# Patient Record
Sex: FEMALE | Race: WHITE | Employment: UNEMPLOYED | URBAN - METROPOLITAN AREA
[De-identification: names, ages, dates, MRNs, and addresses within clinical notes are randomized per-mention and may not be internally consistent; named-entity substitution may affect disease eponyms.]

---

## 2017-01-12 ENCOUNTER — NURSE ONLY (OUTPATIENT)
Dept: FAMILY MEDICINE CLINIC | Age: 57
End: 2017-01-12

## 2017-01-12 DIAGNOSIS — Z12.9 CANCER SCREENING: ICD-10-CM

## 2017-01-12 LAB
CONTROL: NORMAL
HEMOCCULT STL QL: NORMAL

## 2017-01-12 PROCEDURE — 82274 ASSAY TEST FOR BLOOD FECAL: CPT | Performed by: FAMILY MEDICINE

## 2017-01-13 DIAGNOSIS — N28.89 RIGHT RENAL MASS: ICD-10-CM

## 2017-01-13 RX ORDER — OXYCODONE HYDROCHLORIDE 15 MG/1
15 TABLET ORAL EVERY 6 HOURS PRN
Qty: 50 TABLET | Refills: 0 | Status: SHIPPED | OUTPATIENT
Start: 2017-01-13 | End: 2017-01-24 | Stop reason: SDUPTHER

## 2017-01-24 DIAGNOSIS — N28.89 RIGHT RENAL MASS: ICD-10-CM

## 2017-01-24 RX ORDER — OXYCODONE HYDROCHLORIDE 15 MG/1
15 TABLET ORAL EVERY 6 HOURS PRN
Qty: 50 TABLET | Refills: 0 | Status: SHIPPED | OUTPATIENT
Start: 2017-01-24 | End: 2017-02-07 | Stop reason: SDUPTHER

## 2017-01-30 ENCOUNTER — OFFICE VISIT (OUTPATIENT)
Dept: FAMILY MEDICINE CLINIC | Age: 57
End: 2017-01-30

## 2017-01-30 VITALS
WEIGHT: 293 LBS | BODY MASS INDEX: 53.92 KG/M2 | SYSTOLIC BLOOD PRESSURE: 140 MMHG | HEIGHT: 62 IN | DIASTOLIC BLOOD PRESSURE: 78 MMHG | HEART RATE: 70 BPM | OXYGEN SATURATION: 97 %

## 2017-01-30 DIAGNOSIS — M54.16 RIGHT LUMBAR RADICULOPATHY: ICD-10-CM

## 2017-01-30 DIAGNOSIS — M51.26 LUMBAR HERNIATED DISC: ICD-10-CM

## 2017-01-30 DIAGNOSIS — G89.4 CHRONIC PAIN SYNDROME: Primary | ICD-10-CM

## 2017-01-30 DIAGNOSIS — E11.65 UNCONTROLLED TYPE 2 DIABETES MELLITUS WITH HYPERGLYCEMIA, WITHOUT LONG-TERM CURRENT USE OF INSULIN (HCC): ICD-10-CM

## 2017-01-30 PROCEDURE — 36415 COLL VENOUS BLD VENIPUNCTURE: CPT | Performed by: FAMILY MEDICINE

## 2017-01-30 PROCEDURE — 99213 OFFICE O/P EST LOW 20 MIN: CPT | Performed by: FAMILY MEDICINE

## 2017-01-30 ASSESSMENT — ENCOUNTER SYMPTOMS
BACK PAIN: 1
RESPIRATORY NEGATIVE: 1

## 2017-01-31 LAB
ANION GAP SERPL CALCULATED.3IONS-SCNC: 14 MMOL/L (ref 3–16)
BUN BLDV-MCNC: 28 MG/DL (ref 7–20)
CALCIUM SERPL-MCNC: 9.3 MG/DL (ref 8.3–10.6)
CHLORIDE BLD-SCNC: 95 MMOL/L (ref 99–110)
CO2: 30 MMOL/L (ref 21–32)
CREAT SERPL-MCNC: 1.6 MG/DL (ref 0.6–1.1)
ESTIMATED AVERAGE GLUCOSE: 205.9 MG/DL
GFR AFRICAN AMERICAN: 40
GFR NON-AFRICAN AMERICAN: 33
GLUCOSE BLD-MCNC: 286 MG/DL (ref 70–99)
HBA1C MFR BLD: 8.8 %
POTASSIUM SERPL-SCNC: 3.8 MMOL/L (ref 3.5–5.1)
SODIUM BLD-SCNC: 139 MMOL/L (ref 136–145)

## 2017-01-31 RX ORDER — GLIPIZIDE 5 MG/1
TABLET ORAL
Qty: 90 TABLET | Refills: 0 | Status: SHIPPED | OUTPATIENT
Start: 2017-01-31 | End: 2017-05-05 | Stop reason: SDUPTHER

## 2017-02-07 DIAGNOSIS — N28.89 RIGHT RENAL MASS: ICD-10-CM

## 2017-02-07 RX ORDER — OXYCODONE HYDROCHLORIDE 15 MG/1
15 TABLET ORAL EVERY 6 HOURS PRN
Qty: 50 TABLET | Refills: 0 | Status: SHIPPED | OUTPATIENT
Start: 2017-02-07 | End: 2017-02-20 | Stop reason: SDUPTHER

## 2017-02-20 DIAGNOSIS — N28.89 RIGHT RENAL MASS: ICD-10-CM

## 2017-02-20 RX ORDER — OXYCODONE HYDROCHLORIDE 15 MG/1
15 TABLET ORAL EVERY 6 HOURS PRN
Qty: 100 TABLET | Refills: 0 | Status: SHIPPED | OUTPATIENT
Start: 2017-02-20 | End: 2017-03-16 | Stop reason: SDUPTHER

## 2017-03-16 DIAGNOSIS — N28.89 RIGHT RENAL MASS: ICD-10-CM

## 2017-03-17 RX ORDER — OXYCODONE HYDROCHLORIDE 15 MG/1
15 TABLET ORAL EVERY 6 HOURS PRN
Qty: 100 TABLET | Refills: 0 | Status: SHIPPED | OUTPATIENT
Start: 2017-03-17 | End: 2017-04-07 | Stop reason: SDUPTHER

## 2017-04-07 DIAGNOSIS — N28.89 RIGHT RENAL MASS: ICD-10-CM

## 2017-04-07 RX ORDER — OXYCODONE HYDROCHLORIDE 15 MG/1
15 TABLET ORAL EVERY 6 HOURS PRN
Qty: 100 TABLET | Refills: 0 | Status: SHIPPED | OUTPATIENT
Start: 2017-04-07 | End: 2017-05-01 | Stop reason: SDUPTHER

## 2017-05-01 DIAGNOSIS — N28.89 RIGHT RENAL MASS: ICD-10-CM

## 2017-05-01 RX ORDER — OXYCODONE HYDROCHLORIDE 15 MG/1
15 TABLET ORAL EVERY 6 HOURS PRN
Qty: 100 TABLET | Refills: 0 | Status: SHIPPED | OUTPATIENT
Start: 2017-05-01 | End: 2017-05-23 | Stop reason: SDUPTHER

## 2017-05-05 ENCOUNTER — OFFICE VISIT (OUTPATIENT)
Dept: FAMILY MEDICINE CLINIC | Age: 57
End: 2017-05-05

## 2017-05-05 VITALS
OXYGEN SATURATION: 98 % | HEIGHT: 62 IN | BODY MASS INDEX: 53.92 KG/M2 | SYSTOLIC BLOOD PRESSURE: 135 MMHG | WEIGHT: 293 LBS | DIASTOLIC BLOOD PRESSURE: 82 MMHG | HEART RATE: 75 BPM

## 2017-05-05 DIAGNOSIS — I10 ESSENTIAL HYPERTENSION: ICD-10-CM

## 2017-05-05 DIAGNOSIS — N39.0 URINARY TRACT INFECTION WITHOUT HEMATURIA, SITE UNSPECIFIED: ICD-10-CM

## 2017-05-05 DIAGNOSIS — M51.26 LUMBAR HERNIATED DISC: ICD-10-CM

## 2017-05-05 DIAGNOSIS — E11.65 UNCONTROLLED TYPE 2 DIABETES MELLITUS WITH HYPERGLYCEMIA, WITHOUT LONG-TERM CURRENT USE OF INSULIN (HCC): ICD-10-CM

## 2017-05-05 DIAGNOSIS — G89.4 CHRONIC PAIN SYNDROME: Primary | ICD-10-CM

## 2017-05-05 LAB
BILIRUBIN, POC: NORMAL
BLOOD URINE, POC: NORMAL
CLARITY, POC: NORMAL
COLOR, POC: YELLOW
GLUCOSE URINE, POC: NORMAL
HBA1C MFR BLD: 7.9 %
KETONES, POC: NORMAL
LEUKOCYTE EST, POC: NORMAL
NITRITE, POC: NORMAL
PH, POC: 5.5
PROTEIN, POC: 30
SPECIFIC GRAVITY, POC: 1.02
UROBILINOGEN, POC: 0.2

## 2017-05-05 PROCEDURE — 83036 HEMOGLOBIN GLYCOSYLATED A1C: CPT | Performed by: FAMILY MEDICINE

## 2017-05-05 PROCEDURE — 81002 URINALYSIS NONAUTO W/O SCOPE: CPT | Performed by: FAMILY MEDICINE

## 2017-05-05 PROCEDURE — 99214 OFFICE O/P EST MOD 30 MIN: CPT | Performed by: FAMILY MEDICINE

## 2017-05-05 RX ORDER — NITROFURANTOIN 25; 75 MG/1; MG/1
100 CAPSULE ORAL 2 TIMES DAILY
Qty: 20 CAPSULE | Refills: 0 | Status: SHIPPED | OUTPATIENT
Start: 2017-05-05 | End: 2017-05-15

## 2017-05-05 RX ORDER — GLIPIZIDE 5 MG/1
TABLET ORAL
Qty: 135 TABLET | Refills: 1 | Status: SHIPPED | OUTPATIENT
Start: 2017-05-05 | End: 2017-12-27 | Stop reason: SDUPTHER

## 2017-05-05 ASSESSMENT — ENCOUNTER SYMPTOMS
BACK PAIN: 1
GASTROINTESTINAL NEGATIVE: 1
RESPIRATORY NEGATIVE: 1

## 2017-05-07 LAB — URINE CULTURE, ROUTINE: NORMAL

## 2017-05-10 LAB
6-ACETYLMORPHINE: NOT DETECTED
7-AMINOCLONAZEPAM: NOT DETECTED
ALPHA-OH-ALPRAZOLAM: NOT DETECTED
ALPRAZOLAM: NOT DETECTED
AMPHETAMINE: NOT DETECTED
BARBITURATES: NOT DETECTED
BENZOYLECGONINE: NOT DETECTED
BUPRENORPHINE: NOT DETECTED
CARISOPRODOL: NOT DETECTED
CLONAZEPAM: NOT DETECTED
CODEINE: NOT DETECTED
CREATININE URINE: 97.1 MG/DL (ref 20–400)
DIAZEPAM: NOT DETECTED
DRUGS EXPECTED: NORMAL
EER PAIN MGT DRUG PANEL, HIGH RES/EMIT U: NORMAL
ETHYL GLUCURONIDE: NOT DETECTED
FENTANYL: NOT DETECTED
HYDROCODONE: NOT DETECTED
HYDROMORPHONE: NOT DETECTED
LORAZEPAM: NOT DETECTED
MARIJUANA METABOLITE: NOT DETECTED
MDA: NOT DETECTED
MDEA: NOT DETECTED
MDMA URINE: NOT DETECTED
MEPERIDINE: NOT DETECTED
METHADONE: NOT DETECTED
METHAMPHETAMINE: NOT DETECTED
METHYLPHENIDATE: NOT DETECTED
MIDAZOLAM: NOT DETECTED
MORPHINE: NOT DETECTED
NORBUPRENORPHINE, FREE: NOT DETECTED
NORDIAZEPAM: NOT DETECTED
NORFENTANYL: NOT DETECTED
NORHYDROCODONE, URINE: NOT DETECTED
NOROXYCODONE: PRESENT
NOROXYMORPHONE, URINE: PRESENT
OXAZEPAM: NOT DETECTED
OXYCODONE: PRESENT
OXYMORPHONE: PRESENT
PAIN MANAGEMENT DRUG PANEL: NORMAL
PAIN MANAGEMENT DRUG PANEL: NORMAL
PCP: NOT DETECTED
PHENTERMINE: NOT DETECTED
PROPOXYPHENE: NOT DETECTED
TAPENTADOL, URINE: NOT DETECTED
TAPENTADOL-O-SULFATE, URINE: NOT DETECTED
TEMAZEPAM: NOT DETECTED
TRAMADOL: NOT DETECTED
ZOLPIDEM: NOT DETECTED

## 2017-05-23 DIAGNOSIS — N28.89 RIGHT RENAL MASS: ICD-10-CM

## 2017-05-25 RX ORDER — OXYCODONE HYDROCHLORIDE 15 MG/1
15 TABLET ORAL EVERY 6 HOURS PRN
Qty: 100 TABLET | Refills: 0 | Status: SHIPPED | OUTPATIENT
Start: 2017-05-25 | End: 2017-06-19 | Stop reason: SDUPTHER

## 2017-06-14 DIAGNOSIS — N28.89 RIGHT RENAL MASS: ICD-10-CM

## 2017-06-15 RX ORDER — OXYCODONE HYDROCHLORIDE 15 MG/1
15 TABLET ORAL EVERY 6 HOURS PRN
Qty: 100 TABLET | Refills: 0 | OUTPATIENT
Start: 2017-06-15

## 2017-06-19 DIAGNOSIS — N28.89 RIGHT RENAL MASS: ICD-10-CM

## 2017-06-19 RX ORDER — OXYCODONE HYDROCHLORIDE 15 MG/1
15 TABLET ORAL EVERY 6 HOURS PRN
Qty: 100 TABLET | Refills: 0 | Status: SHIPPED | OUTPATIENT
Start: 2017-06-19 | End: 2017-07-12 | Stop reason: SDUPTHER

## 2017-07-10 DIAGNOSIS — N28.89 RIGHT RENAL MASS: ICD-10-CM

## 2017-07-10 RX ORDER — OXYCODONE HYDROCHLORIDE 15 MG/1
15 TABLET ORAL EVERY 6 HOURS PRN
Qty: 100 TABLET | Refills: 0 | OUTPATIENT
Start: 2017-07-10

## 2017-07-12 ENCOUNTER — HOSPITAL ENCOUNTER (OUTPATIENT)
Dept: SURGERY | Age: 57
Discharge: HOME | End: 2017-07-12
Attending: OPHTHALMOLOGY | Admitting: OPHTHALMOLOGY

## 2017-07-12 VITALS — DIASTOLIC BLOOD PRESSURE: 101 MMHG | HEART RATE: 106 BPM | SYSTOLIC BLOOD PRESSURE: 137 MMHG

## 2017-07-12 DIAGNOSIS — N28.89 RIGHT RENAL MASS: ICD-10-CM

## 2017-07-12 RX ORDER — PREDNISOLONE ACETATE 10 MG/ML
1 SUSPENSION/ DROPS OPHTHALMIC
Status: COMPLETED | OUTPATIENT
Start: 2017-07-12 | End: 2017-07-12

## 2017-07-12 RX ORDER — PROPARACAINE HYDROCHLORIDE 5 MG/ML
1 SOLUTION/ DROPS OPHTHALMIC
Status: COMPLETED | OUTPATIENT
Start: 2017-07-12 | End: 2017-07-12

## 2017-07-12 RX ORDER — PILOCARPINE HYDROCHLORIDE 20 MG/ML
1 SOLUTION/ DROPS OPHTHALMIC ONCE
Status: COMPLETED | OUTPATIENT
Start: 2017-07-12 | End: 2017-07-12

## 2017-07-12 RX ADMIN — PILOCARPINE HYDROCHLORIDE 1 DROP: 20 SOLUTION/ DROPS OPHTHALMIC at 15:09

## 2017-07-12 RX ADMIN — PROPARACAINE HYDROCHLORIDE 1 DROP: 5 SOLUTION/ DROPS OPHTHALMIC at 15:49

## 2017-07-12 RX ADMIN — PREDNISOLONE ACETATE 1 DROP: 10 SUSPENSION/ DROPS OPHTHALMIC at 15:51

## 2017-07-13 RX ORDER — OXYCODONE HYDROCHLORIDE 15 MG/1
15 TABLET ORAL EVERY 6 HOURS PRN
Qty: 100 TABLET | Refills: 0 | Status: SHIPPED | OUTPATIENT
Start: 2017-07-13 | End: 2017-08-07 | Stop reason: SDUPTHER

## 2017-07-13 RX ORDER — OXYCODONE HYDROCHLORIDE 15 MG/1
15 TABLET ORAL EVERY 6 HOURS PRN
Qty: 100 TABLET | Refills: 0 | OUTPATIENT
Start: 2017-07-13

## 2017-07-19 ENCOUNTER — HOSPITAL ENCOUNTER (OUTPATIENT)
Dept: SURGERY | Age: 57
Discharge: HOME | End: 2017-07-19
Attending: OPHTHALMOLOGY | Admitting: OPHTHALMOLOGY

## 2017-07-19 VITALS — DIASTOLIC BLOOD PRESSURE: 82 MMHG | SYSTOLIC BLOOD PRESSURE: 138 MMHG | HEART RATE: 80 BPM

## 2017-07-19 RX ORDER — PILOCARPINE HYDROCHLORIDE 20 MG/ML
1 SOLUTION/ DROPS OPHTHALMIC
Status: COMPLETED | OUTPATIENT
Start: 2017-07-19 | End: 2017-07-19

## 2017-07-19 RX ORDER — PREDNISOLONE ACETATE 10 MG/ML
1 SUSPENSION/ DROPS OPHTHALMIC
Status: COMPLETED | OUTPATIENT
Start: 2017-07-19 | End: 2017-07-19

## 2017-07-19 RX ORDER — PROPARACAINE HYDROCHLORIDE 5 MG/ML
1 SOLUTION/ DROPS OPHTHALMIC
Status: COMPLETED | OUTPATIENT
Start: 2017-07-19 | End: 2017-07-19

## 2017-07-19 RX ADMIN — PILOCARPINE HYDROCHLORIDE 1 DROP: 20 SOLUTION/ DROPS OPHTHALMIC at 13:45

## 2017-07-19 RX ADMIN — PROPARACAINE HYDROCHLORIDE 1 DROP: 5 SOLUTION/ DROPS OPHTHALMIC at 14:24

## 2017-07-19 RX ADMIN — PREDNISOLONE ACETATE 1 DROP: 10 SUSPENSION/ DROPS OPHTHALMIC at 14:28

## 2017-08-01 RX ORDER — PREGABALIN 150 MG/1
150 CAPSULE ORAL DAILY
Qty: 90 CAPSULE | Refills: 1 | Status: SHIPPED | OUTPATIENT
Start: 2017-08-01 | End: 2017-12-08 | Stop reason: SDUPTHER

## 2017-08-03 DIAGNOSIS — N28.89 RIGHT RENAL MASS: ICD-10-CM

## 2017-08-03 RX ORDER — OXYCODONE HYDROCHLORIDE 15 MG/1
15 TABLET ORAL EVERY 6 HOURS PRN
Qty: 100 TABLET | Refills: 0 | OUTPATIENT
Start: 2017-08-03

## 2017-08-07 DIAGNOSIS — N28.89 RIGHT RENAL MASS: ICD-10-CM

## 2017-08-07 RX ORDER — OXYCODONE HYDROCHLORIDE 15 MG/1
15 TABLET ORAL EVERY 6 HOURS PRN
Qty: 100 TABLET | Refills: 0 | Status: SHIPPED | OUTPATIENT
Start: 2017-08-07 | End: 2017-08-31 | Stop reason: SDUPTHER

## 2017-08-31 DIAGNOSIS — N28.89 RIGHT RENAL MASS: ICD-10-CM

## 2017-09-01 RX ORDER — OXYCODONE HYDROCHLORIDE 15 MG/1
15 TABLET ORAL EVERY 6 HOURS PRN
Qty: 100 TABLET | Refills: 0 | Status: SHIPPED | OUTPATIENT
Start: 2017-09-01 | End: 2017-09-26 | Stop reason: SDUPTHER

## 2017-09-08 ENCOUNTER — OFFICE VISIT (OUTPATIENT)
Dept: FAMILY MEDICINE CLINIC | Age: 57
End: 2017-09-08

## 2017-09-08 VITALS
DIASTOLIC BLOOD PRESSURE: 84 MMHG | RESPIRATION RATE: 17 BRPM | TEMPERATURE: 98.2 F | WEIGHT: 291 LBS | OXYGEN SATURATION: 94 % | BODY MASS INDEX: 53.22 KG/M2 | SYSTOLIC BLOOD PRESSURE: 150 MMHG | HEART RATE: 90 BPM

## 2017-09-08 DIAGNOSIS — G89.29 RIGHT FLANK PAIN, CHRONIC: ICD-10-CM

## 2017-09-08 DIAGNOSIS — E11.65 UNCONTROLLED TYPE 2 DIABETES MELLITUS WITH HYPERGLYCEMIA, WITHOUT LONG-TERM CURRENT USE OF INSULIN (HCC): ICD-10-CM

## 2017-09-08 DIAGNOSIS — R10.9 RIGHT FLANK PAIN, CHRONIC: ICD-10-CM

## 2017-09-08 DIAGNOSIS — G89.4 CHRONIC PAIN SYNDROME: Primary | ICD-10-CM

## 2017-09-08 DIAGNOSIS — I10 ESSENTIAL HYPERTENSION: ICD-10-CM

## 2017-09-08 LAB — HBA1C MFR BLD: 8 %

## 2017-09-08 PROCEDURE — 99214 OFFICE O/P EST MOD 30 MIN: CPT | Performed by: FAMILY MEDICINE

## 2017-09-08 PROCEDURE — 83036 HEMOGLOBIN GLYCOSYLATED A1C: CPT | Performed by: FAMILY MEDICINE

## 2017-09-08 ASSESSMENT — ENCOUNTER SYMPTOMS
RESPIRATORY NEGATIVE: 1
GASTROINTESTINAL NEGATIVE: 1
BACK PAIN: 1

## 2017-09-20 ENCOUNTER — INITIAL CONSULT (OUTPATIENT)
Dept: SURGERY | Age: 57
End: 2017-09-20

## 2017-09-20 VITALS
BODY MASS INDEX: 53.92 KG/M2 | SYSTOLIC BLOOD PRESSURE: 134 MMHG | WEIGHT: 293 LBS | HEIGHT: 62 IN | DIASTOLIC BLOOD PRESSURE: 78 MMHG

## 2017-09-20 DIAGNOSIS — R19.00 FLANK MASS: Primary | ICD-10-CM

## 2017-09-20 PROCEDURE — 99242 OFF/OP CONSLTJ NEW/EST SF 20: CPT | Performed by: SURGERY

## 2017-09-26 DIAGNOSIS — N28.89 RIGHT RENAL MASS: ICD-10-CM

## 2017-09-26 RX ORDER — OXYCODONE HYDROCHLORIDE 15 MG/1
15 TABLET ORAL EVERY 6 HOURS PRN
Qty: 100 TABLET | Refills: 0 | Status: SHIPPED | OUTPATIENT
Start: 2017-09-26 | End: 2017-10-20 | Stop reason: SDUPTHER

## 2017-10-20 DIAGNOSIS — N28.89 RIGHT RENAL MASS: ICD-10-CM

## 2017-10-20 RX ORDER — OXYCODONE HYDROCHLORIDE 15 MG/1
15 TABLET ORAL EVERY 6 HOURS PRN
Qty: 100 TABLET | Refills: 0 | Status: SHIPPED | OUTPATIENT
Start: 2017-10-20 | End: 2017-11-13 | Stop reason: SDUPTHER

## 2017-10-20 NOTE — TELEPHONE ENCOUNTER
Last Seen: Visit date not found    Last Writen: 9/26/17    Last UDS: 5/15/17    OARRS Run On: 5/5/16    Med Agreement Signed On: NA    Next Appointment: Visit date not found    Requested Prescriptions     Pending Prescriptions Disp Refills    oxyCODONE (OXY-IR) 15 MG immediate release tablet 100 tablet 0     Sig: Take 1 tablet by mouth every 6 hours as needed for Pain .  Earliest Fill Date: 10/20/17

## 2017-11-13 DIAGNOSIS — N28.89 RIGHT RENAL MASS: ICD-10-CM

## 2017-11-13 RX ORDER — OXYCODONE HYDROCHLORIDE 15 MG/1
15 TABLET ORAL EVERY 6 HOURS PRN
Qty: 100 TABLET | Refills: 0 | Status: SHIPPED | OUTPATIENT
Start: 2017-11-13 | End: 2017-12-08 | Stop reason: SDUPTHER

## 2017-11-13 NOTE — TELEPHONE ENCOUNTER
From: Concha Oconnor  Sent: 11/12/2017 11:05 AM EST  Subject: Medication Renewal Request    Concha Oconnor would like a refill of the following medications:  oxyCODONE (OXY-IR) 15 MG immediate release tablet [Pramod Delgado DO]    Preferred pharmacy: [address] [phone] - F [phone]    Comment:

## 2017-12-07 DIAGNOSIS — N28.89 RIGHT RENAL MASS: ICD-10-CM

## 2017-12-07 RX ORDER — OXYCODONE HYDROCHLORIDE 15 MG/1
15 TABLET ORAL EVERY 6 HOURS PRN
Qty: 100 TABLET | Refills: 0 | Status: CANCELLED | OUTPATIENT
Start: 2017-12-07

## 2017-12-07 NOTE — TELEPHONE ENCOUNTER
From: Daysi Beach  Sent: 12/7/2017 6:27 AM EST  Subject: Medication Renewal Request    Daysi Beach would like a refill of the following medications:  oxyCODONE (OXY-IR) 15 MG immediate release tablet Ayanna Gunter DO]    Preferred pharmacy: [address] [phone] - F [phone]    Comment:

## 2017-12-07 NOTE — TELEPHONE ENCOUNTER
Last Seen: 9/8/2017    Last Writen: 11-13-17    Last UDS: 5-5-17    OARRS Run On: 7-10-17    Med Agreement Signed On: 7-16-12    Next Appointment: 12/8/2017    Requested Prescriptions     Pending Prescriptions Disp Refills    oxyCODONE (OXY-IR) 15 MG immediate release tablet 100 tablet 0     Sig: Take 1 tablet by mouth every 6 hours as needed for Pain .  Earliest Fill Date: 12/7/17

## 2017-12-08 ENCOUNTER — OFFICE VISIT (OUTPATIENT)
Dept: FAMILY MEDICINE CLINIC | Age: 57
End: 2017-12-08

## 2017-12-08 VITALS
WEIGHT: 293 LBS | HEART RATE: 79 BPM | DIASTOLIC BLOOD PRESSURE: 86 MMHG | HEIGHT: 61 IN | OXYGEN SATURATION: 97 % | SYSTOLIC BLOOD PRESSURE: 152 MMHG | BODY MASS INDEX: 55.32 KG/M2

## 2017-12-08 DIAGNOSIS — N28.89 RIGHT RENAL MASS: ICD-10-CM

## 2017-12-08 DIAGNOSIS — M54.16 RIGHT LUMBAR RADICULOPATHY: ICD-10-CM

## 2017-12-08 DIAGNOSIS — G89.4 CHRONIC PAIN SYNDROME: Primary | ICD-10-CM

## 2017-12-08 LAB
ANION GAP SERPL CALCULATED.3IONS-SCNC: 13 MMOL/L (ref 3–16)
BUN BLDV-MCNC: 21 MG/DL (ref 7–20)
CALCIUM SERPL-MCNC: 9.3 MG/DL (ref 8.3–10.6)
CHLORIDE BLD-SCNC: 102 MMOL/L (ref 99–110)
CO2: 26 MMOL/L (ref 21–32)
CREAT SERPL-MCNC: 1.4 MG/DL (ref 0.6–1.1)
GFR AFRICAN AMERICAN: 47
GFR NON-AFRICAN AMERICAN: 39
GLUCOSE BLD-MCNC: 193 MG/DL (ref 70–99)
HBA1C MFR BLD: 8.8 %
POTASSIUM SERPL-SCNC: 4.7 MMOL/L (ref 3.5–5.1)
SODIUM BLD-SCNC: 141 MMOL/L (ref 136–145)

## 2017-12-08 PROCEDURE — 3045F PR MOST RECENT HEMOGLOBIN A1C LEVEL 7.0-9.0%: CPT | Performed by: FAMILY MEDICINE

## 2017-12-08 PROCEDURE — 1036F TOBACCO NON-USER: CPT | Performed by: FAMILY MEDICINE

## 2017-12-08 PROCEDURE — G8484 FLU IMMUNIZE NO ADMIN: HCPCS | Performed by: FAMILY MEDICINE

## 2017-12-08 PROCEDURE — 99214 OFFICE O/P EST MOD 30 MIN: CPT | Performed by: FAMILY MEDICINE

## 2017-12-08 PROCEDURE — 3017F COLORECTAL CA SCREEN DOC REV: CPT | Performed by: FAMILY MEDICINE

## 2017-12-08 PROCEDURE — 3014F SCREEN MAMMO DOC REV: CPT | Performed by: FAMILY MEDICINE

## 2017-12-08 PROCEDURE — G8427 DOCREV CUR MEDS BY ELIG CLIN: HCPCS | Performed by: FAMILY MEDICINE

## 2017-12-08 PROCEDURE — G8417 CALC BMI ABV UP PARAM F/U: HCPCS | Performed by: FAMILY MEDICINE

## 2017-12-08 PROCEDURE — 83036 HEMOGLOBIN GLYCOSYLATED A1C: CPT | Performed by: FAMILY MEDICINE

## 2017-12-08 RX ORDER — PREGABALIN 150 MG/1
150 CAPSULE ORAL 2 TIMES DAILY
Qty: 60 CAPSULE | Refills: 1 | Status: SHIPPED | OUTPATIENT
Start: 2017-12-08 | End: 2018-02-16 | Stop reason: SDUPTHER

## 2017-12-08 RX ORDER — ONDANSETRON HYDROCHLORIDE 8 MG/1
8 TABLET, FILM COATED ORAL EVERY 8 HOURS PRN
Qty: 30 TABLET | Refills: 0 | Status: SHIPPED | OUTPATIENT
Start: 2017-12-08 | End: 2019-10-14 | Stop reason: SDUPTHER

## 2017-12-08 RX ORDER — CHLORTHALIDONE 25 MG/1
25 TABLET ORAL DAILY
Qty: 90 TABLET | Refills: 1 | Status: SHIPPED | OUTPATIENT
Start: 2017-12-08 | End: 2018-06-02 | Stop reason: SDUPTHER

## 2017-12-08 RX ORDER — OXYCODONE HYDROCHLORIDE 15 MG/1
15 TABLET ORAL EVERY 6 HOURS PRN
Qty: 100 TABLET | Refills: 0 | Status: SHIPPED | OUTPATIENT
Start: 2017-12-08 | End: 2018-01-04 | Stop reason: SDUPTHER

## 2017-12-08 ASSESSMENT — ENCOUNTER SYMPTOMS
RESPIRATORY NEGATIVE: 1
GASTROINTESTINAL NEGATIVE: 1
BACK PAIN: 1

## 2017-12-08 ASSESSMENT — PATIENT HEALTH QUESTIONNAIRE - PHQ9
2. FEELING DOWN, DEPRESSED OR HOPELESS: 0
1. LITTLE INTEREST OR PLEASURE IN DOING THINGS: 0
SUM OF ALL RESPONSES TO PHQ QUESTIONS 1-9: 0
SUM OF ALL RESPONSES TO PHQ9 QUESTIONS 1 & 2: 0

## 2017-12-08 NOTE — PROGRESS NOTES
Subjective:      Patient ID: Sylvia Charles is a 61 y.o. female. In for check on Chronic pain(OK with med-on Lyrica)--DM(FBS's < 120)    Prior to Visit Medications :  Medication ondansetron (ZOFRAN) 8 MG tablet, Sig Take 1 tablet by mouth every 8 hours as needed for Nausea or Vomiting, Taking? Yes, Authorizing Provider Ash Garber, DO    Medication JANUVIA 50 MG tablet, Sig TAKE ONE TABLET BY MOUTH DAILY, Taking? Yes, Authorizing Provider Ash Garber, DO    Medication oxyCODONE (OXY-IR) 15 MG immediate release tablet, Sig Take 1 tablet by mouth every 6 hours as needed for Pain ., Taking? Yes, Authorizing Provider Ash Garber, DO    Medication pregabalin (LYRICA) 150 MG capsule, Sig Take 1 capsule by mouth daily, Taking? Yes, Authorizing Provider Ash Garber, DO    Medication glipiZIDE (GLUCOTROL) 5 MG tablet, Sig 1.5 daily AM, Taking? Yes, Authorizing Provider Ash Garber, DO    Medication glucose blood VI test strips (BL TEST STRIP PACK) strip, Sig 1 each by In Vitro route daily Glucose test strips for One touch Ultra glucometer up 6-7 times a day, Taking? Yes, Authorizing Provider Cora Graham CNP    Medication pravastatin (PRAVACHOL) 20 MG tablet, Sig Take 1 tablet by mouth daily, Taking? Yes, Authorizing Provider Jg Tate NP    Medication aspirin 81 MG tablet, Sig Take 81 mg by mouth daily, Taking? Yes, Authorizing Provider Historical Provider, MD    Medication glucose blood VI test strips (ASCENSIA AUTODISC VI;ONE TOUCH ULTRA TEST VI) strip, Sig 1 each by Does not apply route 2 times daily One touch ultra meter. .0. FSBS 2 times daily. , Taking?  Yes, Authorizing Provider Cora Graham CNP    Medication Insulin Pen Needle 32G X 6 MM MISC, Sig For insulin pen 3 times a day with meals, Taking? , Authorizing Provider Jg Tate NP    Medication potassium citrate (UROCIT-K) 10 MEQ (1080 MG) SR tablet, Sig Take 10 mEq by mouth 3 times daily (with meals), Taking? , Authorizing Provider Historical Provider, MD    Medication chlorthalidone (HYGROTON) 25 MG tablet, Sig Take 25 mg by mouth daily, Taking? , Authorizing Provider Historical Provider, MD      Past Medical History:  July, 2015: JEAN (acute kidney injury) [facility])      Comment: Nephrologist- Dr Deepak Cornejo  No date: Chronic back pain  No date: COPD (chronic obstructive pulmonary disease) (*  No date: Hyperlipidemia  2009: Necrotizing fasciitis ([facility] [address]      Comment: left groin  9/7/2011: Pain medication agreement signed      Comment: pt is not on contract anymore. does not take                pain meds as of february 2015  No date: Renal calculi      Comment: Rt staghorn--non obstr on Lt-as of 10/1  No date: Renal calculus or stone  No date: Sleep apnea  No date: Type II or unspecified type diabetes mellitus *  10/21/15: VRE (vancomycin-resistant Enterococci)      Comment: urine          Review of Systems   HENT: Negative. Respiratory: Negative. Cardiovascular: Negative. Gastrointestinal: Negative. Musculoskeletal: Positive for arthralgias and back pain. Neurological: Negative. Objective:   Physical Exam   Constitutional: She is oriented to person, place, and time. Eyes: Conjunctivae are normal.   Cardiovascular: Normal rate, regular rhythm and normal heart sounds. Pulmonary/Chest: Effort normal and breath sounds normal.   Abdominal: Soft. There is no tenderness. Musculoskeletal:   Tender,mild spasm,reduced rom L spine   Neurological: She is alert and oriented to person, place, and time. Assessment:      1. Chronic pain syndrome    2. Right lumbar radiculopathy    3. Uncontrolled type 2 diabetes mellitus without complication, without long-term current use of insulin ([facility] [address]            Plan:      Sam Palacios was seen today for chronic pain and diabetes.     Diagnoses and all orders for this visit:    Chronic pain syndrome  Try & reduce pain meds-I will increase Lyrica to twice a day  Right

## 2018-01-04 DIAGNOSIS — N28.89 RIGHT RENAL MASS: ICD-10-CM

## 2018-01-04 RX ORDER — OXYCODONE HYDROCHLORIDE 15 MG/1
15 TABLET ORAL EVERY 8 HOURS PRN
Qty: 90 TABLET | Refills: 0 | Status: SHIPPED | OUTPATIENT
Start: 2018-01-04 | End: 2018-02-05 | Stop reason: SDUPTHER

## 2018-01-04 NOTE — TELEPHONE ENCOUNTER
Last Seen: 12/8/2017    Last Writen: 12-8-17   # 100  0 RF    Last UDS: 5-5-17    OARRS Run On: 12-8-17    Med Agreement Signed On: 7-16-12    Next Appointment: 3/9/2018    Requested Prescriptions     Pending Prescriptions Disp Refills    oxyCODONE (OXY-IR) 15 MG immediate release tablet 100 tablet 0     Sig: Take 1 tablet by mouth every 6 hours as needed for Pain.  Earliest Fill Date: 1/4/18

## 2018-03-06 DIAGNOSIS — N28.89 RIGHT RENAL MASS: ICD-10-CM

## 2018-03-06 RX ORDER — OXYCODONE HYDROCHLORIDE 15 MG/1
15 TABLET ORAL EVERY 8 HOURS PRN
Qty: 90 TABLET | Refills: 0 | Status: SHIPPED | OUTPATIENT
Start: 2018-03-06 | End: 2018-04-05 | Stop reason: SDUPTHER

## 2018-03-06 NOTE — TELEPHONE ENCOUNTER
.  Last Seen: 2017    Last Ricco Angjoann18 #90 with 0 refill     Last UDS: 17    OARRS Run On: 17    Med Agreement Signed On: 12    Next Appointment: 3/9/2018    Requested Prescriptions     Pending Prescriptions Disp Refills    oxyCODONE (OXY-IR) 15 MG immediate release tablet 90 tablet 0     Sig: Take 1 tablet by mouth every 8 hours as needed for Pain for up to 30 days.  Earliest Fill Date: 3/6/18

## 2018-03-09 ENCOUNTER — OFFICE VISIT (OUTPATIENT)
Dept: FAMILY MEDICINE CLINIC | Age: 58
End: 2018-03-09

## 2018-03-09 VITALS
WEIGHT: 293 LBS | HEIGHT: 61 IN | BODY MASS INDEX: 55.32 KG/M2 | DIASTOLIC BLOOD PRESSURE: 78 MMHG | HEART RATE: 79 BPM | SYSTOLIC BLOOD PRESSURE: 140 MMHG | OXYGEN SATURATION: 98 %

## 2018-03-09 DIAGNOSIS — G89.4 CHRONIC PAIN SYNDROME: Primary | ICD-10-CM

## 2018-03-09 DIAGNOSIS — M54.16 RIGHT LUMBAR RADICULOPATHY: ICD-10-CM

## 2018-03-09 LAB
ANION GAP SERPL CALCULATED.3IONS-SCNC: 19 MMOL/L (ref 3–16)
BUN BLDV-MCNC: 37 MG/DL (ref 7–20)
CALCIUM SERPL-MCNC: 9.2 MG/DL (ref 8.3–10.6)
CHLORIDE BLD-SCNC: 101 MMOL/L (ref 99–110)
CO2: 25 MMOL/L (ref 21–32)
CREAT SERPL-MCNC: 1.5 MG/DL (ref 0.6–1.1)
GFR AFRICAN AMERICAN: 43
GFR NON-AFRICAN AMERICAN: 36
GLUCOSE BLD-MCNC: 214 MG/DL (ref 70–99)
HBA1C MFR BLD: 8.3 %
POTASSIUM SERPL-SCNC: 4.2 MMOL/L (ref 3.5–5.1)
SODIUM BLD-SCNC: 145 MMOL/L (ref 136–145)

## 2018-03-09 PROCEDURE — 83036 HEMOGLOBIN GLYCOSYLATED A1C: CPT | Performed by: FAMILY MEDICINE

## 2018-03-09 PROCEDURE — 99214 OFFICE O/P EST MOD 30 MIN: CPT | Performed by: FAMILY MEDICINE

## 2018-03-09 PROCEDURE — 3014F SCREEN MAMMO DOC REV: CPT | Performed by: FAMILY MEDICINE

## 2018-03-09 PROCEDURE — 3017F COLORECTAL CA SCREEN DOC REV: CPT | Performed by: FAMILY MEDICINE

## 2018-03-09 PROCEDURE — G8427 DOCREV CUR MEDS BY ELIG CLIN: HCPCS | Performed by: FAMILY MEDICINE

## 2018-03-09 PROCEDURE — G8484 FLU IMMUNIZE NO ADMIN: HCPCS | Performed by: FAMILY MEDICINE

## 2018-03-09 PROCEDURE — 1036F TOBACCO NON-USER: CPT | Performed by: FAMILY MEDICINE

## 2018-03-09 PROCEDURE — 3045F PR MOST RECENT HEMOGLOBIN A1C LEVEL 7.0-9.0%: CPT | Performed by: FAMILY MEDICINE

## 2018-03-09 PROCEDURE — G8417 CALC BMI ABV UP PARAM F/U: HCPCS | Performed by: FAMILY MEDICINE

## 2018-03-09 ASSESSMENT — ENCOUNTER SYMPTOMS
GASTROINTESTINAL NEGATIVE: 1
BACK PAIN: 1
RESPIRATORY NEGATIVE: 1

## 2018-03-09 NOTE — PROGRESS NOTES
Subjective:      Patient ID: Geovani Mcallister is a 61 y.o. female. In for check on Chronic Pain(stable with meds-doing OK with 3 meds per day)--DM(labile at home)    Prior to Visit Medications :  Medication oxyCODONE (OXY-IR) 15 MG immediate release tablet, Sig Take 1 tablet by mouth every 8 hours as needed for Pain for up to 30 days. , Taking? Yes, Authorizing Provider Ash Garber, DO    Medication LYRICA 150 MG capsule, Sig TAKE ONE CAPSULE BY MOUTH TWICE A DAY, Taking? Yes, Authorizing Provider Ash Garber, DO    Medication JANUVIA 50 MG tablet, Sig TAKE ONE TABLET BY MOUTH DAILY, Taking? Yes, Authorizing Provider Ash Garber, DO    Medication glipiZIDE (GLUCOTROL) 5 MG tablet, Sig TAKE ONE AND ONE-HALF TABLET BY MOUTH IN THE MORNING, Taking? Yes, Authorizing Provider Ash Garber, DO    Medication ondansetron (ZOFRAN) 8 MG tablet, Sig Take 1 tablet by mouth every 8 hours as needed for Nausea or Vomiting, Taking? Yes, Authorizing Provider Ash Garber, DO    Medication chlorthalidone (HYGROTON) 25 MG tablet, Sig Take 1 tablet by mouth daily, Taking? Yes, Authorizing Provider Ash Garber, DO    Medication glucose blood VI test strips (BL TEST STRIP PACK) strip, Sig 1 each by In Vitro route daily Glucose test strips for One touch Ultra glucometer up 6-7 times a day, Taking? Yes, Authorizing Provider Shanti Garza CNP    Medication pravastatin (PRAVACHOL) 20 MG tablet, Sig Take 1 tablet by mouth daily, Taking? Yes, Authorizing Provider Keven Knight, ALEX    Medication aspirin 81 MG tablet, Sig Take 81 mg by mouth daily, Taking? Yes, Authorizing Provider Historical Provider, MD    Medication glucose blood VI test strips (ASCENSIA AUTODISC VI;ONE TOUCH ULTRA TEST VI) strip, Sig 1 each by Does not apply route 2 times daily One touch ultra meter. .0. FSBS 2 times daily. , Taking?  Yes, Authorizing Provider Shanti Garza CNP    Medication Insulin Pen Needle 32G X 6 MM MISC, Sig For insulin current use of insulin (HCC)  -     POCT glycosylated hemoglobin (Hb A1C)  AIC 8.3(8.8)-continue medslower carbs-increase Glip to 2 in AM  Right lumbar radiculopathy  As above    See me 3 mos

## 2018-04-05 DIAGNOSIS — N28.89 RIGHT RENAL MASS: ICD-10-CM

## 2018-04-05 RX ORDER — OXYCODONE HYDROCHLORIDE 15 MG/1
15 TABLET ORAL EVERY 8 HOURS PRN
Qty: 90 TABLET | Refills: 0 | Status: SHIPPED | OUTPATIENT
Start: 2018-04-05 | End: 2018-05-07 | Stop reason: SDUPTHER

## 2018-04-30 RX ORDER — SITAGLIPTIN 50 MG/1
TABLET, FILM COATED ORAL
Qty: 30 TABLET | Refills: 1 | Status: SHIPPED | OUTPATIENT
Start: 2018-04-30 | End: 2018-06-30 | Stop reason: SDUPTHER

## 2018-05-06 ENCOUNTER — PATIENT MESSAGE (OUTPATIENT)
Dept: FAMILY MEDICINE CLINIC | Age: 58
End: 2018-05-06

## 2018-05-06 DIAGNOSIS — N28.89 RIGHT RENAL MASS: ICD-10-CM

## 2018-05-07 RX ORDER — OXYCODONE HYDROCHLORIDE 15 MG/1
15 TABLET ORAL EVERY 8 HOURS PRN
Qty: 90 TABLET | Refills: 0 | Status: SHIPPED | OUTPATIENT
Start: 2018-05-07 | End: 2018-06-05 | Stop reason: SDUPTHER

## 2018-06-04 RX ORDER — CHLORTHALIDONE 25 MG/1
TABLET ORAL
Qty: 30 TABLET | Refills: 0 | Status: SHIPPED | OUTPATIENT
Start: 2018-06-04 | End: 2018-06-30 | Stop reason: SDUPTHER

## 2018-06-05 DIAGNOSIS — N28.89 RIGHT RENAL MASS: ICD-10-CM

## 2018-06-05 RX ORDER — OXYCODONE HYDROCHLORIDE 15 MG/1
15 TABLET ORAL EVERY 8 HOURS PRN
Qty: 90 TABLET | Refills: 0 | Status: SHIPPED | OUTPATIENT
Start: 2018-06-05 | End: 2018-07-05 | Stop reason: SDUPTHER

## 2018-06-15 ENCOUNTER — OFFICE VISIT (OUTPATIENT)
Dept: FAMILY MEDICINE CLINIC | Age: 58
End: 2018-06-15

## 2018-06-15 VITALS
BODY MASS INDEX: 55.36 KG/M2 | SYSTOLIC BLOOD PRESSURE: 150 MMHG | DIASTOLIC BLOOD PRESSURE: 80 MMHG | HEART RATE: 93 BPM | OXYGEN SATURATION: 98 % | WEIGHT: 293 LBS

## 2018-06-15 DIAGNOSIS — G89.4 CHRONIC PAIN SYNDROME: Primary | ICD-10-CM

## 2018-06-15 DIAGNOSIS — E66.01 MORBID OBESITY (HCC): ICD-10-CM

## 2018-06-15 DIAGNOSIS — M54.16 RIGHT LUMBAR RADICULOPATHY: ICD-10-CM

## 2018-06-15 DIAGNOSIS — I10 ESSENTIAL HYPERTENSION: ICD-10-CM

## 2018-06-15 LAB
CREATININE URINE POCT: 200
HBA1C MFR BLD: 9.9 %
MICROALBUMIN/CREAT 24H UR: 150 MG/G{CREAT}
MICROALBUMIN/CREAT UR-RTO: ABNORMAL

## 2018-06-15 PROCEDURE — 1036F TOBACCO NON-USER: CPT | Performed by: FAMILY MEDICINE

## 2018-06-15 PROCEDURE — 99214 OFFICE O/P EST MOD 30 MIN: CPT | Performed by: FAMILY MEDICINE

## 2018-06-15 PROCEDURE — 83036 HEMOGLOBIN GLYCOSYLATED A1C: CPT | Performed by: FAMILY MEDICINE

## 2018-06-15 PROCEDURE — 3046F HEMOGLOBIN A1C LEVEL >9.0%: CPT | Performed by: FAMILY MEDICINE

## 2018-06-15 PROCEDURE — G8427 DOCREV CUR MEDS BY ELIG CLIN: HCPCS | Performed by: FAMILY MEDICINE

## 2018-06-15 PROCEDURE — 3017F COLORECTAL CA SCREEN DOC REV: CPT | Performed by: FAMILY MEDICINE

## 2018-06-15 PROCEDURE — 2022F DILAT RTA XM EVC RTNOPTHY: CPT | Performed by: FAMILY MEDICINE

## 2018-06-15 PROCEDURE — 82044 UR ALBUMIN SEMIQUANTITATIVE: CPT | Performed by: FAMILY MEDICINE

## 2018-06-15 PROCEDURE — G8417 CALC BMI ABV UP PARAM F/U: HCPCS | Performed by: FAMILY MEDICINE

## 2018-06-15 ASSESSMENT — ENCOUNTER SYMPTOMS
SHORTNESS OF BREATH: 0
ABDOMINAL PAIN: 0
COUGH: 0
SINUS PRESSURE: 0
BACK PAIN: 1

## 2018-06-19 LAB
6-ACETYLMORPHINE: NOT DETECTED
7-AMINOCLONAZEPAM: NOT DETECTED
ALPHA-OH-ALPRAZOLAM: NOT DETECTED
ALPRAZOLAM: NOT DETECTED
AMPHETAMINE: NOT DETECTED
BARBITURATES: NOT DETECTED
BENZOYLECGONINE: NOT DETECTED
BUPRENORPHINE: NOT DETECTED
CARISOPRODOL: NOT DETECTED
CLONAZEPAM: NOT DETECTED
CODEINE: NOT DETECTED
CREATININE URINE: 99.6 MG/DL (ref 20–400)
DIAZEPAM: NOT DETECTED
DRUGS EXPECTED: NORMAL
EER PAIN MGT DRUG PANEL, HIGH RES/EMIT U: NORMAL
ETHYL GLUCURONIDE: NOT DETECTED
FENTANYL: NOT DETECTED
HYDROCODONE: NOT DETECTED
HYDROMORPHONE: NOT DETECTED
LORAZEPAM: NOT DETECTED
MARIJUANA METABOLITE: NOT DETECTED
MDA: NOT DETECTED
MDEA: NOT DETECTED
MDMA URINE: NOT DETECTED
MEPERIDINE: NOT DETECTED
METHADONE: NOT DETECTED
METHAMPHETAMINE: NOT DETECTED
METHYLPHENIDATE: NOT DETECTED
MIDAZOLAM: NOT DETECTED
MORPHINE: NOT DETECTED
NORBUPRENORPHINE, FREE: NOT DETECTED
NORDIAZEPAM: NOT DETECTED
NORFENTANYL: NOT DETECTED
NORHYDROCODONE, URINE: NOT DETECTED
NOROXYCODONE: PRESENT
NOROXYMORPHONE, URINE: PRESENT
OXAZEPAM: NOT DETECTED
OXYCODONE: PRESENT
OXYMORPHONE: PRESENT
PAIN MANAGEMENT DRUG PANEL: NORMAL
PAIN MANAGEMENT DRUG PANEL: NORMAL
PCP: NOT DETECTED
PHENTERMINE: NOT DETECTED
PROPOXYPHENE: NOT DETECTED
TAPENTADOL, URINE: NOT DETECTED
TAPENTADOL-O-SULFATE, URINE: NOT DETECTED
TEMAZEPAM: NOT DETECTED
TRAMADOL: NOT DETECTED
ZOLPIDEM: NOT DETECTED

## 2018-07-05 DIAGNOSIS — N28.89 RIGHT RENAL MASS: ICD-10-CM

## 2018-07-05 RX ORDER — OXYCODONE HYDROCHLORIDE 15 MG/1
15 TABLET ORAL EVERY 8 HOURS PRN
Qty: 90 TABLET | Refills: 0 | Status: SHIPPED | OUTPATIENT
Start: 2018-07-05 | End: 2018-08-06 | Stop reason: SDUPTHER

## 2018-07-05 NOTE — TELEPHONE ENCOUNTER
.  Last Seen: 6/15/2018    Last Philip Osier: 6-5-18 #90 with 0 refills     Last UDS: 5-5-17    OARRS Run On: 7-5-18    Med Agreement Signed On: 7-16-12    Next Appointment: 9/14/2018    Requested Prescriptions     Pending Prescriptions Disp Refills    oxyCODONE (OXY-IR) 15 MG immediate release tablet 90 tablet 0     Sig: Take 1 tablet by mouth every 8 hours as needed for Pain for up to 30 days. Bolivar Robertson Date: 7/5/18

## 2018-08-05 ENCOUNTER — PATIENT MESSAGE (OUTPATIENT)
Dept: FAMILY MEDICINE CLINIC | Age: 58
End: 2018-08-05

## 2018-08-05 DIAGNOSIS — N28.89 RIGHT RENAL MASS: ICD-10-CM

## 2018-08-06 RX ORDER — OXYCODONE HYDROCHLORIDE 15 MG/1
15 TABLET ORAL EVERY 8 HOURS PRN
Qty: 90 TABLET | Refills: 0 | Status: SHIPPED | OUTPATIENT
Start: 2018-08-06 | End: 2018-09-05 | Stop reason: SDUPTHER

## 2018-09-05 DIAGNOSIS — N28.89 RIGHT RENAL MASS: ICD-10-CM

## 2018-09-05 RX ORDER — OXYCODONE HYDROCHLORIDE 15 MG/1
15 TABLET ORAL EVERY 8 HOURS PRN
Qty: 90 TABLET | Refills: 0 | Status: SHIPPED | OUTPATIENT
Start: 2018-09-05 | End: 2018-10-05 | Stop reason: SDUPTHER

## 2018-09-14 ENCOUNTER — OFFICE VISIT (OUTPATIENT)
Dept: FAMILY MEDICINE CLINIC | Age: 58
End: 2018-09-14

## 2018-09-14 VITALS
HEART RATE: 93 BPM | DIASTOLIC BLOOD PRESSURE: 80 MMHG | SYSTOLIC BLOOD PRESSURE: 140 MMHG | WEIGHT: 290 LBS | HEIGHT: 62 IN | OXYGEN SATURATION: 93 % | BODY MASS INDEX: 53.37 KG/M2

## 2018-09-14 DIAGNOSIS — I10 ESSENTIAL HYPERTENSION: ICD-10-CM

## 2018-09-14 DIAGNOSIS — G89.4 CHRONIC PAIN SYNDROME: Primary | ICD-10-CM

## 2018-09-14 LAB — HBA1C MFR BLD: 7.3 %

## 2018-09-14 PROCEDURE — 3046F HEMOGLOBIN A1C LEVEL >9.0%: CPT | Performed by: FAMILY MEDICINE

## 2018-09-14 PROCEDURE — 2022F DILAT RTA XM EVC RTNOPTHY: CPT | Performed by: FAMILY MEDICINE

## 2018-09-14 PROCEDURE — G8427 DOCREV CUR MEDS BY ELIG CLIN: HCPCS | Performed by: FAMILY MEDICINE

## 2018-09-14 PROCEDURE — G8417 CALC BMI ABV UP PARAM F/U: HCPCS | Performed by: FAMILY MEDICINE

## 2018-09-14 PROCEDURE — 3017F COLORECTAL CA SCREEN DOC REV: CPT | Performed by: FAMILY MEDICINE

## 2018-09-14 PROCEDURE — 1036F TOBACCO NON-USER: CPT | Performed by: FAMILY MEDICINE

## 2018-09-14 PROCEDURE — 99214 OFFICE O/P EST MOD 30 MIN: CPT | Performed by: FAMILY MEDICINE

## 2018-09-14 PROCEDURE — 83036 HEMOGLOBIN GLYCOSYLATED A1C: CPT | Performed by: FAMILY MEDICINE

## 2018-09-14 ASSESSMENT — ENCOUNTER SYMPTOMS
BACK PAIN: 1
COUGH: 0
SHORTNESS OF BREATH: 0
BLOOD IN STOOL: 0
ABDOMINAL PAIN: 0

## 2018-09-14 ASSESSMENT — PATIENT HEALTH QUESTIONNAIRE - PHQ9
SUM OF ALL RESPONSES TO PHQ QUESTIONS 1-9: 0
2. FEELING DOWN, DEPRESSED OR HOPELESS: 0
SUM OF ALL RESPONSES TO PHQ9 QUESTIONS 1 & 2: 0
SUM OF ALL RESPONSES TO PHQ QUESTIONS 1-9: 0
1. LITTLE INTEREST OR PLEASURE IN DOING THINGS: 0

## 2018-09-14 NOTE — PATIENT INSTRUCTIONS
Uyen Maldonado was seen today for pain, diabetes and hypertension.     Diagnoses and all orders for this visit:    Chronic pain syndrome  meds as needed-heat ex's  Essential hypertension  -     Basic Metabolic Panel  Continue meds-RENETTA diet-rec:SS  Uncontrolled type 2 diabetes mellitus without complication, without long-term current use of insulin (HCC)  -     Basic Metabolic Panel  AIC 9.3-MCWTPRRP meds-increase Trulicity to 7.7-IZF me 3 mos  Other orders  -     Dulaglutide (TRULICITY) 1.5 VY/5.5LC SOPN; 1 inj per week

## 2018-09-14 NOTE — PROGRESS NOTES
Subjective:      Patient ID: Sylvie Dorman is a 61 y.o. female. In for check on Chr Pain(OK w meds-able to be fairly active)-DM(on Trulicity x 3 mos-FBS's < 120)--HT(OK at home)    Prior to Visit Medications :  Medication LYRICA 150 MG capsule, Sig TAKE ONE CAPSULE BY MOUTH TWICE A DAY, Taking? Yes, Authorizing Provider Ash Garber, DO    Medication oxyCODONE (OXY-IR) 15 MG immediate release tablet, Sig Take 1 tablet by mouth every 8 hours as needed for Pain for up to 30 days. ., Taking? Yes, Authorizing Provider Ash Garber, DO    Medication TRULICITY 9.05 OT/2.4US SOPN, Sig INJECT 0.75 MG UNDER THE SKIN ONCE WEEKLY, Taking? Yes, Authorizing Provider Ash Garber, DO    Medication glipiZIDE (GLUCOTROL) 5 MG tablet, Sig TAKE ONE AND ONE-HALF TABLET BY MOUTH EVERY MORNING, Taking? Yes, Authorizing Provider Ash Garber, DO    Medication JANUVIA 50 MG tablet, Sig TAKE ONE TABLET BY MOUTH DAILY, Taking? Yes, Authorizing Provider Ash Garber, DO    Medication chlorthalidone (HYGROTON) 25 MG tablet, Sig TAKE ONE TABLET BY MOUTH DAILY, Taking? Yes, Authorizing Provider Ash Garber, DO    Medication blood glucose test strips (ASCENSIA AUTODISC VI;ONE TOUCH ULTRA TEST VI) strip, Sig 1 each by Does not apply route 2 times daily One touch ultra meter. .0. FSBS 2 times daily. , Taking? Yes, Authorizing Provider Ash Garber, DO    Medication ondansetron (ZOFRAN) 8 MG tablet, Sig Take 1 tablet by mouth every 8 hours as needed for Nausea or Vomiting, Taking? Yes, Authorizing Provider Ash Garber, DO    Medication glucose blood VI test strips (BL TEST STRIP PACK) strip, Sig 1 each by In Vitro route daily Glucose test strips for One touch Ultra glucometer up 6-7 times a day, Taking? Yes, Authorizing Provider ANIYA Damon Asp - CNP    Medication pravastatin (PRAVACHOL) 20 MG tablet, Sig Take 1 tablet by mouth daily, Taking?  Yes, Authorizing Provider ANIYA Rojas - CNP    Medication aspirin

## 2018-10-05 DIAGNOSIS — N28.89 RIGHT RENAL MASS: ICD-10-CM

## 2018-10-05 RX ORDER — OXYCODONE HYDROCHLORIDE 15 MG/1
15 TABLET ORAL EVERY 8 HOURS PRN
Qty: 90 TABLET | Refills: 0 | Status: SHIPPED | OUTPATIENT
Start: 2018-10-05 | End: 2018-11-05 | Stop reason: SDUPTHER

## 2018-11-05 ENCOUNTER — PATIENT MESSAGE (OUTPATIENT)
Dept: FAMILY MEDICINE CLINIC | Age: 58
End: 2018-11-05

## 2018-11-05 DIAGNOSIS — N28.89 RIGHT RENAL MASS: ICD-10-CM

## 2018-11-05 RX ORDER — OXYCODONE HYDROCHLORIDE 15 MG/1
15 TABLET ORAL EVERY 8 HOURS PRN
Qty: 90 TABLET | Refills: 0 | Status: SHIPPED | OUTPATIENT
Start: 2018-11-05 | End: 2018-12-05 | Stop reason: SDUPTHER

## 2018-11-05 NOTE — TELEPHONE ENCOUNTER
From: Akash Mendiola  To: Breanna Paulson DO  Sent: 11/5/2018 7:16 AM EST  Subject: Prescription Question    Good morning,   I need too request a refill on my oxycodone and it's not on my refill page.       Thank you   [employer]

## 2018-12-05 DIAGNOSIS — N28.89 RIGHT RENAL MASS: ICD-10-CM

## 2018-12-05 RX ORDER — OXYCODONE HYDROCHLORIDE 15 MG/1
15 TABLET ORAL EVERY 8 HOURS PRN
Qty: 90 TABLET | Refills: 0 | Status: SHIPPED | OUTPATIENT
Start: 2018-12-05 | End: 2019-01-04 | Stop reason: SDUPTHER

## 2018-12-15 ENCOUNTER — HOSPITAL ENCOUNTER (OUTPATIENT)
Age: 58
Discharge: HOME | End: 2018-12-15
Payer: COMMERCIAL

## 2018-12-15 PROCEDURE — 36415 COLL VENOUS BLD VENIPUNCTURE: CPT

## 2018-12-15 PROCEDURE — 80048 BASIC METABOLIC PNL TOTAL CA: CPT

## 2018-12-16 LAB
ANION GAP SERPL CALCULATED.3IONS-SCNC: 10 MMOL/L (ref 3–16)
BUN BLDV-MCNC: 21 MG/DL (ref 7–20)
CALCIUM SERPL-MCNC: 9.2 MG/DL (ref 8.3–10.6)
CHLORIDE BLD-SCNC: 98 MMOL/L (ref 99–110)
CO2: 29 MMOL/L (ref 21–32)
CREAT SERPL-MCNC: 1.5 MG/DL (ref 0.6–1.1)
GFR AFRICAN AMERICAN: 43
GFR NON-AFRICAN AMERICAN: 36
GLUCOSE BLD-MCNC: 146 MG/DL (ref 70–99)
POTASSIUM SERPL-SCNC: 3.4 MMOL/L (ref 3.5–5.1)
SODIUM BLD-SCNC: 137 MMOL/L (ref 136–145)

## 2018-12-17 ENCOUNTER — OFFICE VISIT (OUTPATIENT)
Dept: FAMILY MEDICINE CLINIC | Age: 58
End: 2018-12-17
Payer: COMMERCIAL

## 2018-12-17 VITALS
WEIGHT: 291 LBS | DIASTOLIC BLOOD PRESSURE: 74 MMHG | HEART RATE: 91 BPM | SYSTOLIC BLOOD PRESSURE: 134 MMHG | BODY MASS INDEX: 53.55 KG/M2 | HEIGHT: 62 IN | OXYGEN SATURATION: 97 %

## 2018-12-17 DIAGNOSIS — Z12.11 COLON CANCER SCREENING: ICD-10-CM

## 2018-12-17 DIAGNOSIS — Z23 NEED FOR PROPHYLACTIC VACCINATION AND INOCULATION AGAINST VARICELLA: ICD-10-CM

## 2018-12-17 DIAGNOSIS — E11.65 UNCONTROLLED TYPE 2 DIABETES MELLITUS WITH HYPERGLYCEMIA (HCC): ICD-10-CM

## 2018-12-17 DIAGNOSIS — G89.4 CHRONIC PAIN SYNDROME: Primary | ICD-10-CM

## 2018-12-17 DIAGNOSIS — Z12.31 ENCOUNTER FOR SCREENING MAMMOGRAM FOR BREAST CANCER: ICD-10-CM

## 2018-12-17 LAB — HBA1C MFR BLD: 6.4 %

## 2018-12-17 PROCEDURE — 2022F DILAT RTA XM EVC RTNOPTHY: CPT | Performed by: FAMILY MEDICINE

## 2018-12-17 PROCEDURE — 1036F TOBACCO NON-USER: CPT | Performed by: FAMILY MEDICINE

## 2018-12-17 PROCEDURE — 99214 OFFICE O/P EST MOD 30 MIN: CPT | Performed by: FAMILY MEDICINE

## 2018-12-17 PROCEDURE — 3044F HG A1C LEVEL LT 7.0%: CPT | Performed by: FAMILY MEDICINE

## 2018-12-17 PROCEDURE — 3017F COLORECTAL CA SCREEN DOC REV: CPT | Performed by: FAMILY MEDICINE

## 2018-12-17 PROCEDURE — G8484 FLU IMMUNIZE NO ADMIN: HCPCS | Performed by: FAMILY MEDICINE

## 2018-12-17 PROCEDURE — G8417 CALC BMI ABV UP PARAM F/U: HCPCS | Performed by: FAMILY MEDICINE

## 2018-12-17 PROCEDURE — G8427 DOCREV CUR MEDS BY ELIG CLIN: HCPCS | Performed by: FAMILY MEDICINE

## 2018-12-17 PROCEDURE — 83036 HEMOGLOBIN GLYCOSYLATED A1C: CPT | Performed by: FAMILY MEDICINE

## 2018-12-17 ASSESSMENT — ENCOUNTER SYMPTOMS
COUGH: 0
BLOOD IN STOOL: 0
BACK PAIN: 1
ABDOMINAL PAIN: 0
SHORTNESS OF BREATH: 0

## 2019-01-04 DIAGNOSIS — N28.89 RIGHT RENAL MASS: ICD-10-CM

## 2019-01-04 RX ORDER — OXYCODONE HYDROCHLORIDE 15 MG/1
15 TABLET ORAL EVERY 8 HOURS PRN
Qty: 90 TABLET | Refills: 0 | Status: SHIPPED | OUTPATIENT
Start: 2019-01-04 | End: 2019-02-04 | Stop reason: SDUPTHER

## 2019-01-14 ENCOUNTER — TELEPHONE (OUTPATIENT)
Dept: FAMILY MEDICINE CLINIC | Age: 59
End: 2019-01-14

## 2019-01-16 DIAGNOSIS — Z12.11 COLON CANCER SCREENING: Primary | ICD-10-CM

## 2019-02-04 DIAGNOSIS — N28.89 RIGHT RENAL MASS: ICD-10-CM

## 2019-02-04 RX ORDER — OXYCODONE HYDROCHLORIDE 15 MG/1
15 TABLET ORAL EVERY 8 HOURS PRN
Qty: 90 TABLET | Refills: 0 | Status: SHIPPED | OUTPATIENT
Start: 2019-02-04 | End: 2019-03-06 | Stop reason: SDUPTHER

## 2019-02-25 RX ORDER — GLIPIZIDE 5 MG/1
TABLET ORAL
Qty: 45 TABLET | Refills: 0 | Status: SHIPPED | OUTPATIENT
Start: 2019-02-25 | End: 2019-03-26 | Stop reason: SDUPTHER

## 2019-03-06 DIAGNOSIS — N28.89 RIGHT RENAL MASS: ICD-10-CM

## 2019-03-06 RX ORDER — OXYCODONE HYDROCHLORIDE 15 MG/1
15 TABLET ORAL EVERY 8 HOURS PRN
Qty: 90 TABLET | Refills: 0 | Status: SHIPPED | OUTPATIENT
Start: 2019-03-06 | End: 2019-04-05 | Stop reason: SDUPTHER

## 2019-03-22 ENCOUNTER — OFFICE VISIT (OUTPATIENT)
Dept: FAMILY MEDICINE CLINIC | Age: 59
End: 2019-03-22
Payer: COMMERCIAL

## 2019-03-22 VITALS
BODY MASS INDEX: 55.32 KG/M2 | HEIGHT: 61 IN | HEART RATE: 97 BPM | DIASTOLIC BLOOD PRESSURE: 82 MMHG | OXYGEN SATURATION: 95 % | SYSTOLIC BLOOD PRESSURE: 126 MMHG | WEIGHT: 293 LBS

## 2019-03-22 DIAGNOSIS — M54.16 RIGHT LUMBAR RADICULOPATHY: ICD-10-CM

## 2019-03-22 DIAGNOSIS — G89.4 CHRONIC PAIN SYNDROME: Primary | ICD-10-CM

## 2019-03-22 DIAGNOSIS — D22.9 MULTIPLE ATYPICAL SKIN MOLES: ICD-10-CM

## 2019-03-22 DIAGNOSIS — E11.65 UNCONTROLLED TYPE 2 DIABETES MELLITUS WITH HYPERGLYCEMIA (HCC): ICD-10-CM

## 2019-03-22 LAB — HBA1C MFR BLD: 6.7 %

## 2019-03-22 PROCEDURE — 2022F DILAT RTA XM EVC RTNOPTHY: CPT | Performed by: FAMILY MEDICINE

## 2019-03-22 PROCEDURE — G8484 FLU IMMUNIZE NO ADMIN: HCPCS | Performed by: FAMILY MEDICINE

## 2019-03-22 PROCEDURE — 83036 HEMOGLOBIN GLYCOSYLATED A1C: CPT | Performed by: FAMILY MEDICINE

## 2019-03-22 PROCEDURE — G8427 DOCREV CUR MEDS BY ELIG CLIN: HCPCS | Performed by: FAMILY MEDICINE

## 2019-03-22 PROCEDURE — 99214 OFFICE O/P EST MOD 30 MIN: CPT | Performed by: FAMILY MEDICINE

## 2019-03-22 PROCEDURE — 3017F COLORECTAL CA SCREEN DOC REV: CPT | Performed by: FAMILY MEDICINE

## 2019-03-22 PROCEDURE — 1036F TOBACCO NON-USER: CPT | Performed by: FAMILY MEDICINE

## 2019-03-22 PROCEDURE — 3044F HG A1C LEVEL LT 7.0%: CPT | Performed by: FAMILY MEDICINE

## 2019-03-22 PROCEDURE — G8417 CALC BMI ABV UP PARAM F/U: HCPCS | Performed by: FAMILY MEDICINE

## 2019-03-22 ASSESSMENT — ENCOUNTER SYMPTOMS
BACK PAIN: 1
COUGH: 0
SHORTNESS OF BREATH: 0
BLOOD IN STOOL: 0
ABDOMINAL PAIN: 0

## 2019-03-22 ASSESSMENT — PATIENT HEALTH QUESTIONNAIRE - PHQ9
SUM OF ALL RESPONSES TO PHQ QUESTIONS 1-9: 0
SUM OF ALL RESPONSES TO PHQ QUESTIONS 1-9: 0
1. LITTLE INTEREST OR PLEASURE IN DOING THINGS: 0
2. FEELING DOWN, DEPRESSED OR HOPELESS: 0
SUM OF ALL RESPONSES TO PHQ9 QUESTIONS 1 & 2: 0

## 2019-03-26 RX ORDER — GLIPIZIDE 5 MG/1
TABLET ORAL
Qty: 45 TABLET | Refills: 3 | Status: SHIPPED | OUTPATIENT
Start: 2019-03-26 | End: 2019-07-29 | Stop reason: SDUPTHER

## 2019-04-05 DIAGNOSIS — N28.89 RIGHT RENAL MASS: ICD-10-CM

## 2019-04-05 RX ORDER — OXYCODONE HYDROCHLORIDE 15 MG/1
15 TABLET ORAL EVERY 8 HOURS PRN
Qty: 90 TABLET | Refills: 0 | Status: SHIPPED | OUTPATIENT
Start: 2019-04-05 | End: 2019-05-05

## 2019-04-05 NOTE — TELEPHONE ENCOUNTER
Last Seen: 3/22/2019    Last Written: 3/6/19    Last UDS: 6/15/18    OARRS Run On: 7/5/18    Med Agreement Signed On: 7/16/12    Next Appointment: 6/21/2019    Requested Prescriptions     Pending Prescriptions Disp Refills    oxyCODONE (OXY-IR) 15 MG immediate release tablet 90 tablet 0     Sig: Take 1 tablet by mouth every 8 hours as needed for Pain for up to 30 days.

## 2019-04-10 ENCOUNTER — TELEPHONE (OUTPATIENT)
Dept: FAMILY MEDICINE CLINIC | Age: 59
End: 2019-04-10

## 2019-04-10 NOTE — TELEPHONE ENCOUNTER
Called her to let her know she needs to sign a new medication agreement but got no answer and continuous ring.

## 2019-05-06 ENCOUNTER — PATIENT MESSAGE (OUTPATIENT)
Dept: FAMILY MEDICINE CLINIC | Age: 59
End: 2019-05-06

## 2019-05-06 DIAGNOSIS — N28.89 RIGHT RENAL MASS: ICD-10-CM

## 2019-05-06 DIAGNOSIS — G89.4 CHRONIC PAIN SYNDROME: Primary | ICD-10-CM

## 2019-05-06 RX ORDER — OXYCODONE HYDROCHLORIDE 15 MG/1
15 TABLET ORAL EVERY 8 HOURS PRN
Qty: 90 TABLET | Refills: 0 | Status: SHIPPED | OUTPATIENT
Start: 2019-05-06 | End: 2019-06-05

## 2019-05-06 NOTE — TELEPHONE ENCOUNTER
From: Juan Auguste  To: Dominick Ramirez DO  Sent: 5/6/2019 12:37 AM EDT  Subject: Prescription Question    My pain medication is not on my refill list again I tried to add it myself but I didn't have the first clue if I did it correctly thank you for your time. ... p.s. Happy grand opening. .. Carmen Graham  your old patient rafael

## 2019-06-05 DIAGNOSIS — G89.4 CHRONIC PAIN SYNDROME: Primary | ICD-10-CM

## 2019-06-05 RX ORDER — OXYCODONE HYDROCHLORIDE 15 MG/1
15 TABLET ORAL EVERY 8 HOURS PRN
Qty: 90 TABLET | Refills: 0 | Status: SHIPPED | OUTPATIENT
Start: 2019-06-05 | End: 2019-07-08 | Stop reason: SDUPTHER

## 2019-06-10 ENCOUNTER — TELEPHONE (OUTPATIENT)
Dept: FAMILY MEDICINE CLINIC | Age: 59
End: 2019-06-10

## 2019-06-10 DIAGNOSIS — L97.409 ULCER OF HEEL, UNSPECIFIED LATERALITY, UNSPECIFIED ULCER STAGE (HCC): Primary | ICD-10-CM

## 2019-07-08 ENCOUNTER — PATIENT MESSAGE (OUTPATIENT)
Dept: FAMILY MEDICINE CLINIC | Age: 59
End: 2019-07-08

## 2019-07-08 DIAGNOSIS — G89.4 CHRONIC PAIN SYNDROME: ICD-10-CM

## 2019-07-08 RX ORDER — OXYCODONE HYDROCHLORIDE 15 MG/1
15 TABLET ORAL EVERY 8 HOURS PRN
Qty: 90 TABLET | Refills: 0 | Status: SHIPPED | OUTPATIENT
Start: 2019-07-08 | End: 2019-08-07 | Stop reason: SDUPTHER

## 2019-07-12 ENCOUNTER — OFFICE VISIT (OUTPATIENT)
Dept: FAMILY MEDICINE CLINIC | Age: 59
End: 2019-07-12
Payer: COMMERCIAL

## 2019-07-12 VITALS
HEIGHT: 61 IN | HEART RATE: 77 BPM | WEIGHT: 286.8 LBS | OXYGEN SATURATION: 97 % | BODY MASS INDEX: 54.15 KG/M2 | SYSTOLIC BLOOD PRESSURE: 130 MMHG | DIASTOLIC BLOOD PRESSURE: 85 MMHG

## 2019-07-12 DIAGNOSIS — G89.4 CHRONIC PAIN SYNDROME: Primary | ICD-10-CM

## 2019-07-12 DIAGNOSIS — E66.01 MORBID OBESITY (HCC): ICD-10-CM

## 2019-07-12 DIAGNOSIS — I10 ESSENTIAL HYPERTENSION: ICD-10-CM

## 2019-07-12 PROCEDURE — 99214 OFFICE O/P EST MOD 30 MIN: CPT | Performed by: FAMILY MEDICINE

## 2019-07-12 PROCEDURE — G8417 CALC BMI ABV UP PARAM F/U: HCPCS | Performed by: FAMILY MEDICINE

## 2019-07-12 PROCEDURE — G8427 DOCREV CUR MEDS BY ELIG CLIN: HCPCS | Performed by: FAMILY MEDICINE

## 2019-07-12 PROCEDURE — 1036F TOBACCO NON-USER: CPT | Performed by: FAMILY MEDICINE

## 2019-07-12 PROCEDURE — 3017F COLORECTAL CA SCREEN DOC REV: CPT | Performed by: FAMILY MEDICINE

## 2019-07-12 ASSESSMENT — ENCOUNTER SYMPTOMS
SHORTNESS OF BREATH: 0
ABDOMINAL PAIN: 0
BACK PAIN: 1
COUGH: 0
BLOOD IN STOOL: 0

## 2019-07-12 NOTE — PATIENT INSTRUCTIONS
Chronic pain syndrome  She is to continue medication as needed and try and reduce whenever possible. She can use the heating pad on the low back. Essential hypertension  Continue medications and no added salt diet. Morbid obesity ([address]  She is to continue her weight loss program.  She will be able to increase her activity later on when her foot is healed.

## 2019-08-07 ENCOUNTER — PATIENT MESSAGE (OUTPATIENT)
Dept: FAMILY MEDICINE CLINIC | Age: 59
End: 2019-08-07

## 2019-08-07 DIAGNOSIS — G89.4 CHRONIC PAIN SYNDROME: ICD-10-CM

## 2019-08-07 RX ORDER — OXYCODONE HYDROCHLORIDE 15 MG/1
15 TABLET ORAL EVERY 8 HOURS PRN
Qty: 90 TABLET | Refills: 0 | Status: SHIPPED | OUTPATIENT
Start: 2019-08-07 | End: 2019-08-10

## 2019-08-07 NOTE — TELEPHONE ENCOUNTER
Requested Prescriptions     Pending Prescriptions Disp Refills    oxyCODONE (OXY-IR) 15 MG immediate release tablet 90 tablet 0     Sig: Take 1 tablet by mouth every 8 hours as needed for Pain for up to 3 days. Last Seen: 7/12/19  Last Filled: 7/8/19  Last UDS: 6/15/19  OARRS Run On: 7/12/19  Med Agreement Signed On: 9/09/11 , needs new contract that was for Percocet.    Next Appointment: 10/18/19

## 2019-08-07 NOTE — TELEPHONE ENCOUNTER
From: Maryann Mesa  To: Pascual Lofton DO  Sent: 8/7/2019 1:01 AM EDT  Subject: Prescription Question    Merlin Sanchez    I need a refill on my pain pills. Madie pardo

## 2019-08-08 DIAGNOSIS — G89.29 OTHER CHRONIC PAIN: ICD-10-CM

## 2019-09-04 ENCOUNTER — HOSPITAL ENCOUNTER (OUTPATIENT)
Dept: WOUND CARE | Age: 59
Discharge: HOME | End: 2019-09-04
Payer: COMMERCIAL

## 2019-09-04 VITALS
RESPIRATION RATE: 18 BRPM | BODY MASS INDEX: 53.59 KG/M2 | WEIGHT: 291.2 LBS | DIASTOLIC BLOOD PRESSURE: 86 MMHG | SYSTOLIC BLOOD PRESSURE: 140 MMHG | HEIGHT: 62 IN | TEMPERATURE: 98 F

## 2019-09-04 DIAGNOSIS — Z72.0 TOBACCO USE: ICD-10-CM

## 2019-09-04 DIAGNOSIS — E11.621 DIABETIC ULCER OF RIGHT HEEL ASSOCIATED WITH TYPE 2 DIABETES MELLITUS, LIMITED TO BREAKDOWN OF SKIN (HCC): ICD-10-CM

## 2019-09-04 DIAGNOSIS — L97.411 DIABETIC ULCER OF RIGHT HEEL ASSOCIATED WITH TYPE 2 DIABETES MELLITUS, LIMITED TO BREAKDOWN OF SKIN (HCC): ICD-10-CM

## 2019-09-04 PROCEDURE — 97597 DBRDMT OPN WND 1ST 20 CM/<: CPT

## 2019-09-04 PROCEDURE — 99203 OFFICE O/P NEW LOW 30 MIN: CPT | Performed by: INTERNAL MEDICINE

## 2019-09-04 PROCEDURE — 97597 DBRDMT OPN WND 1ST 20 CM/<: CPT | Performed by: INTERNAL MEDICINE

## 2019-09-04 PROCEDURE — 99204 OFFICE O/P NEW MOD 45 MIN: CPT

## 2019-09-04 RX ORDER — LIDOCAINE 40 MG/G
CREAM TOPICAL ONCE
Status: DISCONTINUED | OUTPATIENT
Start: 2019-09-04 | End: 2019-09-05 | Stop reason: HOSPADM

## 2019-09-04 RX ORDER — OXYCODONE HYDROCHLORIDE 15 MG/1
15 TABLET ORAL EVERY 8 HOURS PRN
COMMUNITY
End: 2019-09-06 | Stop reason: SDUPTHER

## 2019-09-04 ASSESSMENT — PAIN SCALES - GENERAL: PAINLEVEL_OUTOF10: 0

## 2019-09-10 PROBLEM — Z72.0 TOBACCO USE: Status: ACTIVE | Noted: 2019-09-10

## 2019-09-10 PROBLEM — L97.411 DIABETIC ULCER OF RIGHT HEEL ASSOCIATED WITH TYPE 2 DIABETES MELLITUS, LIMITED TO BREAKDOWN OF SKIN (HCC): Status: ACTIVE | Noted: 2019-09-10

## 2019-09-10 PROBLEM — E11.621 DIABETIC ULCER OF RIGHT HEEL ASSOCIATED WITH TYPE 2 DIABETES MELLITUS, LIMITED TO BREAKDOWN OF SKIN (HCC): Status: ACTIVE | Noted: 2019-09-10

## 2019-09-10 PROBLEM — E78.5 HYPERLIPIDEMIA: Status: ACTIVE | Noted: 2019-09-10

## 2019-09-10 NOTE — H&P
[facility] Progress Note    Miguel Cagle     : 1960    DATE OF VISIT:  2019    Subjective:     Miguel Cagle is a 60 y.o. female who has a diabetic  and  neuropathic ulcer located on the foot (right, heel). Current complaint of pain in this ulcer? yes. Quality of pain: aching, burning and sharp  Timing: intermittent and stable  Severity: mild-moderate  Associated Signs/Symptoms:  swelling, drainage (moderate, clear), numbness and tingling  Other significant symptoms or pertinent ulcer history: Mrs. Geoffrey Huddleston isn't exactly sure how her ulcer began, but she first noticed what sounds like a soft, infected eschar a couple of months ago. She was initially seen in the ER, was prescribed antibiotics for infection, and then followed up with her podiatrist for a time. The health of the tissue there has improved significantly, but the ulcer is still open, so she came here for an additional opinion on ongoing wound care. I believe she had used Silvadene (and Santyl?) at one point, but her current local care is with a collagen dressing and a secondary foam, every day or two as needed. She's not been fitted with any sort of a special offloading boot or shoe, but does not walk a lot, and gets around with the help of a motorized scooter. Since the initial infection, no F/C/D, no malodor or pus. She does have some leg swelling. Additional ulcer(s) noted? no.      Ms. Geoffrey Huddleston has a past medical history of Abnormal MRI, kidney (atrophic, dysmoprhic, no discrete mass), JEAN (acute kidney injury) ([address] Anemia, Gangrenous toe ([address] Hydronephrosis, right, Necrotizing fasciitis ([address] Pneumonia, Pulmonary edema, Renal calculi, Sepsis ([address] and VRE (vancomycin-resistant Enterococci). She has a past surgical history that includes Cholecystectomy; Uterine fibroid surgery; Cystoscopy (10/26/2015);  Tubal ligation; Ureter stent placement (Bilateral, 10/01/2011); and (chronic obstructive pulmonary disease) ([facility]) J44.9    Sleep apnea G47.30       Assessment of today's active condition(s): Domínguez 1 diabetic foot ulcer related to neuropathy and initial infection; no signs of infection now, no signs of ischemia; ought to do well with ongoing debridement when needed, a bit more periwound protection, compression to decrease swelling and hopefully drainage, and some offloading. Factors contributing to occurrence and/or persistence of the chronic ulcer include edema, diabetes, shear force and obesity. Medical necessity of today's visit is shown by the above documentation. Sharp debridement is indicated today, based upon the exam findings in the ulcer(s) above. Procedure note:     Consent obtained. Time out performed per [facility] policy. Anesthetic  Anesthetic: 4% Lidocaine Liquid Topical     Using a curette, scissors and forceps, I sharply debrided the foot (right) ulcer(s) down through and including the removal of dermis. The type(s) of tissue debrided included fibrin, biofilm, necrotic/eschar and callus. Total Surface Area Debrided: 8 sq cm. The ulcers were then irrigated with normal saline solution. The procedure was completed with a small amount of bleeding, and hemostasis was with pressure. The patient tolerated the procedure well, with no significant complications. The patient's level of pain during and after the procedure was monitored. Post-debridement measurements, if different from pre-debridement, are in the flowsheet as well. Discharge plan:     Treatment in the wound care center today: Wound 09/04/19 #1, Right Heel, Diabetic Foot Ulcer, Domínguez 1, Onset 6/7/19-Dressing/Treatment: (Zinc oxide,Fibracol,4 x 4, Coby, G sgl Spandagrip). No additional Abx needed. Encouraged her to keep up with diabetic diet, good food choices, medication adherence, etc.   Added a bit of RLE compression to try to decrease edema, therefore drainage, therefore maceration.    Dispensed a Darco shoe with a PegAssist insert for offloading while ambulating. In the next couple of weeks, if the surgical shoe and insert aren't solving the offloading issue, will discuss a series of TCCs (which should also compress her optimally). If she's well compressed and well offloaded but healing is still slow, will discuss a series of cellular-tissue products to speed healing. I reminded this patient of the health risks of tobacco smoking, including cardiopulmonary disease, stroke, cancer and interference with ulcer healing, all of which can increase the risk of death. Short- and long-term benefits of smoking cessation were also reviewed. The patient has been given our patient education pamphlet, which also includes telephone and internet resources to help quit smoking. She was also encouraged to contact her primary care physician for advice, including more details about pharmacologic therapy for nicotine and smoking addiction. I spent a total of 3 minutes counseling this patient on smoking cessation today. Home treatment: good handwashing before and after any dressing changes. Cleanse ulcer with saline or soap & water before dressing change. May use Vaseline (petrolatum), Aquaphor, Aveeno, CeraVe, Cetaphil, Eucerin, Lubriderm, etc for dry skin. Dressing type for home: as above, every day or two, as needed. Written discharge instructions given to patient. Follow up in 1 week.     Electronically signed by Jacquie Wilks MD on 9/10/2019 at 2:50 PM.

## 2019-09-11 ENCOUNTER — HOSPITAL ENCOUNTER (OUTPATIENT)
Dept: WOUND CARE | Age: 59
Discharge: HOME | End: 2019-09-11
Payer: COMMERCIAL

## 2019-09-11 VITALS
HEIGHT: 62 IN | RESPIRATION RATE: 20 BRPM | TEMPERATURE: 97.1 F | SYSTOLIC BLOOD PRESSURE: 144 MMHG | BODY MASS INDEX: 52.44 KG/M2 | HEART RATE: 85 BPM | WEIGHT: 285 LBS | DIASTOLIC BLOOD PRESSURE: 89 MMHG

## 2019-09-11 DIAGNOSIS — L92.9 HYPERGRANULATION: ICD-10-CM

## 2019-09-11 PROCEDURE — 17250 CHEM CAUT OF GRANLTJ TISSUE: CPT

## 2019-09-11 PROCEDURE — 97597 DBRDMT OPN WND 1ST 20 CM/<: CPT

## 2019-09-11 PROCEDURE — 97597 DBRDMT OPN WND 1ST 20 CM/<: CPT | Performed by: INTERNAL MEDICINE

## 2019-09-11 RX ORDER — LIDOCAINE 40 MG/G
CREAM TOPICAL ONCE
Status: DISCONTINUED | OUTPATIENT
Start: 2019-09-11 | End: 2019-09-12 | Stop reason: HOSPADM

## 2019-09-11 ASSESSMENT — PAIN SCALES - GENERAL: PAINLEVEL_OUTOF10: 0

## 2019-09-12 NOTE — PLAN OF CARE
wound will be black or silver in appearance  for the next several days, this is normal.      Zinc Oxide (Desitin) to sanjeev-wound  Fibracol to wound  Cover with dry dressing (4x4's, jakob)  Single layer medium compression spandagrip from toes to knee  Change once daily      6. Offloading --     Pressure ulcer offloading:       N/a . Neuropathic ulcer offloading:  Darco shoe and PegAssist insert. 7.         Adjunctive therapies --      Date           Wnd #        Location                      CTP Rx           HBOT              NPWT                                                  Wound stable, debridement & Ag Nitrate used per MD & pt. Tolerated well. Will cont. With current wound care regime & single medium spandagrip for edema control. Pt. Wearing her Darco shoe with peg assist insert for off-loading at all times when walking. F/u in [facility] in 1 week as ordered. Discharge instructions reviewed with patient & spouse, all questions answered, copy given to patient. Dressings were applied to all wounds per M.D. Instructions at this visit.

## 2019-09-17 PROBLEM — L92.9 HYPERGRANULATION: Status: ACTIVE | Noted: 2019-09-17

## 2019-09-18 ENCOUNTER — HOSPITAL ENCOUNTER (OUTPATIENT)
Dept: WOUND CARE | Age: 59
Discharge: HOME | End: 2019-09-18
Payer: COMMERCIAL

## 2019-09-18 VITALS
BODY MASS INDEX: 53.29 KG/M2 | RESPIRATION RATE: 20 BRPM | HEART RATE: 119 BPM | SYSTOLIC BLOOD PRESSURE: 146 MMHG | TEMPERATURE: 97 F | HEIGHT: 62 IN | WEIGHT: 289.6 LBS | DIASTOLIC BLOOD PRESSURE: 86 MMHG

## 2019-09-18 PROCEDURE — 97597 DBRDMT OPN WND 1ST 20 CM/<: CPT

## 2019-09-18 PROCEDURE — 17250 CHEM CAUT OF GRANLTJ TISSUE: CPT

## 2019-09-18 PROCEDURE — 97597 DBRDMT OPN WND 1ST 20 CM/<: CPT | Performed by: INTERNAL MEDICINE

## 2019-09-18 RX ORDER — LIDOCAINE 40 MG/G
CREAM TOPICAL ONCE
Status: DISCONTINUED | OUTPATIENT
Start: 2019-09-18 | End: 2019-09-19 | Stop reason: HOSPADM

## 2019-09-18 ASSESSMENT — PAIN SCALES - GENERAL: PAINLEVEL_OUTOF10: 0

## 2019-10-02 ENCOUNTER — HOSPITAL ENCOUNTER (OUTPATIENT)
Dept: WOUND CARE | Age: 59
Discharge: HOME | End: 2019-10-02
Payer: COMMERCIAL

## 2019-10-02 VITALS
HEIGHT: 62 IN | HEART RATE: 86 BPM | WEIGHT: 289 LBS | BODY MASS INDEX: 53.18 KG/M2 | RESPIRATION RATE: 20 BRPM | SYSTOLIC BLOOD PRESSURE: 144 MMHG | DIASTOLIC BLOOD PRESSURE: 93 MMHG | TEMPERATURE: 98.4 F

## 2019-10-02 PROCEDURE — 97597 DBRDMT OPN WND 1ST 20 CM/<: CPT

## 2019-10-02 PROCEDURE — 97597 DBRDMT OPN WND 1ST 20 CM/<: CPT | Performed by: INTERNAL MEDICINE

## 2019-10-02 RX ORDER — LIDOCAINE 40 MG/G
CREAM TOPICAL ONCE
Status: DISCONTINUED | OUTPATIENT
Start: 2019-10-02 | End: 2019-10-03 | Stop reason: HOSPADM

## 2019-10-02 ASSESSMENT — PAIN SCALES - GENERAL: PAINLEVEL_OUTOF10: 0

## 2019-10-03 ENCOUNTER — OFFICE VISIT (OUTPATIENT)
Dept: DERMATOLOGY | Age: 59
End: 2019-10-03
Payer: COMMERCIAL

## 2019-10-03 DIAGNOSIS — L91.8 SKIN TAGS, MULTIPLE ACQUIRED: ICD-10-CM

## 2019-10-03 DIAGNOSIS — D48.9 NEOPLASM OF UNCERTAIN BEHAVIOR: Primary | ICD-10-CM

## 2019-10-03 DIAGNOSIS — D22.9 MULTIPLE BENIGN MELANOCYTIC NEVI: ICD-10-CM

## 2019-10-03 DIAGNOSIS — L57.8 PHOTOAGING OF SKIN: ICD-10-CM

## 2019-10-03 DIAGNOSIS — F17.200 CURRENT EVERY DAY SMOKER: ICD-10-CM

## 2019-10-03 PROCEDURE — 99203 OFFICE O/P NEW LOW 30 MIN: CPT | Performed by: DERMATOLOGY

## 2019-10-03 PROCEDURE — G8427 DOCREV CUR MEDS BY ELIG CLIN: HCPCS | Performed by: DERMATOLOGY

## 2019-10-03 PROCEDURE — 11103 TANGNTL BX SKIN EA SEP/ADDL: CPT | Performed by: DERMATOLOGY

## 2019-10-03 PROCEDURE — 11102 TANGNTL BX SKIN SINGLE LES: CPT | Performed by: DERMATOLOGY

## 2019-10-03 PROCEDURE — G8417 CALC BMI ABV UP PARAM F/U: HCPCS | Performed by: DERMATOLOGY

## 2019-10-03 PROCEDURE — G8484 FLU IMMUNIZE NO ADMIN: HCPCS | Performed by: DERMATOLOGY

## 2019-10-06 DIAGNOSIS — G89.4 CHRONIC PAIN SYNDROME: ICD-10-CM

## 2019-10-07 ENCOUNTER — TELEPHONE (OUTPATIENT)
Dept: DERMATOLOGY | Age: 59
End: 2019-10-07

## 2019-10-07 LAB — DERMATOLOGY PATHOLOGY REPORT: NORMAL

## 2019-10-07 RX ORDER — OXYCODONE HYDROCHLORIDE 15 MG/1
15 TABLET ORAL EVERY 8 HOURS PRN
Qty: 90 TABLET | Refills: 0 | Status: SHIPPED | OUTPATIENT
Start: 2019-10-07 | End: 2019-11-06 | Stop reason: SDUPTHER

## 2019-10-09 ENCOUNTER — HOSPITAL ENCOUNTER (OUTPATIENT)
Dept: WOUND CARE | Age: 59
Discharge: HOME | End: 2019-10-09
Payer: COMMERCIAL

## 2019-10-09 VITALS
HEART RATE: 72 BPM | SYSTOLIC BLOOD PRESSURE: 137 MMHG | DIASTOLIC BLOOD PRESSURE: 80 MMHG | RESPIRATION RATE: 20 BRPM | TEMPERATURE: 97 F | BODY MASS INDEX: 53.55 KG/M2 | WEIGHT: 291 LBS | HEIGHT: 62 IN

## 2019-10-09 PROCEDURE — 97597 DBRDMT OPN WND 1ST 20 CM/<: CPT | Performed by: INTERNAL MEDICINE

## 2019-10-09 PROCEDURE — 97597 DBRDMT OPN WND 1ST 20 CM/<: CPT

## 2019-10-09 RX ORDER — LIDOCAINE 40 MG/G
CREAM TOPICAL ONCE
Status: DISCONTINUED | OUTPATIENT
Start: 2019-10-09 | End: 2019-10-10 | Stop reason: HOSPADM

## 2019-10-09 ASSESSMENT — PAIN SCALES - GENERAL: PAINLEVEL_OUTOF10: 0

## 2019-10-14 ENCOUNTER — OFFICE VISIT (OUTPATIENT)
Dept: FAMILY MEDICINE CLINIC | Age: 59
End: 2019-10-14
Payer: COMMERCIAL

## 2019-10-14 VITALS
DIASTOLIC BLOOD PRESSURE: 78 MMHG | HEIGHT: 61 IN | HEART RATE: 82 BPM | BODY MASS INDEX: 54.71 KG/M2 | OXYGEN SATURATION: 88 % | SYSTOLIC BLOOD PRESSURE: 128 MMHG | WEIGHT: 289.8 LBS

## 2019-10-14 DIAGNOSIS — Z12.39 BREAST CANCER SCREENING: ICD-10-CM

## 2019-10-14 DIAGNOSIS — I10 ESSENTIAL HYPERTENSION: ICD-10-CM

## 2019-10-14 DIAGNOSIS — E11.65 UNCONTROLLED TYPE 2 DIABETES MELLITUS WITH HYPERGLYCEMIA (HCC): ICD-10-CM

## 2019-10-14 DIAGNOSIS — G89.4 CHRONIC PAIN SYNDROME: Primary | ICD-10-CM

## 2019-10-14 LAB
ANION GAP SERPL CALCULATED.3IONS-SCNC: 15 MMOL/L (ref 3–16)
BUN BLDV-MCNC: 26 MG/DL (ref 7–20)
CALCIUM SERPL-MCNC: 9.4 MG/DL (ref 8.3–10.6)
CHLORIDE BLD-SCNC: 101 MMOL/L (ref 99–110)
CO2: 28 MMOL/L (ref 21–32)
CREAT SERPL-MCNC: 1.6 MG/DL (ref 0.6–1.2)
GFR AFRICAN AMERICAN: 40
GFR NON-AFRICAN AMERICAN: 33
GLUCOSE BLD-MCNC: 141 MG/DL (ref 70–99)
HBA1C MFR BLD: 7.3 %
POTASSIUM SERPL-SCNC: 3.6 MMOL/L (ref 3.5–5.1)
SODIUM BLD-SCNC: 144 MMOL/L (ref 136–145)

## 2019-10-14 PROCEDURE — 3017F COLORECTAL CA SCREEN DOC REV: CPT | Performed by: FAMILY MEDICINE

## 2019-10-14 PROCEDURE — G8427 DOCREV CUR MEDS BY ELIG CLIN: HCPCS | Performed by: FAMILY MEDICINE

## 2019-10-14 PROCEDURE — 2022F DILAT RTA XM EVC RTNOPTHY: CPT | Performed by: FAMILY MEDICINE

## 2019-10-14 PROCEDURE — 99214 OFFICE O/P EST MOD 30 MIN: CPT | Performed by: FAMILY MEDICINE

## 2019-10-14 PROCEDURE — G8417 CALC BMI ABV UP PARAM F/U: HCPCS | Performed by: FAMILY MEDICINE

## 2019-10-14 PROCEDURE — 83036 HEMOGLOBIN GLYCOSYLATED A1C: CPT | Performed by: FAMILY MEDICINE

## 2019-10-14 PROCEDURE — 4004F PT TOBACCO SCREEN RCVD TLK: CPT | Performed by: FAMILY MEDICINE

## 2019-10-14 PROCEDURE — G8484 FLU IMMUNIZE NO ADMIN: HCPCS | Performed by: FAMILY MEDICINE

## 2019-10-14 RX ORDER — ONDANSETRON HYDROCHLORIDE 8 MG/1
8 TABLET, FILM COATED ORAL EVERY 8 HOURS PRN
Qty: 30 TABLET | Refills: 0 | Status: SHIPPED | OUTPATIENT
Start: 2019-10-14 | End: 2020-02-11 | Stop reason: SDUPTHER

## 2019-10-14 RX ORDER — PRAVASTATIN SODIUM 20 MG
20 TABLET ORAL DAILY
Qty: 90 TABLET | Refills: 1 | Status: SHIPPED | OUTPATIENT
Start: 2019-10-14 | End: 2020-04-28

## 2019-10-14 ASSESSMENT — ENCOUNTER SYMPTOMS
CHEST TIGHTNESS: 0
BACK PAIN: 1
SHORTNESS OF BREATH: 0
ABDOMINAL PAIN: 0
COUGH: 0
BLOOD IN STOOL: 0

## 2019-10-16 ENCOUNTER — HOSPITAL ENCOUNTER (OUTPATIENT)
Dept: WOUND CARE | Age: 59
Discharge: HOME | End: 2019-10-16
Payer: COMMERCIAL

## 2019-10-16 VITALS
TEMPERATURE: 98.1 F | RESPIRATION RATE: 18 BRPM | WEIGHT: 288 LBS | SYSTOLIC BLOOD PRESSURE: 159 MMHG | HEIGHT: 62 IN | DIASTOLIC BLOOD PRESSURE: 77 MMHG | BODY MASS INDEX: 53 KG/M2 | HEART RATE: 80 BPM

## 2019-10-16 PROCEDURE — 15275 SKIN SUB GRAFT FACE/NK/HF/G: CPT | Performed by: INTERNAL MEDICINE

## 2019-10-16 PROCEDURE — C5275 LOW COST SKIN SUBSTITUTE APP: HCPCS

## 2019-10-16 RX ORDER — BLOOD-GLUCOSE METER
KIT MISCELLANEOUS
Qty: 1 KIT | Refills: 0 | Status: SHIPPED | OUTPATIENT
Start: 2019-10-16 | End: 2021-01-25 | Stop reason: SDUPTHER

## 2019-10-16 RX ORDER — LIDOCAINE 40 MG/G
CREAM TOPICAL ONCE
Status: DISCONTINUED | OUTPATIENT
Start: 2019-10-16 | End: 2019-10-17 | Stop reason: HOSPADM

## 2019-10-16 ASSESSMENT — PAIN SCALES - GENERAL: PAINLEVEL_OUTOF10: 0

## 2019-10-23 ENCOUNTER — HOSPITAL ENCOUNTER (OUTPATIENT)
Dept: WOUND CARE | Age: 59
Discharge: HOME | End: 2019-10-23
Payer: COMMERCIAL

## 2019-10-23 VITALS
SYSTOLIC BLOOD PRESSURE: 153 MMHG | BODY MASS INDEX: 53.92 KG/M2 | HEART RATE: 92 BPM | HEIGHT: 62 IN | DIASTOLIC BLOOD PRESSURE: 88 MMHG | WEIGHT: 293 LBS | TEMPERATURE: 97 F | RESPIRATION RATE: 18 BRPM

## 2019-10-23 PROCEDURE — 15275 SKIN SUB GRAFT FACE/NK/HF/G: CPT | Performed by: INTERNAL MEDICINE

## 2019-10-23 PROCEDURE — C5275 LOW COST SKIN SUBSTITUTE APP: HCPCS

## 2019-10-23 RX ORDER — LIDOCAINE 40 MG/G
CREAM TOPICAL ONCE
Status: DISCONTINUED | OUTPATIENT
Start: 2019-10-23 | End: 2019-10-24 | Stop reason: HOSPADM

## 2019-10-23 ASSESSMENT — PAIN SCALES - GENERAL: PAINLEVEL_OUTOF10: 0

## 2019-10-24 ENCOUNTER — TELEPHONE (OUTPATIENT)
Dept: FAMILY MEDICINE CLINIC | Age: 59
End: 2019-10-24

## 2019-10-24 NOTE — TELEPHONE ENCOUNTER
I called patient and attempted to schedule an appointment .   She has to work out transportation and [facility]

## 2019-10-30 ENCOUNTER — HOSPITAL ENCOUNTER (OUTPATIENT)
Dept: WOUND CARE | Age: 59
Discharge: HOME | End: 2019-10-30
Payer: COMMERCIAL

## 2019-10-30 VITALS
HEIGHT: 62 IN | SYSTOLIC BLOOD PRESSURE: 143 MMHG | BODY MASS INDEX: 53.92 KG/M2 | WEIGHT: 293 LBS | DIASTOLIC BLOOD PRESSURE: 85 MMHG | HEART RATE: 84 BPM | TEMPERATURE: 97.1 F | RESPIRATION RATE: 16 BRPM

## 2019-10-30 PROCEDURE — C5275 LOW COST SKIN SUBSTITUTE APP: HCPCS

## 2019-10-30 PROCEDURE — 15275 SKIN SUB GRAFT FACE/NK/HF/G: CPT | Performed by: INTERNAL MEDICINE

## 2019-10-30 RX ORDER — LIDOCAINE 40 MG/G
CREAM TOPICAL ONCE
Status: DISCONTINUED | OUTPATIENT
Start: 2019-10-30 | End: 2019-10-31 | Stop reason: HOSPADM

## 2019-10-30 ASSESSMENT — PAIN SCALES - GENERAL: PAINLEVEL_OUTOF10: 0

## 2019-11-06 ENCOUNTER — HOSPITAL ENCOUNTER (OUTPATIENT)
Dept: WOUND CARE | Age: 59
Discharge: HOME | End: 2019-11-06
Payer: COMMERCIAL

## 2019-11-06 VITALS
HEART RATE: 81 BPM | TEMPERATURE: 97 F | SYSTOLIC BLOOD PRESSURE: 167 MMHG | DIASTOLIC BLOOD PRESSURE: 95 MMHG | HEIGHT: 62 IN | RESPIRATION RATE: 16 BRPM | WEIGHT: 293 LBS | BODY MASS INDEX: 53.92 KG/M2

## 2019-11-06 DIAGNOSIS — G89.4 CHRONIC PAIN SYNDROME: ICD-10-CM

## 2019-11-06 PROCEDURE — 97597 DBRDMT OPN WND 1ST 20 CM/<: CPT | Performed by: INTERNAL MEDICINE

## 2019-11-06 PROCEDURE — 17250 CHEM CAUT OF GRANLTJ TISSUE: CPT | Performed by: INTERNAL MEDICINE

## 2019-11-06 PROCEDURE — 97597 DBRDMT OPN WND 1ST 20 CM/<: CPT

## 2019-11-06 RX ORDER — LIDOCAINE 40 MG/G
CREAM TOPICAL ONCE
Status: DISCONTINUED | OUTPATIENT
Start: 2019-11-06 | End: 2019-11-07 | Stop reason: HOSPADM

## 2019-11-06 ASSESSMENT — PAIN SCALES - GENERAL: PAINLEVEL_OUTOF10: 0

## 2019-11-07 RX ORDER — OXYCODONE HYDROCHLORIDE 15 MG/1
15 TABLET ORAL EVERY 8 HOURS PRN
Qty: 90 TABLET | Refills: 0 | Status: SHIPPED | OUTPATIENT
Start: 2019-11-07 | End: 2019-12-09 | Stop reason: SDUPTHER

## 2019-11-13 ENCOUNTER — HOSPITAL ENCOUNTER (OUTPATIENT)
Dept: WOUND CARE | Age: 59
Discharge: HOME | End: 2019-11-13
Payer: COMMERCIAL

## 2019-11-13 VITALS
WEIGHT: 288.13 LBS | TEMPERATURE: 97 F | HEART RATE: 81 BPM | RESPIRATION RATE: 18 BRPM | DIASTOLIC BLOOD PRESSURE: 80 MMHG | BODY MASS INDEX: 53.02 KG/M2 | SYSTOLIC BLOOD PRESSURE: 145 MMHG | HEIGHT: 62 IN

## 2019-11-13 PROCEDURE — C5275 LOW COST SKIN SUBSTITUTE APP: HCPCS

## 2019-11-13 PROCEDURE — 15275 SKIN SUB GRAFT FACE/NK/HF/G: CPT | Performed by: INTERNAL MEDICINE

## 2019-11-13 RX ORDER — LIDOCAINE HYDROCHLORIDE 40 MG/ML
SOLUTION TOPICAL ONCE
Status: DISCONTINUED | OUTPATIENT
Start: 2019-11-13 | End: 2019-11-14 | Stop reason: HOSPADM

## 2019-11-20 ENCOUNTER — HOSPITAL ENCOUNTER (OUTPATIENT)
Dept: WOUND CARE | Age: 59
Discharge: HOME | End: 2019-11-20
Payer: COMMERCIAL

## 2019-11-20 VITALS
TEMPERATURE: 97.4 F | HEIGHT: 62 IN | BODY MASS INDEX: 52.85 KG/M2 | WEIGHT: 287.2 LBS | RESPIRATION RATE: 82 BRPM | DIASTOLIC BLOOD PRESSURE: 90 MMHG | HEART RATE: 82 BPM | SYSTOLIC BLOOD PRESSURE: 139 MMHG

## 2019-11-20 PROCEDURE — 99213 OFFICE O/P EST LOW 20 MIN: CPT | Performed by: INTERNAL MEDICINE

## 2019-11-20 PROCEDURE — 99212 OFFICE O/P EST SF 10 MIN: CPT

## 2019-11-20 RX ORDER — LIDOCAINE 40 MG/G
CREAM TOPICAL ONCE
Status: DISCONTINUED | OUTPATIENT
Start: 2019-11-20 | End: 2019-11-21 | Stop reason: HOSPADM

## 2019-11-20 ASSESSMENT — PAIN SCALES - GENERAL: PAINLEVEL_OUTOF10: 0

## 2019-11-27 ENCOUNTER — HOSPITAL ENCOUNTER (OUTPATIENT)
Dept: WOUND CARE | Age: 59
Discharge: HOME | End: 2019-11-27
Payer: COMMERCIAL

## 2019-11-27 VITALS
TEMPERATURE: 97.2 F | WEIGHT: 282 LBS | BODY MASS INDEX: 51.89 KG/M2 | HEIGHT: 62 IN | DIASTOLIC BLOOD PRESSURE: 92 MMHG | SYSTOLIC BLOOD PRESSURE: 166 MMHG | HEART RATE: 77 BPM | RESPIRATION RATE: 18 BRPM

## 2019-11-27 PROCEDURE — 97597 DBRDMT OPN WND 1ST 20 CM/<: CPT

## 2019-11-27 PROCEDURE — 97597 DBRDMT OPN WND 1ST 20 CM/<: CPT | Performed by: INTERNAL MEDICINE

## 2019-11-27 RX ORDER — LIDOCAINE 40 MG/G
CREAM TOPICAL ONCE
Status: DISCONTINUED | OUTPATIENT
Start: 2019-11-27 | End: 2019-11-28 | Stop reason: HOSPADM

## 2019-11-27 ASSESSMENT — PAIN SCALES - GENERAL: PAINLEVEL_OUTOF10: 0

## 2019-12-03 PROBLEM — L92.9 HYPERGRANULATION: Status: RESOLVED | Noted: 2019-09-17 | Resolved: 2019-12-03

## 2019-12-04 ENCOUNTER — HOSPITAL ENCOUNTER (OUTPATIENT)
Dept: WOUND CARE | Age: 59
Discharge: HOME | End: 2019-12-04
Payer: COMMERCIAL

## 2019-12-04 VITALS
RESPIRATION RATE: 18 BRPM | HEIGHT: 62 IN | SYSTOLIC BLOOD PRESSURE: 137 MMHG | DIASTOLIC BLOOD PRESSURE: 85 MMHG | TEMPERATURE: 98.4 F | WEIGHT: 293 LBS | HEART RATE: 84 BPM | BODY MASS INDEX: 53.92 KG/M2

## 2019-12-04 PROCEDURE — 97597 DBRDMT OPN WND 1ST 20 CM/<: CPT

## 2019-12-04 PROCEDURE — 97597 DBRDMT OPN WND 1ST 20 CM/<: CPT | Performed by: INTERNAL MEDICINE

## 2019-12-04 RX ORDER — LIDOCAINE HYDROCHLORIDE 40 MG/ML
SOLUTION TOPICAL ONCE
Status: DISCONTINUED | OUTPATIENT
Start: 2019-12-04 | End: 2019-12-05 | Stop reason: HOSPADM

## 2019-12-09 ENCOUNTER — PATIENT MESSAGE (OUTPATIENT)
Dept: FAMILY MEDICINE CLINIC | Age: 59
End: 2019-12-09

## 2019-12-09 DIAGNOSIS — G89.4 CHRONIC PAIN SYNDROME: ICD-10-CM

## 2019-12-09 RX ORDER — OXYCODONE HYDROCHLORIDE 15 MG/1
15 TABLET ORAL EVERY 8 HOURS PRN
Qty: 90 TABLET | Refills: 0 | Status: SHIPPED | OUTPATIENT
Start: 2019-12-09 | End: 2019-12-10 | Stop reason: SDUPTHER

## 2019-12-10 ENCOUNTER — PATIENT MESSAGE (OUTPATIENT)
Dept: FAMILY MEDICINE CLINIC | Age: 59
End: 2019-12-10

## 2019-12-10 DIAGNOSIS — G89.4 CHRONIC PAIN SYNDROME: ICD-10-CM

## 2019-12-10 RX ORDER — OXYCODONE HYDROCHLORIDE 15 MG/1
15 TABLET ORAL EVERY 8 HOURS PRN
Qty: 90 TABLET | Refills: 0 | Status: SHIPPED | OUTPATIENT
Start: 2019-12-10 | End: 2019-12-11 | Stop reason: SDUPTHER

## 2019-12-11 ENCOUNTER — OFFICE VISIT (OUTPATIENT)
Dept: PSYCHOLOGY | Age: 59
End: 2019-12-11
Payer: COMMERCIAL

## 2019-12-11 ENCOUNTER — HOSPITAL ENCOUNTER (OUTPATIENT)
Dept: WOUND CARE | Age: 59
Discharge: HOME | End: 2019-12-11
Payer: COMMERCIAL

## 2019-12-11 VITALS
HEIGHT: 62 IN | BODY MASS INDEX: 53.07 KG/M2 | TEMPERATURE: 98.3 F | DIASTOLIC BLOOD PRESSURE: 80 MMHG | HEART RATE: 75 BPM | SYSTOLIC BLOOD PRESSURE: 144 MMHG | WEIGHT: 288.4 LBS | RESPIRATION RATE: 21 BRPM

## 2019-12-11 DIAGNOSIS — F17.210 CIGARETTE NICOTINE DEPENDENCE WITHOUT COMPLICATION: ICD-10-CM

## 2019-12-11 DIAGNOSIS — F41.9 ANXIETY: Primary | ICD-10-CM

## 2019-12-11 PROCEDURE — 97597 DBRDMT OPN WND 1ST 20 CM/<: CPT

## 2019-12-11 PROCEDURE — 90791 PSYCH DIAGNOSTIC EVALUATION: CPT | Performed by: SOCIAL WORKER

## 2019-12-11 PROCEDURE — 4004F PT TOBACCO SCREEN RCVD TLK: CPT | Performed by: SOCIAL WORKER

## 2019-12-11 PROCEDURE — 97597 DBRDMT OPN WND 1ST 20 CM/<: CPT | Performed by: INTERNAL MEDICINE

## 2019-12-11 RX ORDER — OXYCODONE HYDROCHLORIDE 15 MG/1
15 TABLET ORAL EVERY 8 HOURS PRN
Qty: 90 TABLET | Refills: 0 | Status: SHIPPED | OUTPATIENT
Start: 2019-12-11 | End: 2020-01-10 | Stop reason: SDUPTHER

## 2019-12-11 RX ORDER — LIDOCAINE 40 MG/G
CREAM TOPICAL ONCE
Status: DISCONTINUED | OUTPATIENT
Start: 2019-12-11 | End: 2019-12-12 | Stop reason: HOSPADM

## 2019-12-11 ASSESSMENT — PAIN SCALES - GENERAL
PAINLEVEL_OUTOF10: 0
PAINLEVEL_OUTOF10: 0

## 2019-12-11 ASSESSMENT — PATIENT HEALTH QUESTIONNAIRE - PHQ9
SUM OF ALL RESPONSES TO PHQ QUESTIONS 1-9: 2
1. LITTLE INTEREST OR PLEASURE IN DOING THINGS: 1
2. FEELING DOWN, DEPRESSED OR HOPELESS: 1
SUM OF ALL RESPONSES TO PHQ QUESTIONS 1-9: 2
SUM OF ALL RESPONSES TO PHQ9 QUESTIONS 1 & 2: 2

## 2019-12-18 ENCOUNTER — HOSPITAL ENCOUNTER (OUTPATIENT)
Dept: WOUND CARE | Age: 59
Discharge: HOME | End: 2019-12-18
Payer: COMMERCIAL

## 2019-12-18 VITALS
SYSTOLIC BLOOD PRESSURE: 157 MMHG | HEIGHT: 62 IN | RESPIRATION RATE: 18 BRPM | HEART RATE: 76 BPM | WEIGHT: 286.6 LBS | DIASTOLIC BLOOD PRESSURE: 83 MMHG | OXYGEN SATURATION: 95 % | BODY MASS INDEX: 52.74 KG/M2 | TEMPERATURE: 97.3 F

## 2019-12-18 PROCEDURE — 97597 DBRDMT OPN WND 1ST 20 CM/<: CPT | Performed by: INTERNAL MEDICINE

## 2019-12-18 PROCEDURE — 97597 DBRDMT OPN WND 1ST 20 CM/<: CPT

## 2019-12-18 RX ORDER — LIDOCAINE 40 MG/G
CREAM TOPICAL ONCE
Status: DISCONTINUED | OUTPATIENT
Start: 2019-12-18 | End: 2019-12-20 | Stop reason: HOSPADM

## 2019-12-18 ASSESSMENT — PAIN SCALES - GENERAL: PAINLEVEL_OUTOF10: 0

## 2019-12-31 ENCOUNTER — HOSPITAL ENCOUNTER (OUTPATIENT)
Dept: WOUND CARE | Age: 59
Discharge: HOME | End: 2019-12-31
Payer: COMMERCIAL

## 2019-12-31 VITALS
BODY MASS INDEX: 53 KG/M2 | WEIGHT: 288 LBS | SYSTOLIC BLOOD PRESSURE: 115 MMHG | HEIGHT: 62 IN | HEART RATE: 67 BPM | TEMPERATURE: 97.1 F | RESPIRATION RATE: 20 BRPM | DIASTOLIC BLOOD PRESSURE: 70 MMHG

## 2019-12-31 PROCEDURE — 97597 DBRDMT OPN WND 1ST 20 CM/<: CPT | Performed by: INTERNAL MEDICINE

## 2019-12-31 PROCEDURE — 97597 DBRDMT OPN WND 1ST 20 CM/<: CPT

## 2019-12-31 RX ORDER — LIDOCAINE 40 MG/G
CREAM TOPICAL ONCE
Status: DISCONTINUED | OUTPATIENT
Start: 2019-12-31 | End: 2020-01-01 | Stop reason: HOSPADM

## 2019-12-31 ASSESSMENT — PAIN SCALES - GENERAL
PAINLEVEL_OUTOF10: 0
PAINLEVEL_OUTOF10: 0

## 2020-01-02 NOTE — PROGRESS NOTES
[facility] Progress Note    Jaden Aguilera     : 1960    DATE OF VISIT:  2019    Subjective:     Andres Novak is a 60 y.o. female who has a diabetic  and  neuropathic ulcer located on the foot (right, heel). Significant symptoms or pertinent wound history since last visit: feeling well overall; football dressing lasted the two weeks since she was last here. Admits to smoking a bit more over the holidays, but is eager to get back on track with cessation plans. Just went to [facility] this AM and picked up her new shoes and inserts. Additional ulcer(s) noted? no.      Her current medication list consists of Dulaglutide, aspirin, blood glucose test strips, chlorthalidone, glipiZIDE, glucose monitoring kit, ondansetron, oxyCODONE, pravastatin, and pregabalin. Allergies: Dilaudid [hydromorphone hcl] and Septra [sulfamethoxazole-trimethoprim]    Objective:     Vitals:    19 1127   BP: 115/70   Pulse: 67   Resp: 20   Temp: 97.1 °F (36.2 °C)   TempSrc: Oral   Weight: 288 lb (130.6 kg)   Height: 5' 1.5\" (1.562 m)     AAOx3, overweight, NAD  Intact distal pulses, foot warm, good cap refill  Mild RLE edema, no cellulitis, angitis, fluctuance  Decreased pedal sensation, no evolving deformities, no acute arthritis  Lillian-ulcer skin: pink, warm, dry, hyperkeratotic and callus. Ulcer(s): very small, red, granular, fibrin, a small rim of epidermal necrosis, no signs of infection, less distal signs of ongoing friction. Photos also saved in electronic chart.     Today's wound measurements, per RN documentation:  Wound 19 #1, Right Heel, Diabetic Foot Ulcer, Domínguez 1, Onset 19-Wound Length (cm): 0.2 cm    Wound 19 #1, Right Heel, Diabetic Foot Ulcer, Domínguez 1, Onset 19-Wound Width (cm): 0.1 cm    Wound 19 #1, Right Heel, Diabetic Foot Ulcer, Domínguez 1, Onset 19-Wound Depth (cm): 0.1 cm    Assessment:     Patient Active Problem List amount of bleeding, and hemostasis was with pressure. The patient tolerated the procedure well, with no significant complications. The patient's level of pain during and after the procedure was monitored. Post-debridement measurements, if different from pre-debridement, are in the flowsheet as well. Discharge plan:     Treatment in the wound care center today, per RN documentation: Wound 09/04/19 #1, Right Heel, Diabetic Foot Ulcer, Domínguez 1, Onset 6/7/19-Dressing/Treatment: Other (comment)(benzoin,betadine,puracol ag,mepilex trans. football dressing ). Keep dressing in place and dry for the week. Keep up the good work with glucose control. Offloading additionally with the Darco shoe / PegAssist insert on the right, her new diabetic shoe and custom insert on the left, and her scooter for traveling distances of any real length outside the home. FU with [facility] re: stress mgmt, smoking cessation. Written discharge instructions given to patient. Follow up in 1 week.     Electronically signed by Jose G Sanchez MD on 1/2/2020 at 8:21 AM.

## 2020-01-06 RX ORDER — PREGABALIN 150 MG/1
CAPSULE ORAL
Qty: 60 CAPSULE | Refills: 3 | Status: SHIPPED | OUTPATIENT
Start: 2020-01-06 | End: 2020-05-01

## 2020-01-08 ENCOUNTER — HOSPITAL ENCOUNTER (OUTPATIENT)
Dept: WOUND CARE | Age: 60
Discharge: HOME | End: 2020-01-08
Payer: COMMERCIAL

## 2020-01-08 VITALS
TEMPERATURE: 97.1 F | WEIGHT: 285.6 LBS | SYSTOLIC BLOOD PRESSURE: 190 MMHG | HEIGHT: 62 IN | BODY MASS INDEX: 52.56 KG/M2 | DIASTOLIC BLOOD PRESSURE: 93 MMHG | HEART RATE: 96 BPM | RESPIRATION RATE: 18 BRPM

## 2020-01-08 PROCEDURE — 99212 OFFICE O/P EST SF 10 MIN: CPT | Performed by: INTERNAL MEDICINE

## 2020-01-08 PROCEDURE — 99211 OFF/OP EST MAY X REQ PHY/QHP: CPT

## 2020-01-08 RX ORDER — LIDOCAINE 40 MG/G
CREAM TOPICAL ONCE
Status: DISCONTINUED | OUTPATIENT
Start: 2020-01-08 | End: 2020-01-08

## 2020-01-08 ASSESSMENT — PAIN SCALES - GENERAL: PAINLEVEL_OUTOF10: 0

## 2020-01-10 RX ORDER — OXYCODONE HYDROCHLORIDE 15 MG/1
15 TABLET ORAL EVERY 8 HOURS PRN
Qty: 90 TABLET | Refills: 0 | Status: SHIPPED | OUTPATIENT
Start: 2020-01-10 | End: 2020-02-09 | Stop reason: SDUPTHER

## 2020-01-10 NOTE — TELEPHONE ENCOUNTER
Last Seen: 10/14/2019    Last Writen: 12/11/19    Last UDS: 6/15/18    Med Agreement Signed On: 7/16/12    Next Appointment: 1/17/2020    Requested Prescriptions     Pending Prescriptions Disp Refills    oxyCODONE (OXY-IR) 15 MG immediate release tablet 90 tablet 0     Sig: Take 1 tablet by mouth every 8 hours as needed for Pain for up to 30 days.

## 2020-01-13 PROBLEM — E11.621 DIABETIC ULCER OF RIGHT HEEL ASSOCIATED WITH TYPE 2 DIABETES MELLITUS, LIMITED TO BREAKDOWN OF SKIN (HCC): Status: RESOLVED | Noted: 2019-09-10 | Resolved: 2020-01-13

## 2020-01-13 PROBLEM — L97.411 DIABETIC ULCER OF RIGHT HEEL ASSOCIATED WITH TYPE 2 DIABETES MELLITUS, LIMITED TO BREAKDOWN OF SKIN (HCC): Status: RESOLVED | Noted: 2019-09-10 | Resolved: 2020-01-13

## 2020-01-13 PROBLEM — R60.0 BILATERAL LOWER EXTREMITY EDEMA: Status: ACTIVE | Noted: 2020-01-13

## 2020-01-14 ENCOUNTER — OFFICE VISIT (OUTPATIENT)
Dept: PSYCHOLOGY | Age: 60
End: 2020-01-14
Payer: COMMERCIAL

## 2020-01-14 PROCEDURE — 90832 PSYTX W PT 30 MINUTES: CPT | Performed by: SOCIAL WORKER

## 2020-01-14 PROCEDURE — 4004F PT TOBACCO SCREEN RCVD TLK: CPT | Performed by: SOCIAL WORKER

## 2020-01-14 ASSESSMENT — PATIENT HEALTH QUESTIONNAIRE - PHQ9
1. LITTLE INTEREST OR PLEASURE IN DOING THINGS: 0
SUM OF ALL RESPONSES TO PHQ QUESTIONS 1-9: 0
2. FEELING DOWN, DEPRESSED OR HOPELESS: 0
SUM OF ALL RESPONSES TO PHQ QUESTIONS 1-9: 0
SUM OF ALL RESPONSES TO PHQ9 QUESTIONS 1 & 2: 0

## 2020-01-14 NOTE — PROGRESS NOTES
Every Day Smoker     Packs/day: 0.50     Types: Cigarettes     Last attempt to quit: 1/10/2016     Years since quittin.0    Smokeless tobacco: Never Used    Tobacco comment: Dr. Hermilo Gordillo - Oct 2019   Substance and Sexual Activity    Alcohol use: No     Alcohol/week: 0.0 standard drinks    Drug use: No    Sexual activity: Not on file   Lifestyle    Physical activity:     Days per week: Not on file     Minutes per session: Not on file    Stress: Not on file   Relationships    Social connections:     Talks on phone: Not on file     Gets together: Not on file     Attends Taoism service: Not on file     Active member of club or organization: Not on file     Attends meetings of clubs or organizations: Not on file     Relationship status: Not on file    Intimate partner violence:     Fear of current or ex partner: Not on file     Emotionally abused: Not on file     Physically abused: Not on file     Forced sexual activity: Not on file   Other Topics Concern    Not on file   Social History Narrative    Not on file       TOBACCO:   reports that she has been smoking cigarettes. She has been smoking about 0.50 packs per day. She has never used smokeless tobacco.  ETOH:   reports no history of alcohol use. Family History:   Family History   Problem Relation Age of Onset    Alcohol Abuse Father     Cancer Father     Diabetes Maternal Aunt     Diabetes Paternal Aunt      [facility] Trevor 36 Scores 2020 2019 3/22/2019 2018 2017   PHQ2 Score 0 2 0 0 0   PHQ9 Score 0 2 0 0 0     Interpretation of Total Score Depression Severity: 1-4 = Minimal depression, 5-9 = Mild depression, 10-14 = Moderate depression, 15-19 = Moderately severe depression, 20-27 = Severe depression      A:  Pt endorses some improvement since last visit. Enjoying book on boundaries. No SI/HI, insight and motivation good. Diagnosis:   Anxiety disorder Not Otherwise Specified  Nicotine dependence      Past Diagnosis:      Diagnosis

## 2020-01-14 NOTE — PROGRESS NOTES
[facility] Progress Note    Ray Willoughby     : 1960    DATE OF VISIT:  2020    Subjective:     Ray Willoughby is a 60 y.o. female who has a diabetic  and  neuropathic ulcer located on the foot (right, plantar, heel). Current complaint of pain in this ulcer? yes. Quality of pain: burning  Timing: intermittent  Severity: mild  Associated Signs/Symptoms: none  Other significant symptoms or pertinent ulcer history: feeling well overall, left shoe seems to be fitting well, glucoses doing well, still smoking but also still determined to quit at some point; sees her counselor again soon. Additional ulcer(s) noted? no.      Ms. Amanda Boyd has a past medical history of Abnormal MRI, kidney (atrophic, dysmoprhic, no discrete mass), JEAN (acute kidney injury) ([address] Anemia, Diabetic ulcer of right heel ([address] Gangrenous toe ([address] Hydronephrosis, right, Necrotizing fasciitis ([address] Pneumonia, Pulmonary edema, Renal calculi, Sepsis ([address] and VRE (vancomycin-resistant Enterococci). She has a past surgical history that includes Cholecystectomy; Uterine fibroid surgery; Cystoscopy (10/26/2015); Tubal ligation; Ureter stent placement (Bilateral, 10/01/2011); and Lithotripsy (Bilateral, 2015). Her family history includes Alcohol Abuse in her father; Cancer in her father; Diabetes in her maternal aunt and paternal aunt. Ms. Amanda Boyd reports that she has been smoking cigarettes. She has been smoking about 0.50 packs per day. She has never used smokeless tobacco. She reports that she does not drink alcohol or use drugs. Her current medication list consists of Dulaglutide, aspirin, blood glucose test strips, chlorthalidone, glipiZIDE, glucose monitoring kit, ondansetron, oxyCODONE, pravastatin, and pregabalin.     Allergies: Dilaudid [hydromorphone hcl] and Septra [sulfamethoxazole-trimethoprim]    Pertinent items from the review of systems are discussed in

## 2020-01-14 NOTE — PATIENT INSTRUCTIONS
1.  Review the handout below on cognitive distortions, we will build on this next appt  2. Continue reading boundaries  3. Have fun today and happy anniversary  4. Return to see Nena Nance in 2 weeks. `  All or Nothing Thinking refers to your tendency to evaluate your personal qualities in extreme, black or white categories. E.g. Straight A student who receives a B on an exam concludes \"now I'm a failure\". All or nothing thinking forms the basis for perfectionism. It causes you to fear any mistake or imperfection because you will then see yourself as a complete loser, and you feel inadequate and worthless. This way of life is unrealistic because life is rarely completely either one way or the other. For example, no one is absolutely brilliant or totally stupid. Absolutes do not exist in this universe. If you try to force your experiences into absolutes categories, you will be constantly depressed because your perception will not conform to reality. You will set yourself up for discrediting yourself endlessly because whatever you do will never measure up to your exaggerated expectations. Overgeneralization  When you overgeneralize, you arbitrarily conclude that one thing that happened to you once will occur over and over again. Since what happened is invariable unpleasant, you feel upset. E.g.  A shy, young man mustered up his courage to ask a girl for a date. When she politely declined because of a previous engagement, he said to himself, \"I'm never going to get a date. No girl would ever want a date with me. I'll be lonely and miserable for the rest of my life. \"      In his distorted cognitions, he concluded that because she turned him down once, she would always do so, and that since all women have 100 percent identical tastes, he would endlessnessly and repeatedly be rejected by any eligible woman on the face of the earth.     Mental Filter is when you pick out a negative detail in any situation and dwell on it exclusively, thus perceiving that the whole situation is negative. E.g. A depressed young college student overheard some other students making fun of her best friend. She became furious because she was thinking \"That is what the human race is basically like-cruel and insensitive! \"    She was overlooking the fact that in the previous months, few people, if any, had been cruel or insensitive to her. When you are depressed, you wear a pair of eyeglasses with special lenses that filter out any positive. All that you allow to enter your conscious mind is negative. Because you are not aware of this \"filtering process\", you conclude that everything is negative. This is known as \"selective abstraction\". It is a bad habit that will cause you to suffer much needless anguish. Disqualifying the Positive is the persistent tendency of some individuals to transform neutral or even positive experiences into negative ones. You don't just ignore positive experiences, you cleverly and swiftly turn them into nightmarish opposites. E.g. An everyday example of this would be the way most of us have been conditioned to respond to compliments. When someone praises your appearance or your work, you might automatically tell yourself, \"they're just being nice. \"  With one camejo blow, you mentally disqualify their compliment. You do the same thing when you tell them, \"Oh, it was nothing, really. \"    If you constantly throw cold water on the good things that happen, no wonder life seems damp and chilly to you! Disqualifying the positive is one of the most destructive forms of cognitive distortions. Whenever you have a negative experience, you dwell on it and conclude \"That proves what I've known all along. \"  In contrast, when you have a positive experience, you tell yourself, \"that was a fluke, it doesn't count! \".     The price you pay for this tendency is intense pattern may act as a self -fulfilling prophecy and set up a negative interaction in a relationship when none exists in the first place. Jumping to Conclusions is when you arbitrarily jump to a negative conclusion that is not justified by the facts of the situation. \"fortune telling error\" is as if you had a crystal ball that foretold misery for you. You imagine that something bad is about to happen, and you take this prediction as a fact even though it is unrealistic.      E.g.  A  repeatedly told herself during anxiety attacks, \"I'm going to pass out or go crazy. \"  These predictions were unrealistic because she had never once passed out (or gone crazy!) in her entire life. Nor did she have any serious symptoms to suggest impending insanity. This negative prediction about her prognosis caused her to feel hopeless and out of control. Magnification and Minimization or \"binocular trick\" is when you are either blowing things up out of proportion or shrinking them. Magnification usually occurs when you look at your own errors, fears, or imperfections and exaggerate their importance. E.g. \"My God-I made a mistake. How terrible. My reputation will be ruined! \"      You are looking at your faults through the end of binoculars that makes them appear gigantic and grotesque. This is also called \"catastrophizing\" because you turn commonplace negative events into nightmarish monsters. When you think about your strengths, you may do the opposite- look through the wrong end of the binoculars so that things look small and unimportant. If you magnify your imperfections and minimize your good points, you're guaranteed to feel inferior. The problem isn't you-it's the crazy lens you're wearing! Emotional Reasoning is when you take your emotions as evidence for the truth. Your logic \"I feel like dud, therefore I am a dud! \"  This kind of reasoning is misleading because your feelings reflect your thoughts and beliefs. If they are distorted, as is quite often the case, your emotions will have no validity. E.g  \"I feel guilty. Therefore, I must have done something bad. \"  \"I feel overwhelmed and hopeless. Therefore my problems must be impossible to solve. \"  \"I'm not in the mood to do anything. Therefore, I might as well just lie in bed. \"  Or \"I'm mad at you. This proves that you've been acting rotten and trying to take advantage of me. \"    Emotional reasoning plays a role in nearly all of your depressions. Because things feel so negative to you, you assume they truly are. It doesn't occur to you to challenge the validity of the perceptions that create your feelings. One unusual side effect of emotional reasoning is procrastination. You avoid cleaning up your house because you tell yourself, \"I feel lousy when I think about that messy house, cleaning it will be impossible. \"  Six months later you finally give yourself a little push and do it. It turns out to be quite gratifying and not so tough after all. You were fooling yourself all along becaue you are in the habit of letting your negative feelings guide the way you act. Should Statements is when you try to motivate yourself by saying \"I should do this\" or \"I must do that. \"  These statements cause you to feel pressured and resentful. Paradoxically, you end up feeling apathetic and unmotivated. Should statements generate a lot of unnecessary emotional turmoil in your daily life. When the reality of your own behavior falls short of your standards, your shoulds and shouldn'ts create self loathing, shame and guilt. When the all-too-human performance of other people falls short of your expectations, as will inevitably happen from time to time, you'll feel bitter and self righteous. Lars Math either have to change your expectations to approximate reality or always feel let down by human behavior.  When you direct should statements towards others, you will usually feel frustrated. Labeling and Mislabeling. Personal labeling means creating a completely negative self image based on your errors. It's an extreme form of overgeneralization. There is a good chance you are involved in a personal labeling whenever you describe your mistakes with sentences beginning with \"I'm a . Jose Soler \"      E.g. When you miss your putt on the eighteenth hole, you might say \"I'm a born loser\" instead of \"I goofed up on my putt. \"      Labeling yourself is not only self defeating, it is irrational.  Your self cannot be equated with any one thing you do. Your life is a complex and ever-changing flow and thoughts, emotions, and actions. Stop trying to define yourself with negative labels-they are overly simplistic and wrong. Would you think of yourself as an \"eater\" simply because you eat. When you label other people, you will invariably generate hostility.      E.g. A boss who sees his occasionally irritable  as Pennelope Bridges uncooperative bitch. \"  Because of this label, he resents her and jumps at every chance to criticize her. She in turn, labels him as \"insensitive chauvinist\" and complains about him at every opportunity. So, around and around they go at each other's throats, focusing on every weakness or imperfection as proof of the other's worthlessness. Mislabeling involves describing an event with words that are inaccurate and emotionally heavily loaded. For example, a woman on a diet ate a dish of ice cream and thought \"how disgusting and repulsive of me. I'm a pig. \" These thoughts made her so upset she ate the whole quart of ice cream.

## 2020-01-17 ENCOUNTER — OFFICE VISIT (OUTPATIENT)
Dept: FAMILY MEDICINE CLINIC | Age: 60
End: 2020-01-17
Payer: COMMERCIAL

## 2020-01-17 VITALS
DIASTOLIC BLOOD PRESSURE: 78 MMHG | WEIGHT: 293 LBS | SYSTOLIC BLOOD PRESSURE: 138 MMHG | BODY MASS INDEX: 54.69 KG/M2 | OXYGEN SATURATION: 93 % | HEART RATE: 79 BPM

## 2020-01-17 LAB — HBA1C MFR BLD: 6.6 %

## 2020-01-17 PROCEDURE — 3044F HG A1C LEVEL LT 7.0%: CPT | Performed by: FAMILY MEDICINE

## 2020-01-17 PROCEDURE — 4004F PT TOBACCO SCREEN RCVD TLK: CPT | Performed by: FAMILY MEDICINE

## 2020-01-17 PROCEDURE — G8417 CALC BMI ABV UP PARAM F/U: HCPCS | Performed by: FAMILY MEDICINE

## 2020-01-17 PROCEDURE — 3017F COLORECTAL CA SCREEN DOC REV: CPT | Performed by: FAMILY MEDICINE

## 2020-01-17 PROCEDURE — G8427 DOCREV CUR MEDS BY ELIG CLIN: HCPCS | Performed by: FAMILY MEDICINE

## 2020-01-17 PROCEDURE — 99214 OFFICE O/P EST MOD 30 MIN: CPT | Performed by: FAMILY MEDICINE

## 2020-01-17 PROCEDURE — 83036 HEMOGLOBIN GLYCOSYLATED A1C: CPT | Performed by: FAMILY MEDICINE

## 2020-01-17 PROCEDURE — 2022F DILAT RTA XM EVC RTNOPTHY: CPT | Performed by: FAMILY MEDICINE

## 2020-01-17 PROCEDURE — G8484 FLU IMMUNIZE NO ADMIN: HCPCS | Performed by: FAMILY MEDICINE

## 2020-01-17 ASSESSMENT — ENCOUNTER SYMPTOMS
COUGH: 0
BLOOD IN STOOL: 0
CHEST TIGHTNESS: 0
BACK PAIN: 1
SHORTNESS OF BREATH: 0
ABDOMINAL PAIN: 0

## 2020-01-17 NOTE — PROGRESS NOTES
She is well-developed. She is obese. HENT:      Head: Normocephalic. Mouth/Throat:      Mouth: Mucous membranes are moist.      Pharynx: Oropharynx is clear. Eyes:      General: No scleral icterus. Conjunctiva/sclera: Conjunctivae normal.   Neck:      Musculoskeletal: Neck supple. Thyroid: No thyromegaly. Vascular: No carotid bruit. Cardiovascular:      Rate and Rhythm: Normal rate and regular rhythm. Heart sounds: Normal heart sounds. Pulmonary:      Effort: Pulmonary effort is normal.      Breath sounds: Normal breath sounds. Abdominal:      General: Bowel sounds are normal. There is no distension. Palpations: Abdomen is soft. There is no mass. Tenderness: There is no tenderness. Musculoskeletal:      Comments: In WC--tender,mild spasm & reduced rom L spine   Lymphadenopathy:      Cervical: No cervical adenopathy. Skin:     General: Skin is warm and dry. Coloration: Skin is not pale. Neurological:      Mental Status: She is alert and oriented to person, place, and time. Cranial Nerves: No cranial nerve deficit. Motor: No abnormal muscle tone. Coordination: Coordination normal.      Deep Tendon Reflexes: Reflexes normal.   Psychiatric:         Mood and Affect: Mood normal.         Behavior: Behavior normal.         Thought Content: Thought content normal.         Judgment: Judgment normal.         Assessment:       Diagnosis Orders   1. Screening for breast cancer  NAZ DIGITAL SCREEN W OR WO CAD BILATERAL   2. Chronic pain syndrome     3. Essential hypertension     4. Uncontrolled type 2 diabetes mellitus with hyperglycemia (HCC)  POCT glycosylated hemoglobin (Hb A1C)   5. CKD stage G3a/A1, GFR 45-59 and albumin creatinine ratio <30 mg/g (HCC)           Plan:      Aster Muse was seen today for 3 month follow-up.     Diagnoses and all orders for this visit:    Screening for breast cancer  -     NAZ DIGITAL SCREEN W OR WO CAD BILATERAL; Future  Mammogram soon  Chronic pain syndrome  Continue meds-LB ex's-use heat-see me 3 mos  Essential hypertension  Continue meds-RENETTA diet-work on wt loss  Uncontrolled type 2 diabetes mellitus with hyperglycemia (HCC)  -     POCT glycosylated hemoglobin (Hb A1C)  AIC 6.6-continue meds-lower carbs  CKD stage G3a/A1, GFR 45-59 and albumin creatinine ratio <30 mg/g (HCC)  Will refer to [facility], DO

## 2020-01-28 RX ORDER — GLIPIZIDE 5 MG/1
TABLET ORAL
Qty: 135 TABLET | Refills: 1 | Status: SHIPPED | OUTPATIENT
Start: 2020-01-28 | End: 2020-07-30

## 2020-02-10 NOTE — TELEPHONE ENCOUNTER
.  Last Seen: 1/17/2020    Last Written: 1/10/20 90 tablet 0 refills     Last UDS: 6/15/18     OARRS Run On: 10/5/18     Med Agreement Signed On: N/A    Next Appointment: 4/20/2020    Requested Prescriptions     Pending Prescriptions Disp Refills    oxyCODONE (OXY-IR) 15 MG immediate release tablet 90 tablet 0     Sig: Take 1 tablet by mouth every 8 hours as needed for Pain for up to 30 days.

## 2020-02-11 RX ORDER — OXYCODONE HYDROCHLORIDE 15 MG/1
15 TABLET ORAL EVERY 8 HOURS PRN
Qty: 90 TABLET | Refills: 0 | Status: SHIPPED | OUTPATIENT
Start: 2020-02-11 | End: 2020-03-11 | Stop reason: SDUPTHER

## 2020-02-12 RX ORDER — ONDANSETRON HYDROCHLORIDE 8 MG/1
8 TABLET, FILM COATED ORAL EVERY 8 HOURS PRN
Qty: 30 TABLET | Refills: 0 | Status: SHIPPED | OUTPATIENT
Start: 2020-02-12 | End: 2020-05-18 | Stop reason: SDUPTHER

## 2020-02-12 NOTE — TELEPHONE ENCOUNTER
.  Last office visit 1/17/2020     Last written 10-14-19 30 with 0      Next office visit scheduled 4/20/2020    Requested Prescriptions     Pending Prescriptions Disp Refills    ondansetron (ZOFRAN) 8 MG tablet 30 tablet 0     Sig: Take 1 tablet by mouth every 8 hours as needed for Nausea or Vomiting

## 2020-03-11 ENCOUNTER — TELEPHONE (OUTPATIENT)
Dept: FAMILY MEDICINE CLINIC | Age: 60
End: 2020-03-11

## 2020-03-11 RX ORDER — OXYCODONE HYDROCHLORIDE 15 MG/1
15 TABLET ORAL EVERY 8 HOURS PRN
Qty: 90 TABLET | Refills: 0 | Status: SHIPPED | OUTPATIENT
Start: 2020-03-11 | End: 2020-04-08 | Stop reason: SDUPTHER

## 2020-04-09 RX ORDER — OXYCODONE HYDROCHLORIDE 15 MG/1
15 TABLET ORAL EVERY 8 HOURS PRN
Qty: 90 TABLET | Refills: 0 | Status: SHIPPED | OUTPATIENT
Start: 2020-04-09 | End: 2020-05-11 | Stop reason: SDUPTHER

## 2020-04-09 NOTE — TELEPHONE ENCOUNTER
.  Last office visit 1/17/2020     Last written 3/11/20 90 with 0      Next office visit scheduled 4/20/2020    Requested Prescriptions     Pending Prescriptions Disp Refills    oxyCODONE (OXY-IR) 15 MG immediate release tablet 90 tablet 0     Sig: Take 1 tablet by mouth every 8 hours as needed for Pain for up to 30 days.

## 2020-05-10 ENCOUNTER — PATIENT MESSAGE (OUTPATIENT)
Dept: FAMILY MEDICINE CLINIC | Age: 60
End: 2020-05-10

## 2020-05-11 RX ORDER — OXYCODONE HYDROCHLORIDE 15 MG/1
15 TABLET ORAL EVERY 8 HOURS PRN
Qty: 90 TABLET | Refills: 0 | Status: SHIPPED | OUTPATIENT
Start: 2020-05-11 | End: 2020-06-10 | Stop reason: SDUPTHER

## 2020-05-11 NOTE — TELEPHONE ENCOUNTER
.  Last office visit 1/17/2020     Last written 4/9/2020 90 with 0      Next office visit scheduled 7/27/2020    Requested Prescriptions     Pending Prescriptions Disp Refills    oxyCODONE (OXY-IR) 15 MG immediate release tablet 90 tablet 0     Sig: Take 1 tablet by mouth every 8 hours as needed for Pain for up to 30 days.

## 2020-05-11 NOTE — TELEPHONE ENCOUNTER
From: Joanne Mora  To: Sharon Nj DO  Sent: 5/10/2020 3:28 PM EDT  Subject: Prescription Question    Hello,  I need to request a refill on my pain medicine and its not on my medication list again , so I have to ask for a refill on this page    Thank you,   Tyrel Zimmerman.

## 2020-06-10 ENCOUNTER — TELEPHONE (OUTPATIENT)
Dept: FAMILY MEDICINE CLINIC | Age: 60
End: 2020-06-10

## 2020-06-11 RX ORDER — OXYCODONE HYDROCHLORIDE 15 MG/1
15 TABLET ORAL EVERY 8 HOURS PRN
Qty: 90 TABLET | Refills: 0 | Status: SHIPPED | OUTPATIENT
Start: 2020-06-11 | End: 2020-07-14 | Stop reason: SDUPTHER

## 2020-07-14 RX ORDER — OXYCODONE HYDROCHLORIDE 15 MG/1
15 TABLET ORAL EVERY 8 HOURS PRN
Qty: 90 TABLET | Refills: 0 | Status: SHIPPED | OUTPATIENT
Start: 2020-07-14 | End: 2020-08-13 | Stop reason: SDUPTHER

## 2020-07-27 ENCOUNTER — OFFICE VISIT (OUTPATIENT)
Dept: FAMILY MEDICINE CLINIC | Age: 60
End: 2020-07-27
Payer: COMMERCIAL

## 2020-07-27 VITALS
OXYGEN SATURATION: 93 % | HEIGHT: 61 IN | SYSTOLIC BLOOD PRESSURE: 140 MMHG | BODY MASS INDEX: 52.98 KG/M2 | TEMPERATURE: 97 F | DIASTOLIC BLOOD PRESSURE: 80 MMHG | HEART RATE: 94 BPM | WEIGHT: 280.6 LBS

## 2020-07-27 LAB
A/G RATIO: 1.1 (ref 1.1–2.2)
ALBUMIN SERPL-MCNC: 4 G/DL (ref 3.4–5)
ALP BLD-CCNC: 155 U/L (ref 40–129)
ALT SERPL-CCNC: 44 U/L (ref 10–40)
ANION GAP SERPL CALCULATED.3IONS-SCNC: 13 MMOL/L (ref 3–16)
AST SERPL-CCNC: 38 U/L (ref 15–37)
BASOPHILS ABSOLUTE: 0 K/UL (ref 0–0.2)
BASOPHILS RELATIVE PERCENT: 0.6 %
BILIRUB SERPL-MCNC: 0.3 MG/DL (ref 0–1)
BUN BLDV-MCNC: 13 MG/DL (ref 7–20)
CALCIUM SERPL-MCNC: 9.6 MG/DL (ref 8.3–10.6)
CHLORIDE BLD-SCNC: 101 MMOL/L (ref 99–110)
CHOLESTEROL, TOTAL: 155 MG/DL (ref 0–199)
CO2: 28 MMOL/L (ref 21–32)
CREAT SERPL-MCNC: 1.4 MG/DL (ref 0.6–1.2)
EOSINOPHILS ABSOLUTE: 0.2 K/UL (ref 0–0.6)
EOSINOPHILS RELATIVE PERCENT: 3.8 %
GFR AFRICAN AMERICAN: 46
GFR NON-AFRICAN AMERICAN: 38
GLOBULIN: 3.6 G/DL
GLUCOSE BLD-MCNC: 112 MG/DL (ref 70–99)
HBA1C MFR BLD: 7.2 %
HCT VFR BLD CALC: 49.3 % (ref 36–48)
HDLC SERPL-MCNC: 37 MG/DL (ref 40–60)
HEMOGLOBIN: 16.4 G/DL (ref 12–16)
LDL CHOLESTEROL CALCULATED: 86 MG/DL
LYMPHOCYTES ABSOLUTE: 2.1 K/UL (ref 1–5.1)
LYMPHOCYTES RELATIVE PERCENT: 31.3 %
MCH RBC QN AUTO: 30.1 PG (ref 26–34)
MCHC RBC AUTO-ENTMCNC: 33.3 G/DL (ref 31–36)
MCV RBC AUTO: 90.5 FL (ref 80–100)
MONOCYTES ABSOLUTE: 0.4 K/UL (ref 0–1.3)
MONOCYTES RELATIVE PERCENT: 6.8 %
NEUTROPHILS ABSOLUTE: 3.8 K/UL (ref 1.7–7.7)
NEUTROPHILS RELATIVE PERCENT: 57.5 %
PDW BLD-RTO: 15.8 % (ref 12.4–15.4)
PLATELET # BLD: 226 K/UL (ref 135–450)
PMV BLD AUTO: 7.5 FL (ref 5–10.5)
POTASSIUM SERPL-SCNC: 3.6 MMOL/L (ref 3.5–5.1)
RBC # BLD: 5.45 M/UL (ref 4–5.2)
SODIUM BLD-SCNC: 142 MMOL/L (ref 136–145)
TOTAL PROTEIN: 7.6 G/DL (ref 6.4–8.2)
TRIGL SERPL-MCNC: 161 MG/DL (ref 0–150)
VLDLC SERPL CALC-MCNC: 32 MG/DL
WBC # BLD: 6.6 K/UL (ref 4–11)

## 2020-07-27 PROCEDURE — 3017F COLORECTAL CA SCREEN DOC REV: CPT | Performed by: FAMILY MEDICINE

## 2020-07-27 PROCEDURE — G8417 CALC BMI ABV UP PARAM F/U: HCPCS | Performed by: FAMILY MEDICINE

## 2020-07-27 PROCEDURE — G8427 DOCREV CUR MEDS BY ELIG CLIN: HCPCS | Performed by: FAMILY MEDICINE

## 2020-07-27 PROCEDURE — 99214 OFFICE O/P EST MOD 30 MIN: CPT | Performed by: FAMILY MEDICINE

## 2020-07-27 PROCEDURE — 4004F PT TOBACCO SCREEN RCVD TLK: CPT | Performed by: FAMILY MEDICINE

## 2020-07-27 PROCEDURE — 2022F DILAT RTA XM EVC RTNOPTHY: CPT | Performed by: FAMILY MEDICINE

## 2020-07-27 PROCEDURE — 83036 HEMOGLOBIN GLYCOSYLATED A1C: CPT | Performed by: FAMILY MEDICINE

## 2020-07-27 PROCEDURE — 3051F HG A1C>EQUAL 7.0%<8.0%: CPT | Performed by: FAMILY MEDICINE

## 2020-07-27 ASSESSMENT — ENCOUNTER SYMPTOMS
CHEST TIGHTNESS: 0
COUGH: 0
BACK PAIN: 1
SHORTNESS OF BREATH: 0
BLOOD IN STOOL: 0
CONSTIPATION: 0
ABDOMINAL PAIN: 0

## 2020-07-27 NOTE — PATIENT INSTRUCTIONS
Chronic pain syndrome  Continue medications as needed and try to reduce when possible should be doing some low back stretches as tolerated and may use heating pad if needed  Type 2 diabetes mellitus with hyperglycemia, unspecified whether long term insulin use (HCC)  -     POCT glycosylated hemoglobin (Hb A1C)  A1c 7.2 last visit was 6.7-has lost 14 pounds and states she will continue to slowly lose some more weight.   Essential hypertension  Continue medications and no added salt diet-weight loss as above  Morbid obesity (HCC)  Weight loss as above-increase activity as tolerated    See me video visit in 3 months and in office 6 months     Ash Garber, DO

## 2020-07-27 NOTE — PROGRESS NOTES
Subjective:      Patient ID: Elisa Olvera is a 60 y.o. female. HPI  Patient in for checkup for several medical issues. Chronic pain-continues to have issues with her chronic low back pain with occasional radiation into the left leg--medication keeps her able to be more active and constructive. Diabetes-she states she has lost 14 pounds since she was here last.  Hypertension-blood pressure 130/80 or below when she checks it at home or elsewhere. Obesity- she is working on slow weight loss and as above has lost 14 pounds. Overall she states she is doing fairly well. Prior to Visit Medications :  Medication oxyCODONE (OXY-IR) 15 MG immediate release tablet, Sig Take 1 tablet by mouth every 8 hours as needed for Pain for up to 30 days. , Taking? Yes, Authorizing Provider Ash Garber, DO    Medication ondansetron (ZOFRAN) 8 MG tablet, Sig Take 1 tablet by mouth every 8 hours as needed for Nausea or Vomiting, Taking? Yes, Authorizing Provider Ash Garber, DO    Medication pravastatin (PRAVACHOL) 20 MG tablet, Sig TAKE ONE TABLET BY MOUTH DAILY, Taking? Yes, Authorizing Provider Ash Garber, DO    Medication chlorthalidone (HYGROTON) 25 MG tablet, Sig TAKE ONE TABLET BY MOUTH DAILY, Taking? Yes, Authorizing Provider Ash Garber, DO    Medication glipiZIDE (GLUCOTROL) 5 MG tablet, Sig TAKE ONE AND ONE-HALF (1 & 1/2) TABLET BY MOUTH EVERY MORNING, Taking? Yes, Authorizing Provider Ash Garber, DO    Medication Dulaglutide 1.5 MG/0.5ML SOPN, Sig INJECT 1.5 MG UNDER THE SKIN ONCE WEEKLY, Taking? Yes, Authorizing Provider Ash Garber, DO    Medication glucose monitoring kit (FREESTYLE) monitoring kit, Sig One Touch Ultra Meter  Dx E11.9-on meds-no insulin, Taking? Yes, Authorizing Provider Ash Garber, DO    Medication ONE TOUCH ULTRA TEST strip, Sig USE TO CHECK BLOOD GLUCOSE LEVELS TWO TIMES A DAY, Taking?  Yes, Authorizing Provider Ash Garber, DO    Medication aspirin 81 MG tablet, Sig Take 81 mg by mouth daily, Taking? Yes, Authorizing Provider Historical Provider, MD    Medication pregabalin (LYRICA) 150 MG capsule, Sig TAKE ONE CAPSULE BY MOUTH TWICE A DAY, Taking? , Authorizing Provider Maurilio Macdonald DO      Past Medical History:  05/25/2016: Abnormal MRI, kidney (atrophic, dysmoprhic, no discrete   mass)  July, 2015: JEAN (acute kidney injury) [facility])      Comment:  Nephrologist- Dr Ela Ibrahim  No date: Anemia  10/2019: Diabetic ulcer of right heel ([address]  9/30/2015: Gangrenous toe ([address]  7/12/2015: Hydronephrosis, right  2009: Necrotizing fasciitis ([address]      Comment:  left groin  No date: Pneumonia  No date: Pulmonary edema  No date: Renal calculi      Comment:  Rt staghorn--non obstr on Lt-as of 10/1  2015: Sepsis ([address]      Comment:  July - 2015 and August 2015  10/21/15: VRE (vancomycin-resistant Enterococci)      Comment:  urine        Review of Systems    Review of Systems   Constitutional: Negative for fever. Lost 14 lbs   HENT: Negative for congestion and postnasal drip. Eyes: Negative for visual disturbance. Respiratory: Negative for cough, chest tightness and shortness of breath. Cardiovascular: Negative for chest pain, palpitations and leg swelling. Gastrointestinal: Negative for abdominal pain, blood in stool and constipation. Genitourinary: Negative for dysuria, frequency and hematuria. Musculoskeletal: Positive for arthralgias and back pain. Negative for myalgias. Skin: Negative for rash. Neurological: Negative for tremors and headaches. Psychiatric/Behavioral: Negative for sleep disturbance. The patient is not nervous/anxious. Objective:   Physical Exam      Physical Exam  Constitutional:       Appearance: Normal appearance. She is well-developed. She is obese. HENT:      Head: Normocephalic. Mouth/Throat:      Mouth: Mucous membranes are moist.      Pharynx: Oropharynx is clear. Eyes:      General: No scleral icterus. Conjunctiva/sclera: Conjunctivae normal.   Neck:      Musculoskeletal: Neck supple. Thyroid: No thyromegaly. Vascular: No carotid bruit. Cardiovascular:      Rate and Rhythm: Normal rate and regular rhythm. Heart sounds: Normal heart sounds. Comments: Venous stasis anterior lower one third of both legs-no edema  Pulmonary:      Effort: Pulmonary effort is normal.      Breath sounds: Normal breath sounds. Abdominal:      General: Bowel sounds are normal. There is no distension. Palpations: Abdomen is soft. There is no mass. Tenderness: There is no abdominal tenderness. Musculoskeletal: Normal range of motion. General: No tenderness. Right lower leg: No edema. Left lower leg: No edema. Comments: Tender,mild spasm,reduced rom L spine--uses scooter when outside of house   Lymphadenopathy:      Cervical: No cervical adenopathy. Skin:     General: Skin is warm and dry. Coloration: Skin is not pale. Neurological:      Mental Status: She is alert and oriented to person, place, and time. Motor: No abnormal muscle tone. Gait: Gait abnormal.      Comments: Limps when ambulating due to back-intermittent and barely noticeable fine tremor of left hand-no family history. Psychiatric:         Mood and Affect: Mood normal.         Behavior: Behavior normal.         Thought Content: Thought content normal.         Judgment: Judgment normal.         Assessment:       Diagnosis Orders   1. Chronic pain syndrome     2. Type 2 diabetes mellitus with hyperglycemia, unspecified whether long term insulin use (HCC)  POCT glycosylated hemoglobin (Hb A1C)   3. Essential hypertension     4. Morbid obesity ([address]           Plan:      Darrian Gomez was seen today for 3 month follow-up.     Diagnoses and all orders for this visit:    Chronic pain syndrome  Continue medications as needed and try to reduce when possible should be doing some low back stretches as tolerated and may use heating pad if needed  Type 2 diabetes mellitus with hyperglycemia, unspecified whether long term insulin use (HCC)  -     POCT glycosylated hemoglobin (Hb A1C)  A1c 7.2 last visit was 6.7-has lost 14 pounds and states she will continue to slowly lose some more weight.   Essential hypertension  Continue medications and no added salt diet-weight loss as above  Morbid obesity (HCC)  Weight loss as above-increase activity as tolerated    See me video visit in 3 months and in office 6 months     Ash Garber, DO

## 2020-07-28 LAB — TSH SERPL DL<=0.05 MIU/L-ACNC: 3.24 UIU/ML (ref 0.27–4.2)

## 2020-07-29 RX ORDER — ONDANSETRON HYDROCHLORIDE 8 MG/1
8 TABLET, FILM COATED ORAL EVERY 8 HOURS PRN
Qty: 30 TABLET | Refills: 1 | Status: SHIPPED | OUTPATIENT
Start: 2020-07-29 | End: 2021-01-25 | Stop reason: SDUPTHER

## 2020-08-13 RX ORDER — OXYCODONE HYDROCHLORIDE 15 MG/1
15 TABLET ORAL EVERY 8 HOURS PRN
Qty: 90 TABLET | Refills: 0 | Status: SHIPPED | OUTPATIENT
Start: 2020-08-13 | End: 2020-09-14 | Stop reason: SDUPTHER

## 2020-08-13 NOTE — TELEPHONE ENCOUNTER
Refill Request - Controlled Substance    Last Seen: 7/27/2020       Last Written: #90  0rf  7/14/2020    Last UDS: 6/15/2018    Med Agreement Signed On: 4/17/2019    Next Appointment: 10/28/2020        Requested Prescriptions     Pending Prescriptions Disp Refills    oxyCODONE (OXY-IR) 15 MG immediate release tablet 90 tablet 0     Sig: Take 1 tablet by mouth every 8 hours as needed for Pain for up to 30 days.

## 2020-09-13 ENCOUNTER — PATIENT MESSAGE (OUTPATIENT)
Dept: FAMILY MEDICINE CLINIC | Age: 60
End: 2020-09-13

## 2020-09-14 RX ORDER — OXYCODONE HYDROCHLORIDE 15 MG/1
15 TABLET ORAL EVERY 8 HOURS PRN
Qty: 90 TABLET | Refills: 0 | Status: SHIPPED | OUTPATIENT
Start: 2020-09-14 | End: 2020-10-15 | Stop reason: SDUPTHER

## 2020-09-14 NOTE — TELEPHONE ENCOUNTER
Last Seen: 7/27/2020    Last Writen: 8/13/20    Last UDS: 6/15/18    OARRS Run On:     Med Agreement Signed On: no contract    Next Appointment: 10/28/2020

## 2020-09-14 NOTE — TELEPHONE ENCOUNTER
From: Aldo Mcallister  To: Venice Silverio DO  Sent: 9/13/2020 2:48 PM EDT  Subject: Prescription Question    Good morning dr. John Tapia   My oxycodone is not on my refill page again, so I have to request it in this form. Thank you   Madie pardo

## 2020-10-15 ENCOUNTER — PATIENT MESSAGE (OUTPATIENT)
Dept: FAMILY MEDICINE CLINIC | Age: 60
End: 2020-10-15

## 2020-10-15 RX ORDER — OXYCODONE HYDROCHLORIDE 15 MG/1
15 TABLET ORAL EVERY 8 HOURS PRN
Qty: 90 TABLET | Refills: 0 | Status: SHIPPED | OUTPATIENT
Start: 2020-10-15 | End: 2020-11-16 | Stop reason: SDUPTHER

## 2020-10-15 NOTE — TELEPHONE ENCOUNTER
From: Boogie Lan  To: Elton Lester DO  Sent: 10/15/2020 1:45 AM EDT  Subject: Prescription Question    I need a refill on my pain meds it wasnt on my med page    Ty rafael suresh

## 2020-10-28 ENCOUNTER — VIRTUAL VISIT (OUTPATIENT)
Dept: FAMILY MEDICINE CLINIC | Age: 60
End: 2020-10-28
Payer: COMMERCIAL

## 2020-10-28 PROCEDURE — 3051F HG A1C>EQUAL 7.0%<8.0%: CPT | Performed by: FAMILY MEDICINE

## 2020-10-28 PROCEDURE — 2022F DILAT RTA XM EVC RTNOPTHY: CPT | Performed by: FAMILY MEDICINE

## 2020-10-28 PROCEDURE — G8428 CUR MEDS NOT DOCUMENT: HCPCS | Performed by: FAMILY MEDICINE

## 2020-10-28 PROCEDURE — 4004F PT TOBACCO SCREEN RCVD TLK: CPT | Performed by: FAMILY MEDICINE

## 2020-10-28 PROCEDURE — 99213 OFFICE O/P EST LOW 20 MIN: CPT | Performed by: FAMILY MEDICINE

## 2020-10-28 PROCEDURE — G8417 CALC BMI ABV UP PARAM F/U: HCPCS | Performed by: FAMILY MEDICINE

## 2020-10-28 PROCEDURE — 3017F COLORECTAL CA SCREEN DOC REV: CPT | Performed by: FAMILY MEDICINE

## 2020-10-28 PROCEDURE — G8484 FLU IMMUNIZE NO ADMIN: HCPCS | Performed by: FAMILY MEDICINE

## 2020-10-28 RX ORDER — PREGABALIN 150 MG/1
CAPSULE ORAL
Qty: 60 CAPSULE | Refills: 5 | Status: SHIPPED | OUTPATIENT
Start: 2020-10-28 | End: 2021-04-26

## 2020-10-28 ASSESSMENT — ENCOUNTER SYMPTOMS
SHORTNESS OF BREATH: 0
BACK PAIN: 1
CONSTIPATION: 0
ABDOMINAL PAIN: 0
COUGH: 0
BLOOD IN STOOL: 0
CHEST TIGHTNESS: 0

## 2020-10-28 NOTE — PROGRESS NOTES
Subjective:      Patient ID: Graham Daigle is a 63 y.o. female. HPI  This is a video visit with the patient due to the ongoing virus in the community. She is at [facility] [place]-we have permission for the call and I am in my office at [facility]. Overall she is doing very well. Diabetes-morning blood sugars always below 130. Hypertension-blood pressure 140/80 or below when she checks it at home or elsewhere. Obesity day she is always working on losing some weight by reducing carbs. Chronic pain-medication doing okay-allows her to be a little more active than productive. She denies any other issues to discuss. Prior to Visit Medications :  Medication pregabalin (LYRICA) 150 MG capsule, Sig TAKE ONE CAPSULE BY MOUTH TWICE A DAY, Taking? Yes, Authorizing Provider Ash Garber, DO    Medication oxyCODONE (OXY-IR) 15 MG immediate release tablet, Sig Take 1 tablet by mouth every 8 hours as needed for Pain for up to 30 days. , Taking? , Authorizing Provider Ash Garber, DO    Medication glipiZIDE (GLUCOTROL) 5 MG tablet, Sig TAKE 1 AND 1/2 TABLET BY MOUTH EVERY MORNING, Taking? , Authorizing Provider Ash Garber, DO    Medication chlorthalidone (HYGROTON) 25 MG tablet, Sig TAKE ONE TABLET BY MOUTH DAILY, Taking? , Authorizing Provider William Garber, DO    Medication ondansetron (ZOFRAN) 8 MG tablet, Sig Take 1 tablet by mouth every 8 hours as needed for Nausea or Vomiting, Taking? , Authorizing Provider Ash Garber, DO    Medication pravastatin (PRAVACHOL) 20 MG tablet, Sig TAKE ONE TABLET BY MOUTH DAILY, Taking? , Authorizing Provider William Garber, DO    Medication Dulaglutide 1.5 MG/0.5ML SOPN, Sig INJECT 1.5 MG UNDER THE SKIN ONCE WEEKLY, Taking? , Authorizing Provider William Garber, DO    Medication glucose monitoring kit (FREESTYLE) monitoring kit, Sig One Touch Ultra Meter  Dx E11.9-on meds-no insulin, Taking? , Authorizing Provider William Garber, DO    Medication ONE TOUCH ULTRA TEST strip, Sig USE TO CHECK BLOOD GLUCOSE LEVELS TWO TIMES A DAY, Taking? , Authorizing Provider Ash Garber, DO    Medication aspirin 81 MG tablet, Sig Take 81 mg by mouth daily, Taking? , Authorizing Provider Historical Provider, MD      Past Medical History:  05/25/2016: Abnormal MRI, kidney (atrophic, dysmoprhic, no discrete   mass)  July, 2015: JEAN (acute kidney injury) ([address]      Comment:  Nephrologist- Dr Ethan Cabrera  No date: Anemia  10/2019: Diabetic ulcer of right heel ([address]  9/30/2015: Gangrenous toe ([address]  7/12/2015: Hydronephrosis, right  2009: Necrotizing fasciitis ([address]      Comment:  left groin  No date: Pneumonia  No date: Pulmonary edema  No date: Renal calculi      Comment:  Rt staghorn--non obstr on Lt-as of 10/1  2015: Sepsis ([address]      Comment:  July - 2015 and August 2015  10/21/15: VRE (vancomycin-resistant Enterococci)      Comment:  urine        Review of Systems    Review of Systems   Constitutional: Negative for fever and unexpected weight change. HENT: Negative for congestion and postnasal drip. Eyes: Negative for visual disturbance. Respiratory: Negative for cough, chest tightness and shortness of breath. Cardiovascular: Negative for chest pain, palpitations and leg swelling. Gastrointestinal: Negative for abdominal pain, blood in stool and constipation. Genitourinary: Negative for frequency and hematuria. Musculoskeletal: Positive for arthralgias and back pain. Negative for myalgias. Skin: Negative for rash. Neurological: Negative for tremors and headaches. Psychiatric/Behavioral: Negative for sleep disturbance. The patient is not nervous/anxious. Objective:   Physical Exam      Physical Exam  Constitutional:       Appearance: Normal appearance. She is obese. Neurological:      Mental Status: She is alert and oriented to person, place, and time.    Psychiatric:         Mood and Affect: Mood normal.         Behavior: Behavior normal. Thought Content: Thought content normal.         Judgment: Judgment normal.         Assessment:       Diagnosis Orders   1. Chronic pain syndrome     2. Type 2 diabetes mellitus with hyperglycemia, unspecified whether long term insulin use ([address]     3. Essential hypertension     4. Morbid obesity ([address]           Plan:      Jenna Deleon was seen today for 3 month follow-up. Diagnoses and all orders for this visit:    Chronic pain syndrome  Continue medication as needed-try and reduce when possible-may use heating pad if needed-should be doing some low back stretches twice a day as tolerated. Next visit see me in the office. Type 2 diabetes mellitus with hyperglycemia, unspecified whether long term insulin use (HCC)  Continue medications-weight loss as above. Essential hypertension  Continue medications and no added salt diet-weight loss as above  Morbid obesity ([address]  Weight loss as above    See me in office in 3 months  This is been a video visit of approximately 12 minutes.      Ash Garber, DO

## 2020-11-15 ENCOUNTER — PATIENT MESSAGE (OUTPATIENT)
Dept: FAMILY MEDICINE CLINIC | Age: 60
End: 2020-11-15

## 2020-11-16 RX ORDER — OXYCODONE HYDROCHLORIDE 15 MG/1
15 TABLET ORAL EVERY 8 HOURS PRN
Qty: 90 TABLET | Refills: 0 | Status: SHIPPED | OUTPATIENT
Start: 2020-11-16 | End: 2020-12-16 | Stop reason: SDUPTHER

## 2020-11-16 NOTE — TELEPHONE ENCOUNTER
From: Desmond Bran  To: Artur Bingham DO  Sent: 11/15/2020 11:53 AM EST  Subject: Prescription Question    Again my pain medicine is not on the refill list and I need them refilled please. Thank you  Madie pardo

## 2020-12-16 RX ORDER — OXYCODONE HYDROCHLORIDE 15 MG/1
15 TABLET ORAL EVERY 8 HOURS PRN
Qty: 90 TABLET | Refills: 0 | Status: SHIPPED | OUTPATIENT
Start: 2020-12-16 | End: 2021-01-14 | Stop reason: SDUPTHER

## 2020-12-16 NOTE — TELEPHONE ENCOUNTER
Refill Request - Controlled Substance    Last Seen: 10/28/2020       Last Written: 11/16/2020    Last UDS: N/A    Med Agreement Signed On: 4/17/2019    Next Appointment: 1/25/2021    Appointment scheduled    Requested Prescriptions     Pending Prescriptions Disp Refills    oxyCODONE (OXY-IR) 15 MG immediate release tablet 90 tablet 0     Sig: Take 1 tablet by mouth every 8 hours as needed for Pain for up to 30 days.

## 2021-01-14 DIAGNOSIS — G89.4 CHRONIC PAIN SYNDROME: ICD-10-CM

## 2021-01-14 RX ORDER — OXYCODONE HYDROCHLORIDE 15 MG/1
15 TABLET ORAL EVERY 8 HOURS PRN
Qty: 90 TABLET | Refills: 0 | Status: SHIPPED | OUTPATIENT
Start: 2021-01-14 | End: 2021-02-15 | Stop reason: SDUPTHER

## 2021-01-14 NOTE — TELEPHONE ENCOUNTER
Refill Request     Last Seen: 10/28/2020    Last Written: #90  0rf  12/16/2020    Next Appointment:   Future Appointments   Date Time Provider Dora Manciai   1/25/2021  3:00 PM DO INNA Masterson [facility]       Appointment scheduled      Requested Prescriptions     Pending Prescriptions Disp Refills    oxyCODONE (OXY-IR) 15 MG immediate release tablet 90 tablet 0     Sig: Take 1 tablet by mouth every 8 hours as needed for Pain for up to 30 days.

## 2021-01-25 ENCOUNTER — OFFICE VISIT (OUTPATIENT)
Dept: FAMILY MEDICINE CLINIC | Age: 61
End: 2021-01-25
Payer: COMMERCIAL

## 2021-01-25 ENCOUNTER — HOSPITAL ENCOUNTER (OUTPATIENT)
Dept: WOMENS IMAGING | Age: 61
Discharge: HOME | End: 2021-01-25
Payer: COMMERCIAL

## 2021-01-25 VITALS
SYSTOLIC BLOOD PRESSURE: 140 MMHG | DIASTOLIC BLOOD PRESSURE: 80 MMHG | BODY MASS INDEX: 55.8 KG/M2 | WEIGHT: 284.2 LBS | HEART RATE: 80 BPM | TEMPERATURE: 97.3 F | HEIGHT: 60 IN | OXYGEN SATURATION: 100 %

## 2021-01-25 DIAGNOSIS — Z12.31 SCREENING MAMMOGRAM, ENCOUNTER FOR: ICD-10-CM

## 2021-01-25 DIAGNOSIS — Z12.31 ENCOUNTER FOR SCREENING MAMMOGRAM FOR MALIGNANT NEOPLASM OF BREAST: ICD-10-CM

## 2021-01-25 DIAGNOSIS — G89.4 CHRONIC PAIN SYNDROME: Primary | ICD-10-CM

## 2021-01-25 DIAGNOSIS — G25.0 TREMOR, ESSENTIAL: ICD-10-CM

## 2021-01-25 DIAGNOSIS — E11.65 TYPE 2 DIABETES MELLITUS WITH HYPERGLYCEMIA, UNSPECIFIED WHETHER LONG TERM INSULIN USE (HCC): ICD-10-CM

## 2021-01-25 DIAGNOSIS — I10 ESSENTIAL HYPERTENSION: ICD-10-CM

## 2021-01-25 LAB — HBA1C MFR BLD: 8.2 %

## 2021-01-25 PROCEDURE — 3052F HG A1C>EQUAL 8.0%<EQUAL 9.0%: CPT | Performed by: FAMILY MEDICINE

## 2021-01-25 PROCEDURE — 4004F PT TOBACCO SCREEN RCVD TLK: CPT | Performed by: FAMILY MEDICINE

## 2021-01-25 PROCEDURE — 3017F COLORECTAL CA SCREEN DOC REV: CPT | Performed by: FAMILY MEDICINE

## 2021-01-25 PROCEDURE — G8427 DOCREV CUR MEDS BY ELIG CLIN: HCPCS | Performed by: FAMILY MEDICINE

## 2021-01-25 PROCEDURE — 83036 HEMOGLOBIN GLYCOSYLATED A1C: CPT | Performed by: FAMILY MEDICINE

## 2021-01-25 PROCEDURE — G8417 CALC BMI ABV UP PARAM F/U: HCPCS | Performed by: FAMILY MEDICINE

## 2021-01-25 PROCEDURE — 2022F DILAT RTA XM EVC RTNOPTHY: CPT | Performed by: FAMILY MEDICINE

## 2021-01-25 PROCEDURE — 77067 SCR MAMMO BI INCL CAD: CPT

## 2021-01-25 PROCEDURE — 99214 OFFICE O/P EST MOD 30 MIN: CPT | Performed by: FAMILY MEDICINE

## 2021-01-25 PROCEDURE — G8484 FLU IMMUNIZE NO ADMIN: HCPCS | Performed by: FAMILY MEDICINE

## 2021-01-25 RX ORDER — ONDANSETRON HYDROCHLORIDE 8 MG/1
8 TABLET, FILM COATED ORAL EVERY 8 HOURS PRN
Qty: 30 TABLET | Refills: 1 | Status: SHIPPED | OUTPATIENT
Start: 2021-01-25 | End: 2021-07-15 | Stop reason: SDUPTHER

## 2021-01-25 RX ORDER — BLOOD-GLUCOSE METER
KIT MISCELLANEOUS
Qty: 1 KIT | Refills: 0 | Status: SHIPPED | OUTPATIENT
Start: 2021-01-25

## 2021-01-25 ASSESSMENT — ENCOUNTER SYMPTOMS
COUGH: 0
SHORTNESS OF BREATH: 0
ABDOMINAL PAIN: 0
CONSTIPATION: 0
BLOOD IN STOOL: 0
BACK PAIN: 1
CHEST TIGHTNESS: 0

## 2021-01-25 ASSESSMENT — PATIENT HEALTH QUESTIONNAIRE - PHQ9
SUM OF ALL RESPONSES TO PHQ QUESTIONS 1-9: 1
1. LITTLE INTEREST OR PLEASURE IN DOING THINGS: 0
2. FEELING DOWN, DEPRESSED OR HOPELESS: 1
SUM OF ALL RESPONSES TO PHQ QUESTIONS 1-9: 1

## 2021-01-25 NOTE — PATIENT INSTRUCTIONS
Chronic pain syndrome  Continue medications and try to reduce when possible. Try and do some low back stretches twice a day as tolerated. Definitely get back on lowering the carbs. Type 2 diabetes mellitus with hyperglycemia, unspecified whether long term insulin use (HCC)  -     POCT glycosylated hemoglobin (Hb A1C)  A1c 8.2 and last one was 7.2-work on the lowering carbs and see me in 3 months. Essential hypertension  Continue medications and no added salt diet-limit caffeine and preferably no alcohol. Encounter for screening mammogram for malignant neoplasm of breast  -     NAZ DIGITAL SCREEN W OR WO CAD BILATERAL  Mammogram today if possible. This was a 30 to 35-minute visit. Prior to and during the visit we checked on medications, colonoscopies, immunizations and mammograms. See me in office in 3 months.

## 2021-01-25 NOTE — PROGRESS NOTES
Subjective:      Patient ID: Bruce Pereira is a 63 y.o. female. HPI  Patient in for checkup on several medical issues. Chronic pain-still has her right lumbar pain radiating into her right leg intermittently-he does use a cane all the time whether inside or outside-medication does ease her discomfort so she can the fairly active and productive without any ill effects. Diabetes-she states she has gone through 4 pounds since the last time she was here but she will get that down-A1c last visit was 7.2. Hypertension-blood pressure typically in the neighborhood of 140-145/80-90 most of the time. She is due for a mammogram which she will go [place] today and see if she can get that done. She has had 2 colonoscopies in the last several years-one was done there was a rather large polyp which was taken out in pieces and she had a recheck 6 months later which was okay and they wanted her back in 1 year which would then last summer and she states she will do it this spring or summer. Denies any other issues to discuss. Prior to Visit Medications :  Medication glucose monitoring kit (FREESTYLE) monitoring kit, Sig One Touch Ultra Meter Dx E11.9-on meds-no insulin, Taking? Yes, Authorizing Provider Ash Garber, DO    Medication ondansetron (ZOFRAN) 8 MG tablet, Sig Take 1 tablet by mouth every 8 hours as needed for Nausea or Vomiting, Taking? Yes, Authorizing Provider Ash Garber, DO    Medication oxyCODONE (OXY-IR) 15 MG immediate release tablet, Sig Take 1 tablet by mouth every 8 hours as needed for Pain for up to 30 days. , Taking? Yes, Authorizing Provider Ash Garber, DO    Medication Dulaglutide (TRULICITY) 1.5 QV/3.9BG SOPN, Sig INJECT 1.5 MG UNDER THE SKIN ONCE WEEKLY, Taking? Yes, Authorizing Provider Ash Garber, DO    Medication glipiZIDE (GLUCOTROL) 5 MG tablet, Sig TAKE 1 AND 1/2 TABLET BY MOUTH EVERY MORNING, Taking?  Yes, Authorizing Provider Umesh Garber, DO    Medication Neurological: Positive for tremors. Negative for headaches. Psychiatric/Behavioral: Negative for sleep disturbance. The patient is not nervous/anxious. Objective:   Physical Exam      Physical Exam  Constitutional:       General: She is not in acute distress. Appearance: Normal appearance. She is well-developed. She is obese. She is not ill-appearing. HENT:      Head: Normocephalic. Mouth/Throat:      Mouth: Mucous membranes are moist.      Pharynx: Oropharynx is clear. Eyes:      General: No scleral icterus. Conjunctiva/sclera: Conjunctivae normal.   Neck:      Musculoskeletal: Neck supple. Thyroid: No thyromegaly. Vascular: No carotid bruit. Cardiovascular:      Rate and Rhythm: Normal rate and regular rhythm. Heart sounds: Normal heart sounds. Pulmonary:      Effort: Pulmonary effort is normal.      Breath sounds: Normal breath sounds. Abdominal:      General: Bowel sounds are normal. There is no distension. Palpations: Abdomen is soft. There is no mass. Tenderness: There is no abdominal tenderness. Musculoskeletal:         General: No tenderness. Right lower leg: No edema. Left lower leg: No edema. Comments: Tender,mild spasm,reduced rom L spine   Lymphadenopathy:      Cervical: No cervical adenopathy. Skin:     General: Skin is warm and dry. Coloration: Skin is not pale. Neurological:      Mental Status: She is alert and oriented to person, place, and time. Motor: No abnormal muscle tone. Coordination: Coordination abnormal.      Comments: Uses cane always-very mild hand tremor-Rt worse   Psychiatric:         Mood and Affect: Mood normal.         Behavior: Behavior normal.         Thought Content: Thought content normal.         Judgment: Judgment normal.         Assessment:       Diagnosis Orders   1. Chronic pain syndrome     2.  Type 2 diabetes mellitus with hyperglycemia, unspecified whether long term insulin use ([facility]  POCT glycosylated hemoglobin (Hb A1C)   3. Essential hypertension     4. Encounter for screening mammogram for malignant neoplasm of breast  NAZ DIGITAL SCREEN W OR WO CAD BILATERAL         Plan:      Radha Rollins was seen today for diabetes and medication refill. Diagnoses and all orders for this visit:    Chronic pain syndrome  Continue medications and try to reduce when possible. Try and do some low back stretches twice a day as tolerated. Definitely get back on lowering the carbs. Type 2 diabetes mellitus with hyperglycemia, unspecified whether long term insulin use (HCC)  -     POCT glycosylated hemoglobin (Hb A1C)  A1c 8.2 and last one was 7.2-work on the lowering carbs and see me in 3 months. Essential hypertension  Continue medications and no added salt diet-limit caffeine and preferably no alcohol. Encounter for screening mammogram for malignant neoplasm of breast  -     NAZ DIGITAL SCREEN W OR WO CAD BILATERAL  Mammogram today if possible. This was a 30 to 35-minute visit. Prior to and during the visit we checked on medications, colonoscopies, immunizations and mammograms. See me in office in 3 months.      Ash Garber, DO

## 2021-02-14 ENCOUNTER — PATIENT MESSAGE (OUTPATIENT)
Dept: FAMILY MEDICINE CLINIC | Age: 61
End: 2021-02-14

## 2021-02-14 DIAGNOSIS — G89.4 CHRONIC PAIN SYNDROME: ICD-10-CM

## 2021-02-15 RX ORDER — OXYCODONE HYDROCHLORIDE 15 MG/1
15 TABLET ORAL EVERY 8 HOURS PRN
Qty: 90 TABLET | Refills: 0 | Status: SHIPPED | OUTPATIENT
Start: 2021-02-15 | End: 2021-03-14 | Stop reason: SDUPTHER

## 2021-02-15 NOTE — TELEPHONE ENCOUNTER
.  Last office visit 1/25/2021     Last written 1-14-21 90 with 0      Next office visit scheduled 4/26/2021    Requested Prescriptions     Pending Prescriptions Disp Refills    oxyCODONE (OXY-IR) 15 MG immediate release tablet 90 tablet 0     Sig: Take 1 tablet by mouth every 8 hours as needed for Pain for up to 30 days.

## 2021-02-15 NOTE — TELEPHONE ENCOUNTER
From: Wilfrid Tracy  To: Haley Levine DO  Sent: 2/14/2021 7:42 PM EST  Subject: Prescription Question    My pain medicine has timed out again and I need a refill    Thank you   Nathalia Tavarez

## 2021-03-14 DIAGNOSIS — G89.4 CHRONIC PAIN SYNDROME: ICD-10-CM

## 2021-03-15 RX ORDER — OXYCODONE HYDROCHLORIDE 15 MG/1
15 TABLET ORAL EVERY 8 HOURS PRN
Qty: 90 TABLET | Refills: 0 | Status: SHIPPED | OUTPATIENT
Start: 2021-03-15 | End: 2021-04-13 | Stop reason: SDUPTHER

## 2021-03-15 NOTE — TELEPHONE ENCOUNTER
Refill Request - Controlled Substance    Last Seen: 1/25/2021    Last Written: 2/15/2021    Last UDS: 6/15/18    Med Agreement Signed On: 4/17/19    Next Appointment: 4/26/2021    Future appointment scheduled    Requested Prescriptions     Pending Prescriptions Disp Refills    oxyCODONE (OXY-IR) 15 MG immediate release tablet 90 tablet 0     Sig: Take 1 tablet by mouth every 8 hours as needed for Pain for up to 30 days.

## 2021-04-13 DIAGNOSIS — G89.4 CHRONIC PAIN SYNDROME: ICD-10-CM

## 2021-04-14 RX ORDER — OXYCODONE HYDROCHLORIDE 15 MG/1
15 TABLET ORAL EVERY 8 HOURS PRN
Qty: 90 TABLET | Refills: 0 | Status: SHIPPED | OUTPATIENT
Start: 2021-04-14 | End: 2021-05-14 | Stop reason: SDUPTHER

## 2021-04-14 NOTE — TELEPHONE ENCOUNTER
Refill Request - Controlled Substance    Last Seen: 1/25/2021    Last Written: 3/15/21    Last UDS: 6/15/2018    Med Agreement Signed On: 4/17/19    Next Appointment: 4/26/2021    Future appointment scheduled    Requested Prescriptions     Pending Prescriptions Disp Refills    oxyCODONE (OXY-IR) 15 MG immediate release tablet 90 tablet 0     Sig: Take 1 tablet by mouth every 8 hours as needed for Pain for up to 30 days.

## 2021-04-26 ENCOUNTER — OFFICE VISIT (OUTPATIENT)
Dept: FAMILY MEDICINE CLINIC | Age: 61
End: 2021-04-26
Payer: COMMERCIAL

## 2021-04-26 VITALS
HEIGHT: 60 IN | HEART RATE: 71 BPM | OXYGEN SATURATION: 95 % | SYSTOLIC BLOOD PRESSURE: 134 MMHG | BODY MASS INDEX: 53.95 KG/M2 | WEIGHT: 274.8 LBS | DIASTOLIC BLOOD PRESSURE: 82 MMHG

## 2021-04-26 DIAGNOSIS — E11.65 TYPE 2 DIABETES MELLITUS WITH HYPERGLYCEMIA, UNSPECIFIED WHETHER LONG TERM INSULIN USE (HCC): ICD-10-CM

## 2021-04-26 DIAGNOSIS — L98.9 SKIN LESION OF HAND: ICD-10-CM

## 2021-04-26 DIAGNOSIS — I10 ESSENTIAL HYPERTENSION: ICD-10-CM

## 2021-04-26 DIAGNOSIS — G89.4 CHRONIC PAIN SYNDROME: Primary | ICD-10-CM

## 2021-04-26 LAB — HBA1C MFR BLD: 7.8 %

## 2021-04-26 PROCEDURE — 3051F HG A1C>EQUAL 7.0%<8.0%: CPT | Performed by: FAMILY MEDICINE

## 2021-04-26 PROCEDURE — 4004F PT TOBACCO SCREEN RCVD TLK: CPT | Performed by: FAMILY MEDICINE

## 2021-04-26 PROCEDURE — G8427 DOCREV CUR MEDS BY ELIG CLIN: HCPCS | Performed by: FAMILY MEDICINE

## 2021-04-26 PROCEDURE — 3017F COLORECTAL CA SCREEN DOC REV: CPT | Performed by: FAMILY MEDICINE

## 2021-04-26 PROCEDURE — 2022F DILAT RTA XM EVC RTNOPTHY: CPT | Performed by: FAMILY MEDICINE

## 2021-04-26 PROCEDURE — 83036 HEMOGLOBIN GLYCOSYLATED A1C: CPT | Performed by: FAMILY MEDICINE

## 2021-04-26 PROCEDURE — 99214 OFFICE O/P EST MOD 30 MIN: CPT | Performed by: FAMILY MEDICINE

## 2021-04-26 PROCEDURE — G8417 CALC BMI ABV UP PARAM F/U: HCPCS | Performed by: FAMILY MEDICINE

## 2021-04-26 RX ORDER — DULAGLUTIDE 3 MG/.5ML
3 INJECTION, SOLUTION SUBCUTANEOUS WEEKLY
Qty: 4 PEN | Refills: 2 | Status: SHIPPED | OUTPATIENT
Start: 2021-04-26 | End: 2021-08-23

## 2021-04-26 ASSESSMENT — ENCOUNTER SYMPTOMS
BACK PAIN: 1
ABDOMINAL PAIN: 0
SHORTNESS OF BREATH: 0
BLOOD IN STOOL: 0
CONSTIPATION: 0
COUGH: 0
CHEST TIGHTNESS: 0

## 2021-04-26 NOTE — PATIENT INSTRUCTIONS
Chronic pain syndrome    Type 2 diabetes mellitus with hyperglycemia, unspecified whether long term insulin use (HCC)  -     POCT glycosylated hemoglobin (Hb A1C)  A1c 7.8 and last 1 was 8.2-continue medications and prescription sent for an increase in the dose of Trulicity to 3 mg-see me 3 months-continue slow weight loss  Essential hypertension  Continue medications and no added salt diet-limit caffeine and preferably no alcohol. Skin lesion of hand  Comments:  webbing Lt hand-webbing between 1st & 2nd digits  See photo of hand lesion and we will recheck in 3 months and possibly apply liquid nitrogen. Other orders  -     Dulaglutide (TRULICITY) 3 DX/0.5IF SOPN; Inject 3 mg into the skin once a week  This is been a 30 to 35-minute visit with the patient.   Prior to and during the visit chart was reviewed for immunizations which she needs, mammogram which she is up-to-date and colonoscopy which she is up-to-date    See me in 3 months     Oksana Mata,

## 2021-04-26 NOTE — PROGRESS NOTES
Authorizing Provider Evonne Garber, DO    Medication glucose monitoring kit (FREESTYLE) monitoring kit, Sig One Touch Ultra Meter Dx E11.9-on meds-no insulin, Taking? Yes, Authorizing Provider Ash Garber, DO    Medication ondansetron (ZOFRAN) 8 MG tablet, Sig Take 1 tablet by mouth every 8 hours as needed for Nausea or Vomiting, Taking? Yes, Authorizing Provider Ash Garber, DO    Medication aspirin 81 MG tablet, Sig Take 81 mg by mouth daily, Taking? Yes, Authorizing Provider Historical Provider, MD      Past Medical History:  05/25/2016: Abnormal MRI, kidney (atrophic, dysmoprhic, no discrete   mass)  July, 2015: JEAN (acute kidney injury) ([facility] [address]      Comment:  Nephrologist- Dr Esteban Summers  No date: Anemia  10/2019: Diabetic ulcer of right heel ([address]  9/30/2015: Gangrenous toe ([address]  7/12/2015: Hydronephrosis, right  2009: Necrotizing fasciitis ([address]      Comment:  left groin  No date: Pneumonia  No date: Pulmonary edema  No date: Renal calculi      Comment:  Rt staghorn--non obstr on Lt-as of 10/1  2015: Sepsis ([address]      Comment:  July - 2015 and August 2015  10/21/15: VRE (vancomycin-resistant Enterococci)      Comment:  urine        Review of Systems    Review of Systems   Constitutional: Negative for fever and unexpected weight change. HENT: Negative for congestion and postnasal drip. Eyes: Negative for visual disturbance. Respiratory: Negative for cough, chest tightness and shortness of breath. Cardiovascular: Negative for chest pain, palpitations and leg swelling. Gastrointestinal: Negative for abdominal pain, blood in stool and constipation. Genitourinary: Positive for frequency. Negative for dysuria and hematuria. Musculoskeletal: Positive for arthralgias and back pain. Negative for myalgias. Skin: Negative for rash. Neurological: Negative for tremors and headaches. Psychiatric/Behavioral: Negative for sleep disturbance. The patient is not nervous/anxious.         Objective:

## 2021-05-14 DIAGNOSIS — G89.4 CHRONIC PAIN SYNDROME: ICD-10-CM

## 2021-05-14 RX ORDER — OXYCODONE HYDROCHLORIDE 15 MG/1
15 TABLET ORAL EVERY 8 HOURS PRN
Qty: 90 TABLET | Refills: 0 | Status: SHIPPED | OUTPATIENT
Start: 2021-05-14 | End: 2021-06-13 | Stop reason: SDUPTHER

## 2021-05-14 NOTE — TELEPHONE ENCOUNTER
.  Last office visit 4/26/2021     Last written 4-14-21 90 with 0      Next office visit scheduled 7/26/2021    Requested Prescriptions     Pending Prescriptions Disp Refills    oxyCODONE (OXY-IR) 15 MG immediate release tablet 90 tablet 0     Sig: Take 1 tablet by mouth every 8 hours as needed for Pain for up to 30 days.

## 2021-06-13 DIAGNOSIS — G89.4 CHRONIC PAIN SYNDROME: ICD-10-CM

## 2021-06-14 NOTE — TELEPHONE ENCOUNTER
.  Last office visit 4/26/2021     Last written  5-14-21 90 with 0     Next office visit scheduled 7/26/2021    Requested Prescriptions     Pending Prescriptions Disp Refills    oxyCODONE (OXY-IR) 15 MG immediate release tablet 90 tablet 0     Sig: Take 1 tablet by mouth every 8 hours as needed for Pain for up to 30 days.

## 2021-06-15 RX ORDER — OXYCODONE HYDROCHLORIDE 15 MG/1
15 TABLET ORAL EVERY 8 HOURS PRN
Qty: 90 TABLET | Refills: 0 | Status: SHIPPED | OUTPATIENT
Start: 2021-06-15 | End: 2021-07-15 | Stop reason: SDUPTHER

## 2021-07-15 DIAGNOSIS — G89.4 CHRONIC PAIN SYNDROME: ICD-10-CM

## 2021-07-15 RX ORDER — ONDANSETRON HYDROCHLORIDE 8 MG/1
8 TABLET, FILM COATED ORAL EVERY 8 HOURS PRN
Qty: 30 TABLET | Refills: 1 | Status: SHIPPED | OUTPATIENT
Start: 2021-07-15 | End: 2021-10-22 | Stop reason: SDUPTHER

## 2021-07-15 RX ORDER — OXYCODONE HYDROCHLORIDE 15 MG/1
15 TABLET ORAL EVERY 8 HOURS PRN
Qty: 90 TABLET | Refills: 0 | Status: SHIPPED | OUTPATIENT
Start: 2021-07-15 | End: 2021-08-13 | Stop reason: SDUPTHER

## 2021-07-15 NOTE — TELEPHONE ENCOUNTER
.  Last office visit 4/26/2021     Last written 6-15-21 90 with 0      Next office visit scheduled 7/26/2021    Requested Prescriptions     Pending Prescriptions Disp Refills    ondansetron (ZOFRAN) 8 MG tablet 30 tablet 1     Sig: Take 1 tablet by mouth every 8 hours as needed for Nausea or Vomiting    oxyCODONE (OXY-IR) 15 MG immediate release tablet 90 tablet 0     Sig: Take 1 tablet by mouth every 8 hours as needed for Pain for up to 30 days.

## 2021-07-26 ENCOUNTER — OFFICE VISIT (OUTPATIENT)
Dept: FAMILY MEDICINE CLINIC | Age: 61
End: 2021-07-26
Payer: COMMERCIAL

## 2021-07-26 VITALS
BODY MASS INDEX: 52.56 KG/M2 | HEIGHT: 61 IN | HEART RATE: 78 BPM | DIASTOLIC BLOOD PRESSURE: 78 MMHG | OXYGEN SATURATION: 98 % | SYSTOLIC BLOOD PRESSURE: 125 MMHG | WEIGHT: 278.4 LBS

## 2021-07-26 DIAGNOSIS — E11.65 TYPE 2 DIABETES MELLITUS WITH HYPERGLYCEMIA, UNSPECIFIED WHETHER LONG TERM INSULIN USE (HCC): ICD-10-CM

## 2021-07-26 DIAGNOSIS — G89.4 CHRONIC PAIN SYNDROME: Primary | ICD-10-CM

## 2021-07-26 DIAGNOSIS — G25.0 TREMOR, ESSENTIAL: ICD-10-CM

## 2021-07-26 DIAGNOSIS — E66.01 MORBID OBESITY (HCC): ICD-10-CM

## 2021-07-26 DIAGNOSIS — I10 ESSENTIAL HYPERTENSION: ICD-10-CM

## 2021-07-26 LAB
BASOPHILS ABSOLUTE: 0 K/UL (ref 0–0.2)
BASOPHILS RELATIVE PERCENT: 0.5 %
EOSINOPHILS ABSOLUTE: 0.2 K/UL (ref 0–0.6)
EOSINOPHILS RELATIVE PERCENT: 2.6 %
HCT VFR BLD CALC: 47.4 % (ref 36–48)
HEMOGLOBIN: 16.1 G/DL (ref 12–16)
LYMPHOCYTES ABSOLUTE: 2.3 K/UL (ref 1–5.1)
LYMPHOCYTES RELATIVE PERCENT: 26.1 %
MCH RBC QN AUTO: 31.2 PG (ref 26–34)
MCHC RBC AUTO-ENTMCNC: 33.9 G/DL (ref 31–36)
MCV RBC AUTO: 92 FL (ref 80–100)
MONOCYTES ABSOLUTE: 0.5 K/UL (ref 0–1.3)
MONOCYTES RELATIVE PERCENT: 5.8 %
NEUTROPHILS ABSOLUTE: 5.7 K/UL (ref 1.7–7.7)
NEUTROPHILS RELATIVE PERCENT: 65 %
PDW BLD-RTO: 15.1 % (ref 12.4–15.4)
PLATELET # BLD: 240 K/UL (ref 135–450)
PMV BLD AUTO: 7.4 FL (ref 5–10.5)
RBC # BLD: 5.15 M/UL (ref 4–5.2)
WBC # BLD: 8.8 K/UL (ref 4–11)

## 2021-07-26 PROCEDURE — 3051F HG A1C>EQUAL 7.0%<8.0%: CPT | Performed by: FAMILY MEDICINE

## 2021-07-26 PROCEDURE — 4004F PT TOBACCO SCREEN RCVD TLK: CPT | Performed by: FAMILY MEDICINE

## 2021-07-26 PROCEDURE — G8427 DOCREV CUR MEDS BY ELIG CLIN: HCPCS | Performed by: FAMILY MEDICINE

## 2021-07-26 PROCEDURE — 90471 IMMUNIZATION ADMIN: CPT | Performed by: FAMILY MEDICINE

## 2021-07-26 PROCEDURE — 90670 PCV13 VACCINE IM: CPT | Performed by: FAMILY MEDICINE

## 2021-07-26 PROCEDURE — 99214 OFFICE O/P EST MOD 30 MIN: CPT | Performed by: FAMILY MEDICINE

## 2021-07-26 PROCEDURE — G8417 CALC BMI ABV UP PARAM F/U: HCPCS | Performed by: FAMILY MEDICINE

## 2021-07-26 PROCEDURE — 2022F DILAT RTA XM EVC RTNOPTHY: CPT | Performed by: FAMILY MEDICINE

## 2021-07-26 PROCEDURE — 3017F COLORECTAL CA SCREEN DOC REV: CPT | Performed by: FAMILY MEDICINE

## 2021-07-26 ASSESSMENT — ENCOUNTER SYMPTOMS
SHORTNESS OF BREATH: 1
BLOOD IN STOOL: 0
BACK PAIN: 1
COUGH: 0
ABDOMINAL PAIN: 0
CONSTIPATION: 0
CHEST TIGHTNESS: 0

## 2021-07-26 NOTE — PATIENT INSTRUCTIONS
Chronic pain syndrome  -     CBC Auto Differential  -     Comprehensive Metabolic Panel  -     Lipid Panel  Continue medication as needed and try to reduce when possible. Try and do some low back stretches twice a day and may use heating pad as needed but never at night. Type 2 diabetes mellitus with hyperglycemia, unspecified whether long term insulin use (HCC)  -     Cancel: POCT glycosylated hemoglobin (Hb A1C)  -     CBC Auto Differential  -     Comprehensive Metabolic Panel  -     Lipid Panel  -     Hemoglobin A1C  Lab test and A1c is pending until tomorrow morning. Continue medications and always be looking to drop a few pounds by reducing carbs and increasing activity. Essential hypertension  -     CBC Auto Differential  -     Comprehensive Metabolic Panel  -     Lipid Panel  Continue medications and no added salt diet-lab work today-limit caffeine and preferably no alcohol. Tremor, essential  Notify me if any increase in tremor to the point where you feel like that you need some medication. Morbid obesity (HCC)  Weight loss as mentioned above-decrease carbs and increase activity  Other orders  -     PREVNAR 13 IM (Pneumococcal conjugate vaccine 13-valent)    This is been approximately a 30-minute visit with the patient. Prior to and during the visit chart was reviewed for immunization-we will start Pneumovax today. Colonoscopy last one in 2019 and she had stool in her colon and was scheduled to come back in 1 year which has not been done yet. Mammogram was done 6 months ago. See me 3 months in the office and will do video in 6 months.         Ash Garbre, DO

## 2021-07-26 NOTE — PROGRESS NOTES
Subjective:      Patient ID: Lynnette Mckee is a 63 y.o. female. HPI  Patient in for checkup on several medical issues. Chronic pain syndrome-still has her chronic low back pain radiating into the right leg-no worse than usual and it is intermittent. Diabetes-last A1c 7.8 which is lower than the 8.4 that was the check before that and she is now on 3 mg of Trulicity for the last 2 months. Tremor. Not on any medications-she has a very mild tremor most of the time which is worse in the morning and when she is lifting things with her left hand but is not enough of a problem that she wants to take any medication-she is really not spilling anything. Obesity-she is 6 pounds down from January which is 6 months. She denies any other issues to discuss. Prior to Visit Medications :  Medication ondansetron (ZOFRAN) 8 MG tablet, Sig Take 1 tablet by mouth every 8 hours as needed for Nausea or Vomiting, Taking? Yes, Authorizing Provider Ash Garber, DO    Medication oxyCODONE (OXY-IR) 15 MG immediate release tablet, Sig Take 1 tablet by mouth every 8 hours as needed for Pain for up to 30 days. , Taking? Yes, Authorizing Provider Ash Garber, DO    Medication pravastatin (PRAVACHOL) 20 MG tablet, Sig TAKE ONE TABLET BY MOUTH DAILY, Taking? Yes, Authorizing Provider Ash Garber, DO    Medication Dulaglutide (TRULICITY) 3 XQ/0.2LW SOPN, Sig Inject 3 mg into the skin once a week, Taking? Yes, Authorizing Provider Ash Garber, DO    Medication chlorthalidone (HYGROTON) 25 MG tablet, Sig TAKE ONE TABLET BY MOUTH DAILY, Taking? Yes, Authorizing Provider Ash Garber, DO    Medication glipiZIDE (GLUCOTROL) 5 MG tablet, Sig TAKE 1 AND 1/2 TABLET BY MOUTH EVERY MORNING, Taking? Yes, Authorizing Provider Ash Garber, DO    Medication blood glucose test strips (ONE TOUCH ULTRA TEST) strip, Sig Fingerstick blood sugar twice a day, Taking?  Yes, Authorizing Provider Ash Garber, DO    Medication glucose monitoring kit (FREESTYLE) monitoring kit, Sig One Touch Ultra Meter Dx E11.9-on meds-no insulin, Taking? Yes, Authorizing Provider Ash Garber, DO    Medication aspirin 81 MG tablet, Sig Take 81 mg by mouth daily, Taking? Yes, Authorizing Provider Historical Provider, MD    Medication pregabalin (LYRICA) 150 MG capsule, Sig TAKE ONE CAPSULE BY MOUTH TWICE A DAY, Taking? , Authorizing Provider Maik Goodman, DO      Past Medical History:  05/25/2016: Abnormal MRI, kidney (atrophic, dysmoprhic, no discrete   mass)  July, 2015: JEAN (acute kidney injury) ([facility] [address]      Comment:  Nephrologist- Dr Anselmo Balbuena  No date: Anemia  10/2019: Diabetic ulcer of right heel ([address]  9/30/2015: Gangrenous toe ([address]  7/12/2015: Hydronephrosis, right  2009: Necrotizing fasciitis ([address]      Comment:  left groin  No date: Pneumonia  No date: Pulmonary edema  No date: Renal calculi      Comment:  Rt staghorn--non obstr on Lt-as of 10/1  2015: Sepsis ([address]      Comment:  July - 2015 and August 2015  10/21/15: VRE (vancomycin-resistant Enterococci)      Comment:  urine        Review of Systems    Review of Systems   Constitutional: Negative for fever and unexpected weight change. HENT: Negative for congestion and postnasal drip. Eyes: Negative for visual disturbance. Respiratory: Positive for shortness of breath. Negative for cough and chest tightness. Cardiovascular: Negative for chest pain, palpitations and leg swelling. Gastrointestinal: Negative for abdominal pain, blood in stool and constipation. Genitourinary: Negative for dysuria, frequency and hematuria. Musculoskeletal: Positive for arthralgias and back pain. Negative for myalgias. Skin: Negative for rash. Neurological: Positive for tremors. Negative for headaches. Psychiatric/Behavioral: Negative for sleep disturbance. The patient is not nervous/anxious.         Objective:   Physical Exam      Physical Exam  Constitutional:       General: She is not in acute distress. Appearance: Normal appearance. She is well-developed. She is obese. She is not ill-appearing. HENT:      Head: Normocephalic. Eyes:      General: No scleral icterus. Conjunctiva/sclera: Conjunctivae normal.   Neck:      Thyroid: No thyromegaly. Vascular: No carotid bruit. Cardiovascular:      Rate and Rhythm: Normal rate and regular rhythm. Heart sounds: Normal heart sounds. Comments: Stasis-venous insuff-trace edema  Pulmonary:      Effort: Pulmonary effort is normal.      Comments: Mild decrease BS's-essent clear  Abdominal:      General: Bowel sounds are normal. There is no distension. Palpations: Abdomen is soft. There is no mass. Tenderness: There is no abdominal tenderness. Musculoskeletal:      Cervical back: Neck supple. Comments: Tender,mild spasm,reduced rom L spine   Lymphadenopathy:      Cervical: No cervical adenopathy. Skin:     General: Skin is warm and dry. Coloration: Skin is not pale. Neurological:      Mental Status: She is alert and oriented to person, place, and time. Motor: No abnormal muscle tone. Gait: Gait abnormal.      Comments: On electric scooter in exam room-only uses outside home   Psychiatric:         Mood and Affect: Mood normal.         Behavior: Behavior normal.         Thought Content: Thought content normal.         Judgment: Judgment normal.         Assessment:       Diagnosis Orders   1. Chronic pain syndrome  CBC Auto Differential    Comprehensive Metabolic Panel    Lipid Panel   2. Type 2 diabetes mellitus with hyperglycemia, unspecified whether long term insulin use (HCC)  CBC Auto Differential    Comprehensive Metabolic Panel    Lipid Panel    Hemoglobin A1C   3. Essential hypertension  CBC Auto Differential    Comprehensive Metabolic Panel    Lipid Panel   4. Tremor, essential     5. Morbid obesity ([address]           Plan:      Mike Stack was seen today for 3 month follow-up.     Diagnoses and all orders for this visit:    Chronic pain syndrome  -     CBC Auto Differential  -     Comprehensive Metabolic Panel  -     Lipid Panel  Continue medication as needed and try to reduce when possible. Try and do some low back stretches twice a day and may use heating pad as needed but never at night. Type 2 diabetes mellitus with hyperglycemia, unspecified whether long term insulin use (HCC)  -     Cancel: POCT glycosylated hemoglobin (Hb A1C)  -     CBC Auto Differential  -     Comprehensive Metabolic Panel  -     Lipid Panel  -     Hemoglobin A1C  Lab test and A1c is pending until tomorrow morning. Continue medications and always be looking to drop a few pounds by reducing carbs and increasing activity. Essential hypertension  -     CBC Auto Differential  -     Comprehensive Metabolic Panel  -     Lipid Panel  Continue medications and no added salt diet-lab work today-limit caffeine and preferably no alcohol. Tremor, essential  Notify me if any increase in tremor to the point where you feel like that you need some medication. Morbid obesity (HCC)  Weight loss as mentioned above-decrease carbs and increase activity  Other orders  -     PREVNAR 13 IM (Pneumococcal conjugate vaccine 13-valent)    This is been approximately a 30-minute visit with the patient. Prior to and during the visit chart was reviewed for immunization-we will start Pneumovax today. Colonoscopy last one in 2019 and she had stool in her colon and was scheduled to come back in 1 year which has not been done yet. Mammogram was done 6 months ago. See me 3 months in the office and will do video in 6 months.         Ash Garber, DO

## 2021-07-27 LAB
A/G RATIO: 1.1 (ref 1.1–2.2)
ALBUMIN SERPL-MCNC: 3.8 G/DL (ref 3.4–5)
ALP BLD-CCNC: 123 U/L (ref 40–129)
ALT SERPL-CCNC: 29 U/L (ref 10–40)
ANION GAP SERPL CALCULATED.3IONS-SCNC: 13 MMOL/L (ref 3–16)
AST SERPL-CCNC: 21 U/L (ref 15–37)
BILIRUB SERPL-MCNC: <0.2 MG/DL (ref 0–1)
BUN BLDV-MCNC: 18 MG/DL (ref 7–20)
CALCIUM SERPL-MCNC: 9 MG/DL (ref 8.3–10.6)
CHLORIDE BLD-SCNC: 103 MMOL/L (ref 99–110)
CHOLESTEROL, TOTAL: 131 MG/DL (ref 0–199)
CO2: 26 MMOL/L (ref 21–32)
CREAT SERPL-MCNC: 1.8 MG/DL (ref 0.6–1.2)
ESTIMATED AVERAGE GLUCOSE: 168.6 MG/DL
GFR AFRICAN AMERICAN: 35
GFR NON-AFRICAN AMERICAN: 29
GLOBULIN: 3.5 G/DL
GLUCOSE BLD-MCNC: 171 MG/DL (ref 70–99)
HBA1C MFR BLD: 7.5 %
HDLC SERPL-MCNC: 32 MG/DL (ref 40–60)
LDL CHOLESTEROL CALCULATED: 66 MG/DL
POTASSIUM SERPL-SCNC: 4.4 MMOL/L (ref 3.5–5.1)
SODIUM BLD-SCNC: 142 MMOL/L (ref 136–145)
TOTAL PROTEIN: 7.3 G/DL (ref 6.4–8.2)
TRIGL SERPL-MCNC: 164 MG/DL (ref 0–150)
VLDLC SERPL CALC-MCNC: 33 MG/DL

## 2021-08-13 DIAGNOSIS — G89.4 CHRONIC PAIN SYNDROME: ICD-10-CM

## 2021-08-13 RX ORDER — OXYCODONE HYDROCHLORIDE 15 MG/1
15 TABLET ORAL EVERY 8 HOURS PRN
Qty: 90 TABLET | Refills: 0 | Status: SHIPPED | OUTPATIENT
Start: 2021-08-13 | End: 2021-09-12 | Stop reason: SDUPTHER

## 2021-08-13 NOTE — TELEPHONE ENCOUNTER
Refill Request - Controlled Substance    Last Seen Department: 7/26/2021  Last Seen by PCP: 7/26/2021    Last Written: 7/15/2021    Last UDS: 6/15/2018    Med Agreement Signed On: 4/17/2019    Next Appointment: 10/18/2021    Future appointment scheduled    Requested Prescriptions     Pending Prescriptions Disp Refills    oxyCODONE (OXY-IR) 15 MG immediate release tablet 90 tablet 0     Sig: Take 1 tablet by mouth every 8 hours as needed for Pain for up to 30 days.

## 2021-09-12 DIAGNOSIS — G89.4 CHRONIC PAIN SYNDROME: ICD-10-CM

## 2021-09-13 RX ORDER — OXYCODONE HYDROCHLORIDE 15 MG/1
15 TABLET ORAL EVERY 8 HOURS PRN
Qty: 90 TABLET | Refills: 0 | Status: SHIPPED | OUTPATIENT
Start: 2021-09-13 | End: 2021-10-12 | Stop reason: SDUPTHER

## 2021-09-13 NOTE — TELEPHONE ENCOUNTER
6/15/18last uds  4/17/19 med contract   Last office visit 7/26/2021     Last written 8/13/21 90 with 0      Next office visit scheduled 10/18/2021    Requested Prescriptions     Pending Prescriptions Disp Refills    oxyCODONE (OXY-IR) 15 MG immediate release tablet 90 tablet 0     Sig: Take 1 tablet by mouth every 8 hours as needed for Pain for up to 30 days.

## 2021-09-24 RX ORDER — GLIPIZIDE 5 MG/1
TABLET ORAL
Qty: 45 TABLET | Refills: 1 | Status: SHIPPED | OUTPATIENT
Start: 2021-09-24 | End: 2021-10-22

## 2021-09-24 RX ORDER — CHLORTHALIDONE 25 MG/1
TABLET ORAL
Qty: 30 TABLET | Refills: 1 | Status: SHIPPED | OUTPATIENT
Start: 2021-09-24 | End: 2021-11-30 | Stop reason: SDUPTHER

## 2021-10-12 DIAGNOSIS — G89.4 CHRONIC PAIN SYNDROME: ICD-10-CM

## 2021-10-13 NOTE — TELEPHONE ENCOUNTER
Refill Request - Controlled Substance    Last Seen Department: 7/26/2021  Last Seen by PCP: Visit date not found    Last Written: 9/13/2021    Last UDS: 6/15/2018    Med Agreement Signed On: 4/5/2019    Next Appointment: 10/22/2021      Requested Prescriptions     Pending Prescriptions Disp Refills    oxyCODONE (OXY-IR) 15 MG immediate release tablet 90 tablet 0     Sig: Take 1 tablet by mouth every 8 hours as needed for Pain for up to 30 days.

## 2021-10-14 RX ORDER — OXYCODONE HYDROCHLORIDE 15 MG/1
15 TABLET ORAL EVERY 8 HOURS PRN
Qty: 90 TABLET | Refills: 0 | Status: SHIPPED | OUTPATIENT
Start: 2021-10-14 | End: 2021-11-12 | Stop reason: SDUPTHER

## 2021-10-22 ENCOUNTER — OFFICE VISIT (OUTPATIENT)
Dept: FAMILY MEDICINE CLINIC | Age: 61
End: 2021-10-22
Payer: COMMERCIAL

## 2021-10-22 ENCOUNTER — TELEPHONE (OUTPATIENT)
Dept: FAMILY MEDICINE CLINIC | Age: 61
End: 2021-10-22

## 2021-10-22 VITALS
SYSTOLIC BLOOD PRESSURE: 130 MMHG | HEART RATE: 83 BPM | DIASTOLIC BLOOD PRESSURE: 88 MMHG | BODY MASS INDEX: 51.77 KG/M2 | WEIGHT: 274 LBS | OXYGEN SATURATION: 98 %

## 2021-10-22 DIAGNOSIS — E11.42 TYPE 2 DIABETES MELLITUS WITH DIABETIC POLYNEUROPATHY, WITHOUT LONG-TERM CURRENT USE OF INSULIN (HCC): Primary | ICD-10-CM

## 2021-10-22 DIAGNOSIS — Z23 NEED FOR SHINGLES VACCINE: ICD-10-CM

## 2021-10-22 DIAGNOSIS — E66.01 MORBID OBESITY (HCC): ICD-10-CM

## 2021-10-22 DIAGNOSIS — G89.4 CHRONIC PAIN SYNDROME: ICD-10-CM

## 2021-10-22 DIAGNOSIS — Z02.89 PAIN MEDICATION AGREEMENT SIGNED: ICD-10-CM

## 2021-10-22 DIAGNOSIS — R60.0 BILATERAL LOWER EXTREMITY EDEMA: ICD-10-CM

## 2021-10-22 DIAGNOSIS — I10 ESSENTIAL HYPERTENSION: ICD-10-CM

## 2021-10-22 DIAGNOSIS — Z23 NEED FOR INFLUENZA VACCINATION: ICD-10-CM

## 2021-10-22 DIAGNOSIS — E78.49 OTHER HYPERLIPIDEMIA: ICD-10-CM

## 2021-10-22 DIAGNOSIS — M51.26 LUMBAR HERNIATED DISC: ICD-10-CM

## 2021-10-22 DIAGNOSIS — N18.31 CKD STAGE G3A/A1, GFR 45-59 AND ALBUMIN CREATININE RATIO <30 MG/G (HCC): ICD-10-CM

## 2021-10-22 DIAGNOSIS — Z23 NEED FOR PNEUMOCOCCAL VACCINATION: ICD-10-CM

## 2021-10-22 LAB
ALBUMIN SERPL-MCNC: 4.1 G/DL (ref 3.4–5)
ANION GAP SERPL CALCULATED.3IONS-SCNC: 15 MMOL/L (ref 3–16)
BUN BLDV-MCNC: 17 MG/DL (ref 7–20)
CALCIUM SERPL-MCNC: 9.5 MG/DL (ref 8.3–10.6)
CHLORIDE BLD-SCNC: 104 MMOL/L (ref 99–110)
CO2: 23 MMOL/L (ref 21–32)
CREAT SERPL-MCNC: 1.4 MG/DL (ref 0.6–1.2)
CREATININE URINE POCT: 200
GFR AFRICAN AMERICAN: 46
GFR NON-AFRICAN AMERICAN: 38
GLUCOSE BLD-MCNC: 182 MG/DL (ref 70–99)
HBA1C MFR BLD: 8 %
MICROALBUMIN/CREAT 24H UR: 150 MG/G{CREAT}
MICROALBUMIN/CREAT UR-RTO: ABNORMAL
PHOSPHORUS: 3.3 MG/DL (ref 2.5–4.9)
POTASSIUM SERPL-SCNC: 4.2 MMOL/L (ref 3.5–5.1)
SODIUM BLD-SCNC: 142 MMOL/L (ref 136–145)

## 2021-10-22 PROCEDURE — 90472 IMMUNIZATION ADMIN EACH ADD: CPT | Performed by: PHYSICIAN ASSISTANT

## 2021-10-22 PROCEDURE — 99214 OFFICE O/P EST MOD 30 MIN: CPT | Performed by: PHYSICIAN ASSISTANT

## 2021-10-22 PROCEDURE — 90732 PPSV23 VACC 2 YRS+ SUBQ/IM: CPT | Performed by: PHYSICIAN ASSISTANT

## 2021-10-22 PROCEDURE — G8417 CALC BMI ABV UP PARAM F/U: HCPCS | Performed by: PHYSICIAN ASSISTANT

## 2021-10-22 PROCEDURE — 4004F PT TOBACCO SCREEN RCVD TLK: CPT | Performed by: PHYSICIAN ASSISTANT

## 2021-10-22 PROCEDURE — 3017F COLORECTAL CA SCREEN DOC REV: CPT | Performed by: PHYSICIAN ASSISTANT

## 2021-10-22 PROCEDURE — 82044 UR ALBUMIN SEMIQUANTITATIVE: CPT | Performed by: PHYSICIAN ASSISTANT

## 2021-10-22 PROCEDURE — G8484 FLU IMMUNIZE NO ADMIN: HCPCS | Performed by: PHYSICIAN ASSISTANT

## 2021-10-22 PROCEDURE — 90750 HZV VACC RECOMBINANT IM: CPT | Performed by: PHYSICIAN ASSISTANT

## 2021-10-22 PROCEDURE — 83036 HEMOGLOBIN GLYCOSYLATED A1C: CPT | Performed by: PHYSICIAN ASSISTANT

## 2021-10-22 PROCEDURE — G8427 DOCREV CUR MEDS BY ELIG CLIN: HCPCS | Performed by: PHYSICIAN ASSISTANT

## 2021-10-22 PROCEDURE — 2022F DILAT RTA XM EVC RTNOPTHY: CPT | Performed by: PHYSICIAN ASSISTANT

## 2021-10-22 PROCEDURE — 90471 IMMUNIZATION ADMIN: CPT | Performed by: PHYSICIAN ASSISTANT

## 2021-10-22 PROCEDURE — 3052F HG A1C>EQUAL 8.0%<EQUAL 9.0%: CPT | Performed by: PHYSICIAN ASSISTANT

## 2021-10-22 RX ORDER — ONDANSETRON HYDROCHLORIDE 8 MG/1
8 TABLET, FILM COATED ORAL EVERY 8 HOURS PRN
Qty: 30 TABLET | Refills: 1 | Status: SHIPPED | OUTPATIENT
Start: 2021-10-22 | End: 2021-12-21 | Stop reason: SDUPTHER

## 2021-10-22 RX ORDER — DULAGLUTIDE 3 MG/.5ML
INJECTION, SOLUTION SUBCUTANEOUS
Qty: 4 PEN | Refills: 2 | Status: SHIPPED | OUTPATIENT
Start: 2021-10-22 | End: 2022-01-17 | Stop reason: SDUPTHER

## 2021-10-22 RX ORDER — GLIPIZIDE 10 MG/1
10 TABLET, FILM COATED, EXTENDED RELEASE ORAL DAILY
Qty: 90 TABLET | Refills: 1 | Status: SHIPPED | OUTPATIENT
Start: 2021-10-22 | End: 2022-04-25

## 2021-10-22 RX ORDER — PRAVASTATIN SODIUM 20 MG
TABLET ORAL
Qty: 90 TABLET | Refills: 1 | Status: SHIPPED | OUTPATIENT
Start: 2021-10-22 | End: 2022-04-25

## 2021-10-22 SDOH — ECONOMIC STABILITY: FOOD INSECURITY: WITHIN THE PAST 12 MONTHS, YOU WORRIED THAT YOUR FOOD WOULD RUN OUT BEFORE YOU GOT MONEY TO BUY MORE.: NEVER TRUE

## 2021-10-22 SDOH — ECONOMIC STABILITY: FOOD INSECURITY: WITHIN THE PAST 12 MONTHS, THE FOOD YOU BOUGHT JUST DIDN'T LAST AND YOU DIDN'T HAVE MONEY TO GET MORE.: NEVER TRUE

## 2021-10-22 ASSESSMENT — ENCOUNTER SYMPTOMS
WHEEZING: 0
SHORTNESS OF BREATH: 0
BACK PAIN: 1
COLOR CHANGE: 1

## 2021-10-22 ASSESSMENT — SOCIAL DETERMINANTS OF HEALTH (SDOH): HOW HARD IS IT FOR YOU TO PAY FOR THE VERY BASICS LIKE FOOD, HOUSING, MEDICAL CARE, AND HEATING?: NOT HARD AT ALL

## 2021-10-22 NOTE — PROGRESS NOTES
1yo)      No follow-ups on file. Subjective   SUBJECTIVE/OBJECTIVE:  HPI  DM:  Current medication: glipiizde, trulicity  Side effects: none  Home blood sugars: 110-130  Signs or symptoms of hyperglycemia or hypoglycemia  Complications: neuropathy-currently on lyrica; CKD, and peripheral vascular disease  Diabetic eye exam: due, concerned about the pressures in his eyes    HTN:  Current medication: chlorthalidone  Side effects: none  Home blood pressure: 130/80  Denies any current chest pain, shortness of breath or headache    HLD:  Current medication: pravachol  Side effects:  Diet: smaller portions, drinks water primarily, mostly sugar free soda  Exercise: walks at home with a cane, unable to ambulate for extended periods of time due to back pain    Back pain:  Current medication: Oxycodone  Denies any side effects to medication  Reports that she needs her handicap placard    Has had a parital hysterectomy  Review of Systems   Constitutional: Negative for fatigue and unexpected weight change. Eyes: Negative for visual disturbance. Respiratory: Negative for shortness of breath and wheezing. Cardiovascular: Positive for leg swelling. Negative for chest pain and palpitations. Endocrine: Negative for polydipsia, polyphagia and polyuria. Musculoskeletal: Positive for back pain, gait problem and myalgias. Negative for joint swelling. Skin: Positive for color change. Negative for pallor. Neurological: Positive for numbness. Negative for weakness. Objective   Physical Exam  Vitals reviewed. Constitutional:       Appearance: Normal appearance. She is obese. Cardiovascular:      Rate and Rhythm: Normal rate and regular rhythm. Heart sounds: Normal heart sounds. Pulmonary:      Effort: Pulmonary effort is normal.      Breath sounds: Normal breath sounds. Abdominal:      General: Bowel sounds are normal.      Palpations: Abdomen is soft.    Neurological:      Mental Status: She is alert and oriented to person, place, and time. Cranial Nerves: No cranial nerve deficit. Visual inspection:  Deformity/amputation: absent  Skin lesions/pre-ulcerative calluses: absent  Edema: right- 1+, left- 1+    Sensory exam:  Monofilament sensation: normal  (minimum of 5 random plantar locations tested, avoiding callused areas - > 1 area with absence of sensation is + for neuropathy)    Plus at least one of the following:  Pulses: normal    An electronic signature was used to authenticate this note.     --LESLIE Villaseñor

## 2021-10-29 DIAGNOSIS — G89.29 OTHER CHRONIC PAIN: ICD-10-CM

## 2021-10-29 RX ORDER — PREGABALIN 150 MG/1
CAPSULE ORAL
Qty: 60 CAPSULE | Refills: 0 | Status: SHIPPED | OUTPATIENT
Start: 2021-10-29 | End: 2021-11-30 | Stop reason: SDUPTHER

## 2021-10-29 NOTE — TELEPHONE ENCOUNTER
.  Refill Request     Last Seen: Last Seen Department: 10/22/2021  Last Seen by PCP: 7/26/2021    Last Written: 4-26-21 60 with 5     Next Appointment:   Future Appointments   Date Time Provider Dora Licona   1/31/2022  1:00 PM DO NINA Masterson [place] [facility]       Future appointment scheduled      Requested Prescriptions     Pending Prescriptions Disp Refills    pregabalin (LYRICA) 150 MG capsule [Pharmacy Med Name: PREGABALIN 150 MG CAPSULE] 60 capsule 0     Sig: TAKE ONE CAPSULE BY MOUTH TWICE A DAY

## 2021-11-04 LAB
6-ACETYLMORPHINE: NOT DETECTED
7-AMINOCLONAZEPAM: NOT DETECTED
ALPHA-OH-ALPRAZOLAM: NOT DETECTED
ALPHA-OH-MIDAZOLAM, URINE: NOT DETECTED
ALPRAZOLAM: NOT DETECTED
AMPHETAMINE: NOT DETECTED
BARBITURATES: NOT DETECTED
BENZOYLECGONINE: NOT DETECTED
BUPRENORPHINE: NOT DETECTED
CARISOPRODOL: NOT DETECTED
CLONAZEPAM: NOT DETECTED
CODEINE: NOT DETECTED
CREATININE URINE: 161.9 MG/DL (ref 20–400)
DIAZEPAM: NOT DETECTED
DRUGS EXPECTED: NORMAL
EER PAIN MGT DRUG PANEL, HIGH RES/EMIT U: NORMAL
ETHYL GLUCURONIDE: NOT DETECTED
FENTANYL: NOT DETECTED
GABAPENTIN: NOT DETECTED
HYDROCODONE: NOT DETECTED
HYDROMORPHONE: NOT DETECTED
LORAZEPAM: NOT DETECTED
MARIJUANA METABOLITE: NOT DETECTED
MDA: NOT DETECTED
MDEA: NOT DETECTED
MDMA URINE: NOT DETECTED
MEPERIDINE: NOT DETECTED
METHADONE: NOT DETECTED
METHAMPHETAMINE: NOT DETECTED
METHYLPHENIDATE: NOT DETECTED
MIDAZOLAM: NOT DETECTED
MORPHINE: NOT DETECTED
NALOXONE: NOT DETECTED
NORBUPRENORPHINE, FREE: NOT DETECTED
NORDIAZEPAM: NOT DETECTED
NORFENTANYL: NOT DETECTED
NORHYDROCODONE, URINE: NOT DETECTED
NOROXYCODONE: PRESENT
NOROXYMORPHONE, URINE: PRESENT
OXAZEPAM: NOT DETECTED
OXYCODONE: PRESENT
OXYMORPHONE: PRESENT
PAIN MANAGEMENT DRUG PANEL: NORMAL
PAIN MANAGEMENT DRUG PANEL: NORMAL
PCP: NOT DETECTED
PHENTERMINE: NOT DETECTED
PREGABALIN: NOT DETECTED
TAPENTADOL, URINE: NOT DETECTED
TAPENTADOL-O-SULFATE, URINE: NOT DETECTED
TEMAZEPAM: NOT DETECTED
TRAMADOL: NOT DETECTED
ZOLPIDEM: NOT DETECTED

## 2021-11-30 DIAGNOSIS — G89.29 OTHER CHRONIC PAIN: ICD-10-CM

## 2021-11-30 RX ORDER — PREGABALIN 150 MG/1
CAPSULE ORAL
Qty: 60 CAPSULE | Refills: 0 | Status: SHIPPED | OUTPATIENT
Start: 2021-11-30 | End: 2021-12-26 | Stop reason: SDUPTHER

## 2021-11-30 RX ORDER — CHLORTHALIDONE 25 MG/1
TABLET ORAL
Qty: 30 TABLET | Refills: 1 | Status: SHIPPED | OUTPATIENT
Start: 2021-11-30 | End: 2022-01-17 | Stop reason: SDUPTHER

## 2021-11-30 NOTE — TELEPHONE ENCOUNTER
.  Refill Request     Last Seen: Last Seen Department: 10/22/2021  Last Seen by PCP: 7/26/2021    Last Written: 10-29-21 60 with 0   Chlorthalidone 9-24-21 30 with 1    Last UDS 10-22-21  No med contract     Next Appointment:   Future Appointments   Date Time Provider Dora Licona   1/31/2022  1:00 PM DO NINA Masterson  [place] [facility]       Future appointment scheduled      Requested Prescriptions     Pending Prescriptions Disp Refills    chlorthalidone (HYGROTON) 25 MG tablet 30 tablet 1     Sig: Take one tab by mouth daily    pregabalin (LYRICA) 150 MG capsule 60 capsule 0     Sig: Take one capsule by mouth twice a day

## 2021-12-21 DIAGNOSIS — E11.42 TYPE 2 DIABETES MELLITUS WITH DIABETIC POLYNEUROPATHY, WITHOUT LONG-TERM CURRENT USE OF INSULIN (HCC): ICD-10-CM

## 2021-12-21 RX ORDER — ONDANSETRON HYDROCHLORIDE 8 MG/1
8 TABLET, FILM COATED ORAL EVERY 8 HOURS PRN
Qty: 30 TABLET | Refills: 1 | Status: SHIPPED | OUTPATIENT
Start: 2021-12-21 | End: 2022-03-18 | Stop reason: SDUPTHER

## 2021-12-21 NOTE — TELEPHONE ENCOUNTER
Refill Request     Last Seen: Last Seen Department: 10/22/2021  Last Seen by PCP: 10/22/2021    Last Written: 10/22/21 30 tablet 1 refill     Next Appointment: 1/31/22     Future Appointments   Date Time Provider Dora Licona   1/31/2022  1:00 PM DO NINA Masterson [place] [facility]       Future appointment scheduled      Requested Prescriptions     Pending Prescriptions Disp Refills    ondansetron (ZOFRAN) 8 MG tablet 30 tablet 1     Sig: Take 1 tablet by mouth every 8 hours as needed for Nausea or Vomiting

## 2022-01-13 DIAGNOSIS — G89.4 CHRONIC PAIN SYNDROME: ICD-10-CM

## 2022-01-14 RX ORDER — OXYCODONE HYDROCHLORIDE 15 MG/1
15 TABLET ORAL EVERY 8 HOURS PRN
Qty: 90 TABLET | Refills: 0 | Status: SHIPPED | OUTPATIENT
Start: 2022-01-14 | End: 2022-02-13 | Stop reason: SDUPTHER

## 2022-01-14 NOTE — TELEPHONE ENCOUNTER
Refill Request - Controlled Substance    Last Seen Department: 10/22/2021  Last Seen by PCP: Visit date not found    Last Written: 12/15/2021    Last UDS: 10/22/2021    Med Agreement Signed On: 4/17/2019    Next Appointment: Visit date not found     Message to Grows Up to schedule appointment. 3 months (around 1/22/2022) for pain medication. Requested Prescriptions     Pending Prescriptions Disp Refills    oxyCODONE (OXY-IR) 15 MG immediate release tablet 90 tablet 0     Sig: Take 1 tablet by mouth every 8 hours as needed for Pain for up to 30 days.

## 2022-01-17 ENCOUNTER — VIRTUAL VISIT (OUTPATIENT)
Dept: FAMILY MEDICINE CLINIC | Age: 62
End: 2022-01-17
Payer: COMMERCIAL

## 2022-01-17 DIAGNOSIS — I10 ESSENTIAL HYPERTENSION: Primary | ICD-10-CM

## 2022-01-17 DIAGNOSIS — E11.42 TYPE 2 DIABETES MELLITUS WITH DIABETIC POLYNEUROPATHY, WITHOUT LONG-TERM CURRENT USE OF INSULIN (HCC): ICD-10-CM

## 2022-01-17 DIAGNOSIS — G89.4 CHRONIC PAIN SYNDROME: ICD-10-CM

## 2022-01-17 PROCEDURE — 3017F COLORECTAL CA SCREEN DOC REV: CPT | Performed by: NURSE PRACTITIONER

## 2022-01-17 PROCEDURE — 3046F HEMOGLOBIN A1C LEVEL >9.0%: CPT | Performed by: NURSE PRACTITIONER

## 2022-01-17 PROCEDURE — G8484 FLU IMMUNIZE NO ADMIN: HCPCS | Performed by: NURSE PRACTITIONER

## 2022-01-17 PROCEDURE — G8427 DOCREV CUR MEDS BY ELIG CLIN: HCPCS | Performed by: NURSE PRACTITIONER

## 2022-01-17 PROCEDURE — 2022F DILAT RTA XM EVC RTNOPTHY: CPT | Performed by: NURSE PRACTITIONER

## 2022-01-17 PROCEDURE — G8417 CALC BMI ABV UP PARAM F/U: HCPCS | Performed by: NURSE PRACTITIONER

## 2022-01-17 PROCEDURE — 99213 OFFICE O/P EST LOW 20 MIN: CPT | Performed by: NURSE PRACTITIONER

## 2022-01-17 PROCEDURE — 4004F PT TOBACCO SCREEN RCVD TLK: CPT | Performed by: NURSE PRACTITIONER

## 2022-01-17 RX ORDER — DULAGLUTIDE 3 MG/.5ML
INJECTION, SOLUTION SUBCUTANEOUS
Qty: 4 PEN | Refills: 5 | Status: SHIPPED | OUTPATIENT
Start: 2022-01-17 | End: 2022-09-12 | Stop reason: SDUPTHER

## 2022-01-17 RX ORDER — CHLORTHALIDONE 25 MG/1
TABLET ORAL
Qty: 90 TABLET | Refills: 3 | Status: SHIPPED | OUTPATIENT
Start: 2022-01-17

## 2022-01-17 NOTE — PROGRESS NOTES
Gutierrez Tinajero (:  1960) is a 57 y.o. female,Established patient, here for evaluation of the following chief complaint(s): Chronic Pain (pain medication 3 months)         ASSESSMENT/PLAN:  1. Essential hypertension  -     chlorthalidone (HYGROTON) 25 MG tablet; Take one tab by mouth daily, Disp-90 tablet, R-3Normal  2. Type 2 diabetes mellitus with diabetic polyneuropathy, without long-term current use of insulin (HCC)  -     Dulaglutide (TRULICITY) 3 UU/7.0GM SOPN; INJECT 3 MG UNDER THE SKIN ONCE WEEKLY, Disp-4 pen, R-5Normal      Controlled Substance Monitoring:    Acute and Chronic Pain Monitoring:   RX Monitoring 2022   Attestation -   Acute Pain Prescriptions -   Periodic Controlled Substance Monitoring No signs of potential drug abuse or diversion identified. Chronic Pain > 120 MEDD Obtained or confirmed \"Medication Contract\" on file. Last HgA1c elevated at 8.0. States glipizide changed to XL version for improved coverage. Will recheck on next office visit. Return in about 3 months (around 2022) for chronic pain, diabetes. SUBJECTIVE/OBJECTIVE:  HPI     For routine follow up chronic pain. Feeling well, no new concerns. Feels BS is normal. Morning 136-140. After eating around 200. Review of Systems   All other systems reviewed and are negative. No flowsheet data found.      Physical Exam    [INSTRUCTIONS:  \"[x]\" Indicates a positive item  \"[]\" Indicates a negative item  -- DELETE ALL ITEMS NOT EXAMINED]    Constitutional: [x] Appears well-developed and well-nourished [x] No apparent distress      [] Abnormal -     Mental status: [x] Alert and awake  [x] Oriented to person/place/time [x] Able to follow commands    [] Abnormal -     Eyes:   EOM    [x]  Normal    [] Abnormal -   Sclera  [x]  Normal    [] Abnormal -          Discharge [x]  None visible   [] Abnormal -     HENT: [x] Normocephalic, atraumatic  [] Abnormal -   [x] Mouth/Throat: Mucous membranes are moist    External Ears [x] Normal  [] Abnormal -    Neck: [x] No visualized mass [] Abnormal -     Pulmonary/Chest: [x] Respiratory effort normal   [x] No visualized signs of difficulty breathing or respiratory distress        [] Abnormal -      Musculoskeletal:   [x] Normal gait with no signs of ataxia         [x] Normal range of motion of neck        [] Abnormal -     Neurological:        [x] No Facial Asymmetry (Cranial nerve 7 motor function) (limited exam due to video visit)          [x] No gaze palsy        [] Abnormal -          Skin:        [x] No significant exanthematous lesions or discoloration noted on facial skin         [] Abnormal -            Psychiatric:       [x] Normal Affect [] Abnormal -        [x] No Hallucinations    Other pertinent observable physical exam findings:-            Current Outpatient Medications   Medication Sig Dispense Refill    Dulaglutide (TRULICITY) 3 KY/6.9WN SOPN INJECT 3 MG UNDER THE SKIN ONCE WEEKLY 4 pen 5    chlorthalidone (HYGROTON) 25 MG tablet Take one tab by mouth daily 90 tablet 3    oxyCODONE (OXY-IR) 15 MG immediate release tablet Take 1 tablet by mouth every 8 hours as needed for Pain for up to 30 days. 90 tablet 0    pregabalin (LYRICA) 150 MG capsule Take one capsule by mouth twice a day 60 capsule 0    ondansetron (ZOFRAN) 8 MG tablet Take 1 tablet by mouth every 8 hours as needed for Nausea or Vomiting 30 tablet 1    glipiZIDE (GLUCOTROL XL) 10 MG extended release tablet Take 1 tablet by mouth daily 90 tablet 1    Handicap Placard MISC by Does not apply route I have evaluated this patient and Tamera Rod needs handicap placard for 5 years.  1 each 0    pravastatin (PRAVACHOL) 20 MG tablet TAKE ONE TABLET BY MOUTH DAILY 90 tablet 1    blood glucose test strips (ONE TOUCH ULTRA TEST) strip Fingerstick blood sugar twice a day 100 strip 5    glucose monitoring kit (FREESTYLE) monitoring kit One Touch Ultra Meter Dx E11.9-on meds-no insulin 1 kit 0    aspirin 81 MG tablet Take 81 mg by mouth daily       No current facility-administered medications for this visit. Bobby Hercules, was evaluated through a synchronous (real-time) audio-video encounter. The patient (or guardian if applicable) is aware that this is a billable service. Verbal consent to proceed has been obtained within the past 12 months. The visit was conducted pursuant to the emergency declaration under the [address] [place] [address], [address] [place] [address] authority and the [email or web address] General Act. Patient identification was verified, and a caregiver was present when appropriate. The patient was located in a state where the provider was credentialed to provide care.       An electronic signature was used to authenticate this note.    --ANIYA DE LA TORRE - CNP

## 2022-01-19 ENCOUNTER — TELEPHONE (OUTPATIENT)
Dept: FAMILY MEDICINE CLINIC | Age: 62
End: 2022-01-19

## 2022-01-20 ENCOUNTER — TELEPHONE (OUTPATIENT)
Dept: FAMILY MEDICINE CLINIC | Age: 62
End: 2022-01-20

## 2022-01-20 NOTE — TELEPHONE ENCOUNTER
----- Message from Aleshiajarad Clemente sent at 1/20/2022  2:34 PM EST -----  Subject: Message to Provider    QUESTIONS  Information for Provider? Patient called to return call from practice. Please contact patient as we've called twice but keep getting rerouted to   the [facility].  ---------------------------------------------------------------------------  --------------  CALL BACK INFO  What is the best way for the office to contact you? OK to leave message on   voicemail  Preferred Call Back Phone Number? [phone]  ---------------------------------------------------------------------------  --------------  SCRIPT ANSWERS  Relationship to Patient?  Self

## 2022-01-20 NOTE — TELEPHONE ENCOUNTER
Call to Rx to check on med. Pt reports it may be too soon to fill  PA needed as noted so call to RX to see if they have all in order  Trulicity is ready to go   Pt alerted.

## 2022-01-20 NOTE — TELEPHONE ENCOUNTER
The patient has an approval on file for Trulicity already that's good through 10/25/2022. Is this for new insurance? If so I need that information.

## 2022-02-13 DIAGNOSIS — G89.4 CHRONIC PAIN SYNDROME: ICD-10-CM

## 2022-02-14 RX ORDER — BLOOD SUGAR DIAGNOSTIC
STRIP MISCELLANEOUS
Qty: 200 STRIP | Refills: 3 | Status: SHIPPED | OUTPATIENT
Start: 2022-02-14

## 2022-02-14 RX ORDER — OXYCODONE HYDROCHLORIDE 15 MG/1
15 TABLET ORAL EVERY 8 HOURS PRN
Qty: 90 TABLET | Refills: 0 | Status: SHIPPED | OUTPATIENT
Start: 2022-02-14 | End: 2022-03-15 | Stop reason: SDUPTHER

## 2022-02-14 NOTE — TELEPHONE ENCOUNTER
Refill Request     Last Seen: Last Seen Department: 1/17/2022  Last Seen by PCP: 7/26/2021    Last Written: 2/2/21    Next Appointment:   Future Appointments   Date Time Provider Dora Licona   4/19/2022  1:00 PM DO NINA Masterson [place] [facility]       Future appointment scheduled      Requested Prescriptions     Pending Prescriptions Disp Refills    blood glucose test strips (ONETOUCH ULTRA) strip [Pharmacy Med Name: Jameel Riley TEST STRIP] 50 strip      Sig: CHECK BLOOD SUGAR TWO TIMES A DAY

## 2022-02-14 NOTE — TELEPHONE ENCOUNTER
.  Refill Request - Controlled Substance    Last Seen Department: 1/17/2022  Last Seen by PCP: Visit date not found    Last Written: 1-14-22 90 with 0     Last UDS: 10-22-21    Med Agreement Signed On: 4-17-19    Next Appointment: 2/13/2022    Future appointment scheduled    Requested Prescriptions     Pending Prescriptions Disp Refills    oxyCODONE (OXY-IR) 15 MG immediate release tablet 90 tablet 0     Sig: Take 1 tablet by mouth every 8 hours as needed for Pain for up to 30 days.

## 2022-03-12 DIAGNOSIS — G89.4 CHRONIC PAIN SYNDROME: ICD-10-CM

## 2022-03-14 RX ORDER — OXYCODONE HYDROCHLORIDE 15 MG/1
15 TABLET ORAL EVERY 8 HOURS PRN
Qty: 90 TABLET | Refills: 0 | Status: CANCELLED | OUTPATIENT
Start: 2022-03-14 | End: 2022-04-13

## 2022-03-14 NOTE — TELEPHONE ENCOUNTER
Refill Request     Last Seen: Last Seen Department: 1/17/2022  Last Seen by PCP: 7/26/2021    Last Written: 2/14/2022    Next Appointment:   Future Appointments   Date Time Provider Dora Licona   4/19/2022  1:00 PM DO NINA Masterson [place] [facility]       Future appointment scheduled      Requested Prescriptions     Pending Prescriptions Disp Refills    oxyCODONE (OXY-IR) 15 MG immediate release tablet 90 tablet 0     Sig: Take 1 tablet by mouth every 8 hours as needed for Pain for up to 30 days.

## 2022-03-15 DIAGNOSIS — G89.4 CHRONIC PAIN SYNDROME: ICD-10-CM

## 2022-03-15 RX ORDER — OXYCODONE HYDROCHLORIDE 15 MG/1
15 TABLET ORAL EVERY 8 HOURS PRN
Qty: 85 TABLET | Refills: 0 | Status: SHIPPED | OUTPATIENT
Start: 2022-03-15 | End: 2022-04-14 | Stop reason: SDUPTHER

## 2022-03-18 DIAGNOSIS — E11.42 TYPE 2 DIABETES MELLITUS WITH DIABETIC POLYNEUROPATHY, WITHOUT LONG-TERM CURRENT USE OF INSULIN (HCC): ICD-10-CM

## 2022-03-18 RX ORDER — ONDANSETRON HYDROCHLORIDE 8 MG/1
8 TABLET, FILM COATED ORAL EVERY 8 HOURS PRN
Qty: 90 TABLET | Refills: 1 | Status: SHIPPED | OUTPATIENT
Start: 2022-03-18 | End: 2022-06-16 | Stop reason: SDUPTHER

## 2022-03-18 NOTE — TELEPHONE ENCOUNTER
Refill Request     Last Seen: Last Seen Department: 1/17/2022  Last Seen by PCP: 7/26/21     Last Written: 12/21/21 30 tablet 1 refill     Next Appointment: 4/19/22     Future Appointments   Date Time Provider Dora Licona   4/19/2022  1:00 PM DO NINA Masterson [place] [facility]       Future appointment scheduled      Requested Prescriptions     Pending Prescriptions Disp Refills    ondansetron (ZOFRAN) 8 MG tablet 90 tablet 1     Sig: Take 1 tablet by mouth every 8 hours as needed for Nausea or Vomiting

## 2022-04-14 DIAGNOSIS — G89.4 CHRONIC PAIN SYNDROME: ICD-10-CM

## 2022-04-14 RX ORDER — OXYCODONE HYDROCHLORIDE 15 MG/1
15 TABLET ORAL EVERY 8 HOURS PRN
Qty: 80 TABLET | Refills: 0 | Status: SHIPPED | OUTPATIENT
Start: 2022-04-14 | End: 2022-05-17 | Stop reason: SDUPTHER

## 2022-04-24 DIAGNOSIS — E78.49 OTHER HYPERLIPIDEMIA: ICD-10-CM

## 2022-04-24 DIAGNOSIS — E11.42 TYPE 2 DIABETES MELLITUS WITH DIABETIC POLYNEUROPATHY, WITHOUT LONG-TERM CURRENT USE OF INSULIN (HCC): ICD-10-CM

## 2022-04-25 RX ORDER — PRAVASTATIN SODIUM 20 MG
TABLET ORAL
Qty: 90 TABLET | Refills: 0 | Status: SHIPPED | OUTPATIENT
Start: 2022-04-25

## 2022-04-25 RX ORDER — GLIPIZIDE 10 MG/1
TABLET, FILM COATED, EXTENDED RELEASE ORAL
Qty: 90 TABLET | Refills: 0 | Status: SHIPPED | OUTPATIENT
Start: 2022-04-25 | End: 2022-08-04 | Stop reason: SDUPTHER

## 2022-04-25 NOTE — TELEPHONE ENCOUNTER
Refill Request     Last Seen: Last Seen Department: 1/17/2022  Last Seen by PCP: 10/22/2021    Last Written: 10/22/21 with 1 refill     Next Appointment:   Future Appointments   Date Time Provider Dora Licona   5/2/2022  3:30 PM DO NINA Masterson  [place] [facility]       Future appointment scheduled      Requested Prescriptions     Pending Prescriptions Disp Refills    pravastatin (PRAVACHOL) 20 MG tablet [Pharmacy Med Name: PRAVASTATIN SODIUM 20 MG TAB] 30 tablet      Sig: TAKE ONE TABLET BY MOUTH DAILY    glipiZIDE (GLUCOTROL XL) 10 MG extended release tablet [Pharmacy Med Name: glipiZIDE XL 10 MG TABLET] 30 tablet      Sig: TAKE ONE TABLET BY MOUTH DAILY

## 2022-05-13 ENCOUNTER — TELEPHONE (OUTPATIENT)
Dept: FAMILY MEDICINE CLINIC | Age: 62
End: 2022-05-13

## 2022-05-13 ENCOUNTER — CARE COORDINATION (OUTPATIENT)
Dept: CARE COORDINATION | Age: 62
End: 2022-05-13

## 2022-05-13 NOTE — CARE COORDINATION
Ambulatory Care Coordination Note  5/13/2022  CM Risk Score: 7  Charlson 10 Year Mortality Risk Score: 100%     ACC: River Barber RN    Summary Note: [facility] was asked by PCP to outreach to patient d/t not being able to afford cost of prescriptions Trulicity. AC outreached and patient said she recently lost health insurance and unable to afford Trulicity which is over 1,000 dollars a month without insurance. [facility] was able to sign patient up over the phone for the prescription drug savings card for 25 dollars a month. This was emailed to husbands private e-mail address at the request of the patient. Patient concerned that Lyrica might be to expensive d/t no insurance coverage. [facility] was able to sign patient up for the 4 dollar copayment card with Lyrica. Patient has appointments coming up with  office and Medicare to see if her and  qualify. Patient agreed to continued follow up calls for DM education. Patient said she would prefer mychart communication regarding education. [facility] will review education and Blood sugar numbers next week. Patient had no other questions at this time and thanked [facility] for time. Plan:    F/U medicare/  appt  BS logs  Complete enrollment (SDOH, CCP, Med Review, COPD assessment)  F/U call 1 week    Ambulatory Care Coordination Assessment    Care Coordination Protocol  Referral from Primary Care Provider: No  Week 1 - Initial Assessment     Do you have all of your prescriptions and are they filled?: Yes  Barriers to medication adherence: None  Are you able to afford your medications?: With Assistance  Medication Assistance Program: Patient assistance with pharmaceutical company  How often do you have trouble taking your medications the way you have been told to take them?: I always take them as prescribed. Current Housing: Private Residence                 Suggested Interventions and Community Resources  Diabetes Education:  In Process (Comment: DM Know your numbers 5/13/22 SHAE)    Medication Assistance Program: In Process   Zone Management Tools: In Process         Set up/Review an Education Plan, Set up/Review Goals              Prior to Admission medications    Medication Sig Start Date End Date Taking? Authorizing Provider   pravastatin (PRAVACHOL) 20 MG tablet TAKE ONE TABLET BY MOUTH DAILY 4/25/22   Ash Garber DO   glipiZIDE (GLUCOTROL XL) 10 MG extended release tablet TAKE ONE TABLET BY MOUTH DAILY 4/25/22   Ash Garber DO   oxyCODONE (OXY-IR) 15 MG immediate release tablet Take 1 tablet by mouth every 8 hours as needed for Pain for up to 30 days. MUST LAST 1 MO 4/14/22 5/14/22  Ash Garber DO   ondansetron (ZOFRAN) 8 MG tablet Take 1 tablet by mouth every 8 hours as needed for Nausea or Vomiting 3/18/22   Ash Garber, DO   blood glucose test strips (ONETOUCH ULTRA) strip CHECK BLOOD SUGAR TWO TIMES A DAY 2/14/22   Ash Garber DO   pregabalin (LYRICA) 150 MG capsule TAKE ONE CAPSULE BY MOUTH TWICE A DAY 1/31/22 4/30/22  Ash Garber DO   Dulaglutide (TRULICITY) 3 VH/2.5OE SOPN INJECT 3 MG UNDER THE SKIN ONCE WEEKLY 1/17/22   Curtistine ANIYA Shepherd - CNP   chlorthalidone (HYGROTON) 25 MG tablet Take one tab by mouth daily 1/17/22   Curtistine Cora APRN - CNP   Handicap Placard MISC by Does not apply route I have evaluated this patient and Romelia Sultana needs handicap placard for 5 years. 10/22/21   LESLIE Siegel   glucose monitoring kit (FREESTYLE) monitoring kit One Touch Ultra Meter Dx E11.9-on meds-no insulin 1/25/21   Ash Garber DO   aspirin 81 MG tablet Take 81 mg by mouth daily    Historical Provider, MD       No future appointments.   ,   Diabetes Assessment    Meal Planning: Avoidance of concentrated sweets, Calorie counting   How often do you test your blood sugar?: Daily       No patient-reported symptoms       and   General Assessment    Do you have any symptoms that are causing concern?: No

## 2022-05-16 DIAGNOSIS — G89.29 OTHER CHRONIC PAIN: ICD-10-CM

## 2022-05-16 DIAGNOSIS — G89.4 CHRONIC PAIN SYNDROME: ICD-10-CM

## 2022-05-16 NOTE — TELEPHONE ENCOUNTER
Refill Request - Controlled Substance    CONFIRM preferrred pharmacy with the patient. Last Seen Department: 2022    Last Seen by PCP: 2021    Last Written: Nomi Failin22 60 capsule 3 refills                         OXYCODONE: 7/15/21 90 TABLET 0 REFILLS      Last UDS: 10/22/21     Med Agreement Signed On: 19     Next Appointment: 2022    Future appointment scheduled    Requested Prescriptions     Pending Prescriptions Disp Refills    oxyCODONE (OXY-IR) 15 MG immediate release tablet 80 tablet 0     Sig: Take 1 tablet by mouth every 8 hours as needed for Pain for up to 30 days.  MUST LAST 1 MO    pregabalin (LYRICA) 150 MG capsule 60 capsule 3

## 2022-05-17 RX ORDER — PREGABALIN 150 MG/1
CAPSULE ORAL
Qty: 60 CAPSULE | Refills: 3 | Status: SHIPPED | OUTPATIENT
Start: 2022-05-17 | End: 2022-06-27 | Stop reason: SDUPTHER

## 2022-05-17 RX ORDER — OXYCODONE HYDROCHLORIDE 15 MG/1
15 TABLET ORAL EVERY 8 HOURS PRN
Qty: 80 TABLET | Refills: 0 | Status: SHIPPED | OUTPATIENT
Start: 2022-05-17 | End: 2022-06-14 | Stop reason: SDUPTHER

## 2022-05-18 DIAGNOSIS — G89.29 CHRONIC BILATERAL LOW BACK PAIN WITH SCIATICA, SCIATICA LATERALITY UNSPECIFIED: Primary | ICD-10-CM

## 2022-05-18 DIAGNOSIS — M54.40 CHRONIC BILATERAL LOW BACK PAIN WITH SCIATICA, SCIATICA LATERALITY UNSPECIFIED: Primary | ICD-10-CM

## 2022-05-23 ENCOUNTER — CARE COORDINATION (OUTPATIENT)
Dept: CARE COORDINATION | Age: 62
End: 2022-05-23

## 2022-05-23 SDOH — HEALTH STABILITY: PHYSICAL HEALTH: ON AVERAGE, HOW MANY DAYS PER WEEK DO YOU ENGAGE IN MODERATE TO STRENUOUS EXERCISE (LIKE A BRISK WALK)?: 4 DAYS

## 2022-05-23 SDOH — ECONOMIC STABILITY: TRANSPORTATION INSECURITY
IN THE PAST 12 MONTHS, HAS LACK OF TRANSPORTATION KEPT YOU FROM MEETINGS, WORK, OR FROM GETTING THINGS NEEDED FOR DAILY LIVING?: NO

## 2022-05-23 SDOH — ECONOMIC STABILITY: HOUSING INSECURITY: IN THE LAST 12 MONTHS, HOW MANY PLACES HAVE YOU LIVED?: 1

## 2022-05-23 SDOH — ECONOMIC STABILITY: TRANSPORTATION INSECURITY
IN THE PAST 12 MONTHS, HAS THE LACK OF TRANSPORTATION KEPT YOU FROM MEDICAL APPOINTMENTS OR FROM GETTING MEDICATIONS?: NO

## 2022-05-23 SDOH — ECONOMIC STABILITY: HOUSING INSECURITY
IN THE LAST 12 MONTHS, WAS THERE A TIME WHEN YOU DID NOT HAVE A STEADY PLACE TO SLEEP OR SLEPT IN A SHELTER (INCLUDING NOW)?: NO

## 2022-05-23 SDOH — ECONOMIC STABILITY: INCOME INSECURITY: IN THE LAST 12 MONTHS, WAS THERE A TIME WHEN YOU WERE NOT ABLE TO PAY THE MORTGAGE OR RENT ON TIME?: NO

## 2022-05-23 SDOH — HEALTH STABILITY: PHYSICAL HEALTH: ON AVERAGE, HOW MANY MINUTES DO YOU ENGAGE IN EXERCISE AT THIS LEVEL?: 10 MIN

## 2022-05-23 ASSESSMENT — LIFESTYLE VARIABLES: HOW OFTEN DO YOU HAVE A DRINK CONTAINING ALCOHOL: NEVER

## 2022-05-23 NOTE — CARE COORDINATION
Ambulatory Care Coordination Note  5/23/2022  CM Risk Score: 7  Charlson 10 Year Mortality Risk Score: 100%     ACC: Jl Diaz RN    Summary Note: ACM completed follow up call with patient. Patient said she was doing well and thinks insurance will cover until July 31st. Patient said at this time she is able to afford medications, but will use the copayment cards when needed. Patient reports fasting blood sugars being less than 150 and evening blood sugars being less than 200. ACM updated patient profile during conversation. Patient had no other concerns at this time. Plan:    F/U call 2 weeks  Bs logs    Lab Results     None          Care Coordination Interventions    Referral from Primary Care Provider: No  Suggested Interventions and Community Resources  Diabetes Education: In Process (Comment: DM Know your numbers 5/13/22 CJT)  Medication Assistance Program: In Process (Comment: Signed patient up for Lyrica PAP and Trulicity PAP 3/05/80 CJT)  Zone Management Tools: In Process (Comment: COPD/ DM Mangement tools 5/13/22 CJT)         Goals Addressed                 This Visit's Progress     Medication Management   Improving     I will take my medication as directed. I will notify my provider of any problems with medications, like adverse effects or side effects. I will notify my provider/Care Coordinator if I am unable to afford my medications. I will notify my provider for advice before I stop taking any of my medication.     Barriers: financial  Plan for overcoming my barriers: ACM will look into MHPP and PAP to assist with cost  Confidence: 8/10  Anticipated Goal Completion Date: 7/13/22       Self Monitoring   Improving     Self-Monitored Blood Glucose - I will check my blood sugar Fasting blood sugar    None Recently Recorded    Barriers: lack of motivation  Plan for overcoming my barriers: ACM will call weekly to obtain Blood Sugar values  Confidence: 8/10  Anticipated Goal Completion Date: for symptoms of low blood sugar?: No       No patient-reported symptoms      ,   COPD Assessment    Does the patient understand envrionmental exposure?: Yes  Is the patient able to verbalize Rescue vs. Long Acting medications?: No  Does the patient have a nebulizer?: No  Does the patient use a space with inhaled medications?: No     No patient-reported symptoms         Symptoms:         and   General Assessment    Do you have any symptoms that are causing concern?: No

## 2022-05-24 ENCOUNTER — HOSPITAL ENCOUNTER (OUTPATIENT)
Dept: MRI IMAGING | Age: 62
Discharge: HOME | End: 2022-05-24
Payer: COMMERCIAL

## 2022-05-24 DIAGNOSIS — G89.29 CHRONIC BILATERAL LOW BACK PAIN WITH SCIATICA, SCIATICA LATERALITY UNSPECIFIED: ICD-10-CM

## 2022-05-24 DIAGNOSIS — M54.40 CHRONIC BILATERAL LOW BACK PAIN WITH SCIATICA, SCIATICA LATERALITY UNSPECIFIED: ICD-10-CM

## 2022-05-24 PROCEDURE — 72148 MRI LUMBAR SPINE W/O DYE: CPT

## 2022-05-25 DIAGNOSIS — M54.40 CHRONIC BILATERAL LOW BACK PAIN WITH SCIATICA, SCIATICA LATERALITY UNSPECIFIED: Primary | ICD-10-CM

## 2022-05-25 DIAGNOSIS — G89.29 CHRONIC BILATERAL LOW BACK PAIN WITH SCIATICA, SCIATICA LATERALITY UNSPECIFIED: Primary | ICD-10-CM

## 2022-05-25 NOTE — RESULT ENCOUNTER NOTE
I talked to the patient on Wednesday evening-explained her MRI concerning the disc problem and the possible abnormality of the right kidney. I also did a referral for -please call her with the number and the address so she can set up a time.

## 2022-06-06 ENCOUNTER — CARE COORDINATION (OUTPATIENT)
Dept: CARE COORDINATION | Age: 62
End: 2022-06-06

## 2022-06-06 SDOH — HEALTH STABILITY: PHYSICAL HEALTH: ON AVERAGE, HOW MANY MINUTES DO YOU ENGAGE IN EXERCISE AT THIS LEVEL?: 0 MIN

## 2022-06-06 SDOH — HEALTH STABILITY: PHYSICAL HEALTH
ON AVERAGE, HOW MANY DAYS PER WEEK DO YOU ENGAGE IN MODERATE TO STRENUOUS EXERCISE (LIKE A BRISK WALK)?: PATIENT DECLINED

## 2022-06-06 ASSESSMENT — SOCIAL DETERMINANTS OF HEALTH (SDOH)
WITHIN THE LAST YEAR, HAVE YOU BEEN KICKED, HIT, SLAPPED, OR OTHERWISE PHYSICALLY HURT BY YOUR PARTNER OR EX-PARTNER?: NO
WITHIN THE LAST YEAR, HAVE YOU BEEN HUMILIATED OR EMOTIONALLY ABUSED IN OTHER WAYS BY YOUR PARTNER OR EX-PARTNER?: PATIENT DECLINED
WITHIN THE LAST YEAR, HAVE TO BEEN RAPED OR FORCED TO HAVE ANY KIND OF SEXUAL ACTIVITY BY YOUR PARTNER OR EX-PARTNER?: NO
WITHIN THE LAST YEAR, HAVE YOU BEEN AFRAID OF YOUR PARTNER OR EX-PARTNER?: PATIENT DECLINED

## 2022-06-06 NOTE — CARE COORDINATION
Ambulatory Care Coordination Note  6/6/2022  CM Risk Score: 7  Charlson 10 Year Mortality Risk Score: 100%     ACC: Yesenia Mcdaniel RN    Summary Note: ACM completed follow up call with patient. Patient said she is doing well at this time. Patient reports blood sugars being 150-180 when she remembers to check blood sugars. Patient said at this time she does not need anything further from [employer]. Patient said if she has any issues or concerns she will call ACM to discuss. Plan:    Graduate from [address] [place] [address]    Lab Results     None          Care Coordination Interventions    Referral from Primary Care Provider: No  Suggested Interventions and Community Resources  Diabetes Education: In Process (Comment: DM Know your numbers 5/13/22 CJT)  Medication Assistance Program: In Process (Comment: Signed patient up for Lyrica PAP and Trulicity PAP 1/64/90 T)  Zone Management Tools: In Process (Comment: COPD/ DM Mangement tools 5/13/22 CJT)         Goals Addressed                 This Visit's Progress     COMPLETED: Medication Management        I will take my medication as directed. I will notify my provider of any problems with medications, like adverse effects or side effects. I will notify my provider/Care Coordinator if I am unable to afford my medications. I will notify my provider for advice before I stop taking any of my medication. Barriers: financial  Plan for overcoming my barriers: ACM will look into MHPP and PAP to assist with cost  Confidence: 8/10  Anticipated Goal Completion Date: 7/13/22       COMPLETED: Self Monitoring   Improving     Self-Monitored Blood Glucose - I will check my blood sugar Fasting blood sugar    None Recently Recorded    Barriers: lack of motivation  Plan for overcoming my barriers: ACM will call weekly to obtain Blood Sugar values  Confidence: 8/10  Anticipated Goal Completion Date: 7/13/22              Prior to Admission medications    Medication Sig Start Date End Date Taking? Authorizing Provider   oxyCODONE (OXY-IR) 15 MG immediate release tablet Take 1 tablet by mouth every 8 hours as needed for Pain for up to 30 days. MUST LAST 1 MO 5/17/22 6/16/22  Ash Garber DO   pregabalin (LYRICA) 150 MG capsule 1 bid 5/17/22 8/13/22  Ash Garber DO   pravastatin (PRAVACHOL) 20 MG tablet TAKE ONE TABLET BY MOUTH DAILY 4/25/22   Ash Garber DO   glipiZIDE (GLUCOTROL XL) 10 MG extended release tablet TAKE ONE TABLET BY MOUTH DAILY 4/25/22   Ash Garber DO   ondansetron (ZOFRAN) 8 MG tablet Take 1 tablet by mouth every 8 hours as needed for Nausea or Vomiting 3/18/22   Ash Garber DO   blood glucose test strips (ONETOUCH ULTRA) strip CHECK BLOOD SUGAR TWO TIMES A DAY 2/14/22   Ash Garber DO   Dulaglutide (TRULICITY) 3 AA/0.7QA SOPN INJECT 3 MG UNDER THE SKIN ONCE WEEKLY 1/17/22   ANIYA Arroyo - CNP   chlorthalidone (HYGROTON) 25 MG tablet Take one tab by mouth daily 1/17/22   ANIYA Arroyo - ELIANA   Handicap Placard MISC by Does not apply route I have evaluated this patient and Joce Guajardo needs handicap placard for 5 years. 10/22/21   LESLIE Mason   glucose monitoring kit (FREESTYLE) monitoring kit One Touch Ultra Meter Dx E11.9-on meds-no insulin 1/25/21   Ash Garber DO   aspirin 81 MG tablet Take 81 mg by mouth daily    Historical Provider, MD       Future Appointments   Date Time Provider Dora Licona   6/8/2022  8:20 AM Geronimo Zaldivar [facility]   6/8/2022  4:30 PM DO NINA Masterson     ,   Diabetes Assessment    Meal Planning: Avoidance of concentrated sweets, Calorie counting   How often do you test your blood sugar?: Daily   Do you have barriers with adherence to non-pharmacologic self-management interventions?  (Nutrition/Exercise/Self-Monitoring): No   Have you ever had to go to the ED for symptoms of low blood sugar?: No       No patient-reported symptoms      ,   COPD Assessment    Does the

## 2022-06-08 ENCOUNTER — OFFICE VISIT (OUTPATIENT)
Dept: ORTHOPEDIC SURGERY | Age: 62
End: 2022-06-08
Payer: COMMERCIAL

## 2022-06-08 ENCOUNTER — OFFICE VISIT (OUTPATIENT)
Dept: FAMILY MEDICINE CLINIC | Age: 62
End: 2022-06-08
Payer: COMMERCIAL

## 2022-06-08 VITALS
BODY MASS INDEX: 52.67 KG/M2 | HEART RATE: 110 BPM | WEIGHT: 279 LBS | DIASTOLIC BLOOD PRESSURE: 82 MMHG | SYSTOLIC BLOOD PRESSURE: 152 MMHG | OXYGEN SATURATION: 92 % | HEIGHT: 61 IN

## 2022-06-08 VITALS — WEIGHT: 276 LBS | BODY MASS INDEX: 52.11 KG/M2 | HEIGHT: 61 IN

## 2022-06-08 DIAGNOSIS — G89.4 CHRONIC PAIN SYNDROME: Primary | ICD-10-CM

## 2022-06-08 DIAGNOSIS — M48.062 SPINAL STENOSIS OF LUMBAR REGION WITH NEUROGENIC CLAUDICATION: ICD-10-CM

## 2022-06-08 DIAGNOSIS — M48.062 SPINAL STENOSIS OF LUMBAR REGION WITH NEUROGENIC CLAUDICATION: Primary | ICD-10-CM

## 2022-06-08 DIAGNOSIS — E11.42 TYPE 2 DIABETES MELLITUS WITH DIABETIC POLYNEUROPATHY, WITHOUT LONG-TERM CURRENT USE OF INSULIN (HCC): ICD-10-CM

## 2022-06-08 LAB — HBA1C MFR BLD: 8.5 %

## 2022-06-08 PROCEDURE — 3052F HG A1C>EQUAL 8.0%<EQUAL 9.0%: CPT | Performed by: FAMILY MEDICINE

## 2022-06-08 PROCEDURE — 3017F COLORECTAL CA SCREEN DOC REV: CPT | Performed by: FAMILY MEDICINE

## 2022-06-08 PROCEDURE — 83036 HEMOGLOBIN GLYCOSYLATED A1C: CPT | Performed by: FAMILY MEDICINE

## 2022-06-08 PROCEDURE — G8427 DOCREV CUR MEDS BY ELIG CLIN: HCPCS | Performed by: FAMILY MEDICINE

## 2022-06-08 PROCEDURE — 99213 OFFICE O/P EST LOW 20 MIN: CPT | Performed by: FAMILY MEDICINE

## 2022-06-08 PROCEDURE — 99204 OFFICE O/P NEW MOD 45 MIN: CPT | Performed by: ORTHOPAEDIC SURGERY

## 2022-06-08 PROCEDURE — G8427 DOCREV CUR MEDS BY ELIG CLIN: HCPCS | Performed by: ORTHOPAEDIC SURGERY

## 2022-06-08 PROCEDURE — 3017F COLORECTAL CA SCREEN DOC REV: CPT | Performed by: ORTHOPAEDIC SURGERY

## 2022-06-08 PROCEDURE — G8417 CALC BMI ABV UP PARAM F/U: HCPCS | Performed by: ORTHOPAEDIC SURGERY

## 2022-06-08 PROCEDURE — G8417 CALC BMI ABV UP PARAM F/U: HCPCS | Performed by: FAMILY MEDICINE

## 2022-06-08 PROCEDURE — 4004F PT TOBACCO SCREEN RCVD TLK: CPT | Performed by: FAMILY MEDICINE

## 2022-06-08 PROCEDURE — 4004F PT TOBACCO SCREEN RCVD TLK: CPT | Performed by: ORTHOPAEDIC SURGERY

## 2022-06-08 PROCEDURE — 2022F DILAT RTA XM EVC RTNOPTHY: CPT | Performed by: FAMILY MEDICINE

## 2022-06-08 ASSESSMENT — ENCOUNTER SYMPTOMS: BACK PAIN: 1

## 2022-06-08 NOTE — PROGRESS NOTES
Subjective:      Patient ID: Adrianne Soto is a 57 y.o. female. HPI  Patient in for checkup after seeing orthopedic surgeon for her back pain radiating into her right leg-the specialist explained her MRI with foraminal stenosis and herniated disc and osteoarthritis and told her she was basically not a good candidate for surgery. He did send her for therapy which she has not started yet and she will consider epidural when the time comes. Review of Systems    Review of Systems   Musculoskeletal: Positive for arthralgias and back pain. Objective:   Physical Exam      Physical Exam  Constitutional:       Appearance: Normal appearance. She is obese. Comments: She is teary-eyed on occasion during the visit   Musculoskeletal:      Comments: Patient is on a scooter and the exam was deferred. She was just examined recently by the orthopedic specialist and he has recommended physical therapy and possible epidural   Neurological:      Mental Status: She is alert. Assessment:       Diagnosis Orders   1. Chronic pain syndrome     2. Spinal stenosis of lumbar region with neurogenic claudication     3. Type 2 diabetes mellitus with diabetic polyneuropathy, without long-term current use of insulin ([facility])  POCT glycosylated hemoglobin (Hb A1C)         Plan:      Fabiola Redman was seen today for 6 month follow-up. Diagnoses and all orders for this visit:    Chronic pain syndrome  Continue medications as needed and hopefully you can start physical therapy soon and consider epidurals later. May increase Lyrica to 150 mg 3 times a day. Spinal stenosis of lumbar region with neurogenic claudication  As above  Type 2 diabetes mellitus with diabetic polyneuropathy, without long-term current use of insulin ([facility])  -     POCT glycosylated hemoglobin (Hb A1C)  A1c 8.4 start taking 2 glimepiride 10 mg daily    This is been approximately a 20-minute visit with the patient. See me 3 months.      Negra Novak, DO

## 2022-06-08 NOTE — PROGRESS NOTES
New Patient: LUMBAR SPINE    Referring Provider:  Zakiya Bae DO    CHIEF COMPLAINT:    Chief Complaint   Patient presents with    Lower Back Pain     lumbar pain       HISTORY OF PRESENT ILLNESS:    Ms. Joshua Adorno  is a pleasant 57 y.o. female kindly referred by Dr. Emma Ang for the evaluation of low back pain. Her symptoms Began with a work-related injury about 26 years ago. They have increased since that time. She rates her low back and right buttock and posterior thigh pain 10/10. She notes numbness in both lower extremities from the knee down. She has been using a scooter for years secondary to leg weakness. She denies saddle anesthesia and bowel bladder abnormality.         Current/Past Treatment:   · Physical Therapy: None  · Chiropractic: None  · Injection: None  · Medications: Oxycodone and Lyrica    Past Medical History:   Past Medical History:   Diagnosis Date    Abnormal MRI, kidney (atrophic, dysmoprhic, no discrete mass) 05/25/2016    JEAN (acute kidney injury) [facility]) July, 2015    Nephrologist- Dr Osbaldo Conner Anemia     Diabetic ulcer of right heel ([address] 10/2019    Gangrenous toe ([address] 9/30/2015    Hydronephrosis, right 7/12/2015    Necrotizing fasciitis 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    urine      Past Surgical History:     Past Surgical History:   Procedure Laterality Date    CHOLECYSTECTOMY      CYSTOSCOPY  10/26/2015    BL stent removal    LITHOTRIPSY Bilateral 08/17/2015    cysto, stent exchange, ureteroscopy with laser lithotripsy and stone basket extraction    TUBAL LIGATION      URETER STENT PLACEMENT Bilateral 10/01/2011    cystoscopy, ureteroscopy, retrograde, stent placement    UTERINE FIBROID SURGERY       Current Medications:     Current Outpatient Medications:     oxyCODONE (OXY-IR) 15 MG immediate release tablet, Take 1 tablet by mouth every 8 hours as needed for Pain for up to 30 days. MUST LAST 1 MO, Disp: 80 tablet, Rfl: 0    pregabalin (LYRICA) 150 MG capsule, 1 bid, Disp: 60 capsule, Rfl: 3    pravastatin (PRAVACHOL) 20 MG tablet, TAKE ONE TABLET BY MOUTH DAILY, Disp: 90 tablet, Rfl: 0    glipiZIDE (GLUCOTROL XL) 10 MG extended release tablet, TAKE ONE TABLET BY MOUTH DAILY, Disp: 90 tablet, Rfl: 0    ondansetron (ZOFRAN) 8 MG tablet, Take 1 tablet by mouth every 8 hours as needed for Nausea or Vomiting, Disp: 90 tablet, Rfl: 1    blood glucose test strips (ONETOUCH ULTRA) strip, CHECK BLOOD SUGAR TWO TIMES A DAY, Disp: 200 strip, Rfl: 3    Dulaglutide (TRULICITY) 3 FX/8.1IP SOPN, INJECT 3 MG UNDER THE SKIN ONCE WEEKLY, Disp: 4 pen, Rfl: 5    chlorthalidone (HYGROTON) 25 MG tablet, Take one tab by mouth daily, Disp: 90 tablet, Rfl: 3    Handicap Placard MISC, by Does not apply route I have evaluated this patient and Peggy Adams needs handicap placard for 5 years. , Disp: 1 each, Rfl: 0    glucose monitoring kit (FREESTYLE) monitoring kit, One Touch Ultra Meter Dx E11.9-on meds-no insulin, Disp: 1 kit, Rfl: 0    aspirin 81 MG tablet, Take 81 mg by mouth daily, Disp: , Rfl:   Allergies:  Dilaudid [hydromorphone hcl] and Septra [sulfamethoxazole-trimethoprim]  Social History:    reports that she has been smoking cigarettes. She has a 6.50 pack-year smoking history. She has never used smokeless tobacco. She reports that she does not drink alcohol and does not use drugs.   Family History:   Family History   Problem Relation Age of Onset    Alcohol Abuse Father     Cancer Father     Diabetes Maternal Aunt     Diabetes Paternal Aunt        REVIEW OF SYSTEMS: Full ROS noted & scanned   CONSTITUTIONAL: Denies unexplained weight loss, fevers, chills or fatigue  NEUROLOGICAL: Denies unsteady gait or progressive weakness  MUSCULOSKELETAL: Denies joint swelling or redness  PSYCHOLOGICAL: Denies anxiety, depression   SKIN: Denies skin changes, delayed healing, rash, itching   HEMATOLOGIC: Denies easy bleeding or bruising  ENDOCRINE: Denies excessive thirst, urination, heat/cold  RESPIRATORY: Denies current dyspnea, cough  GI: Denies nausea, vomiting, diarrhea   : Denies bowel or bladder issues      PHYSICAL EXAM:    Vitals: Height 5' 1\" (1.549 m), weight 276 lb (125.2 kg), not currently breastfeeding. GENERAL EXAM:  · General Apparence: Patient is adequately groomed with no evidence of malnutrition. · Orientation: The patient is oriented to time, place and person. · Mood & Affect:The patient's mood and affect are appropriate. · Vascular: Examination reveals no swelling tenderness in upper or lower extremities. Good capillary refill. · Lymphatic: The lymphatic examination bilaterally reveals all areas to be without enlargement or induration  · Sensation: Sensation is intact without deficit  · Coordination/Balance: Good coordination     LUMBAR/SACRAL EXAMINATION:  · Inspection: Local inspection shows no step-off or bruising. Lumbar alignment is normal.  Sagittal and Coronal balance is neutral.      · Palpation:   No evidence of tenderness at the midline. No tenderness bilaterally at the paraspinal or trochanters. There is no step-off or paraspinal spasm. · Range of Motion: Lumbar flexion, extension and rotation are mildly limited due to pain. · Strength:   Strength testing is 5/5 in all muscle groups tested. · Special Tests:   Straight leg raise and crossed SLR negative. Leg length and pelvis level. · Skin: There are no rashes, ulcerations or lesions. · Reflexes: Reflexes are symmetrically 2+ at the patellar and ankle tendons. Clonus absent bilaterally at the feet.   · Gait & station: normal, patient ambulates without assistance    · Additional Examinations:   · RIGHT LOWER EXTREMITY: Inspection/examination of the right lower extremity does not show any tenderness, deformity or injury. Range of motion is unremarkable. There is no gross instability. There are no rashes, ulcerations or lesions. Strength and tone are normal.  She has chronic skin changes of both lower extremities    · LEFT LOWER EXTREMITY:  Inspection/examination of the left lower extremity does not show any tenderness, deformity or injury. Range of motion is unremarkable. There is no gross instability. There are no rashes, ulcerations or lesions. Strength and tone are normal. She has chronic skin changes of both lower extremities      Diagnostic Testing:    I reviewed MRI images of her lumbar spine from 5/25/2022 in the office today. Those show multilevel degenerative disc changes, degenerative spondylolisthesis L4-L5, moderate right lateral recess stenosis L5-S1 a right paracentral disc herniation L5-S1    Impression:   Denerative spondylolisthesis  Right paracentral disc herniation L5-S1    Plan:    Discussed treatment options including observation, physical therapy, epidural injection, and additional imaging. She would like to proceed with physical therapy and epidural injection.

## 2022-06-09 ENCOUNTER — COMMUNITY OUTREACH (OUTPATIENT)
Dept: OTHER | Age: 62
End: 2022-06-09

## 2022-06-09 ENCOUNTER — CARE COORDINATION (OUTPATIENT)
Dept: CARE COORDINATION | Age: 62
End: 2022-06-09

## 2022-06-09 NOTE — PROGRESS NOTES
[facility]-SW spoke with patient on this date as referred by [facility]. Patient reports planning to lose insurance 7/31/2022 due to 's employment ending. SW discussed possible Marketplace eligibility and will look into plan options for both the patient and . SW to follow up in middle of July to go over options and how to apply/enroll. SW and patient discussed how this may change at the end of the year and if become unaffordable, patient to be enrolled in [facility]. Patient voiced understanding and agreement to plan.

## 2022-06-09 NOTE — CARE COORDINATION
ACM had received an incoming call from patient. Patient said she was concerned because she would not qualify for surgery and Dr. Asia Perez suggested epidural injections for pain. Patient was concerned that her insurance would run out and would not be able to afford further injections. [facility] said she would make referral for [facility]. Patient agreed at this time. Patient had no other concerns at this time.

## 2022-06-13 ENCOUNTER — TELEPHONE (OUTPATIENT)
Dept: FAMILY MEDICINE CLINIC | Age: 62
End: 2022-06-13

## 2022-06-13 NOTE — TELEPHONE ENCOUNTER
Patient called stating that PCP asked for patient to call back in a few days after starting need medications. Patient is requesting to speak with PCP and can be reached at [phone]. No other information would ne given to writer.  Thanks, Please advise

## 2022-06-14 DIAGNOSIS — G89.4 CHRONIC PAIN SYNDROME: ICD-10-CM

## 2022-06-14 RX ORDER — OXYCODONE HYDROCHLORIDE 15 MG/1
15 TABLET ORAL EVERY 8 HOURS PRN
Qty: 90 TABLET | Refills: 0 | Status: SHIPPED | OUTPATIENT
Start: 2022-06-14 | End: 2022-07-14 | Stop reason: SDUPTHER

## 2022-06-15 ENCOUNTER — COMMUNITY OUTREACH (OUTPATIENT)
Dept: OTHER | Age: 62
End: 2022-06-15

## 2022-06-15 NOTE — PROGRESS NOTES
MT-CRISTEL spoke with patient on this date who reports finding out that her and her spouse's insurance ended 5/31/22. SW and patient discussed income information in more detail and discussed eligibility for special enrollment period through the Marketplace until the end of July. SW to look into plans and reach out to patient to discuss. Patient voiced understanding and agreed with plan. Patient reports being able to get medications.

## 2022-06-16 DIAGNOSIS — E11.42 TYPE 2 DIABETES MELLITUS WITH DIABETIC POLYNEUROPATHY, WITHOUT LONG-TERM CURRENT USE OF INSULIN (HCC): ICD-10-CM

## 2022-06-16 RX ORDER — ONDANSETRON HYDROCHLORIDE 8 MG/1
8 TABLET, FILM COATED ORAL EVERY 8 HOURS PRN
Qty: 90 TABLET | Refills: 0 | Status: SHIPPED | OUTPATIENT
Start: 2022-06-16 | End: 2022-07-21 | Stop reason: SDUPTHER

## 2022-06-16 NOTE — TELEPHONE ENCOUNTER
Refill Request     CONFIRM preferrred pharmacy with the patient. If Mail Order Rx - Pend for 90 day refill.       Last Seen: Last Seen Department: 6/8/2022    Last Seen by PCP: 6/8/2022    Last Written: 3/18/22 90 tablet 1 refill    Next Appointment: 9/12/22  Future Appointments   Date Time Provider Dora Licona   9/12/2022 11:30 AM DO NINA Masterson [place] - [facility]       Future appointment scheduled      Requested Prescriptions     Pending Prescriptions Disp Refills    ondansetron (ZOFRAN) 8 MG tablet 90 tablet 1     Sig: Take 1 tablet by mouth every 8 hours as needed for Nausea or Vomiting

## 2022-06-23 ENCOUNTER — COMMUNITY OUTREACH (OUTPATIENT)
Dept: OTHER | Age: 62
End: 2022-06-23

## 2022-06-23 NOTE — PROGRESS NOTES
MT-SW spoke with patient on this date to follow up on previous conversation. SW to mail available Marketplace plans. Once received, patient to reach out with spouse to SW to review. SW will then go over applying and enrolling. Patient voiced understanding.

## 2022-06-27 DIAGNOSIS — G89.29 OTHER CHRONIC PAIN: ICD-10-CM

## 2022-06-27 RX ORDER — PREGABALIN 150 MG/1
CAPSULE ORAL
Qty: 90 CAPSULE | Refills: 3 | Status: SHIPPED | OUTPATIENT
Start: 2022-06-27 | End: 2022-10-28 | Stop reason: SDUPTHER

## 2022-06-27 RX ORDER — PREGABALIN 150 MG/1
CAPSULE ORAL
Qty: 60 CAPSULE | Refills: 3 | Status: SHIPPED | OUTPATIENT
Start: 2022-06-27 | End: 2022-06-27 | Stop reason: SDUPTHER

## 2022-07-05 ENCOUNTER — CARE COORDINATION (OUTPATIENT)
Dept: CARE COORDINATION | Age: 62
End: 2022-07-05

## 2022-07-05 NOTE — CARE COORDINATION
ACM called patient who had left a voicemail for over the weekend that coupon savings code for Trulicity would not work d/t patient not having health insurance. Patient said she had received insurance information from Iglesia Cordero and would be reviewing this information with her today. ACM informed patient regarding [facility] to assist in the cost of Trulicity. Patient said that Iglesia Cordero had a long conversation regarding PAP and insurance and this would disqualify patient from program. ACM directed kimberleyn to ask these questions to Iglesia Cordero during their follow up call. Patient had no other questions at this time.

## 2022-07-06 ENCOUNTER — COMMUNITY OUTREACH (OUTPATIENT)
Dept: OTHER | Age: 62
End: 2022-07-06

## 2022-07-06 NOTE — PROGRESS NOTES
MT-SW spoke with patient and spouse on the phone. SW reviewed Marketplace plans mailed to patient. SW reviewed how to apply and enroll via phone. SW also discussed how their change in income may effect costs. Patient to reach out if any issues arise or any other questions. Patient voiced understanding. SW to mail F/A application for account during time without insurance.

## 2022-07-13 ENCOUNTER — COMMUNITY OUTREACH (OUTPATIENT)
Dept: OTHER | Age: 62
End: 2022-07-13

## 2022-07-13 NOTE — PROGRESS NOTES
FRANCIS spoke with patient on this date who reports not receiving application for financial assistance with bill. SW to mail application. Patient reports planning to apply and enroll in [employer] plan today.

## 2022-07-14 DIAGNOSIS — G89.4 CHRONIC PAIN SYNDROME: ICD-10-CM

## 2022-07-14 RX ORDER — OXYCODONE HYDROCHLORIDE 15 MG/1
15 TABLET ORAL EVERY 8 HOURS PRN
Qty: 90 TABLET | Refills: 0 | Status: SHIPPED | OUTPATIENT
Start: 2022-07-14 | End: 2022-08-12 | Stop reason: SDUPTHER

## 2022-07-14 NOTE — TELEPHONE ENCOUNTER
Refill Request - Controlled Substance    CONFIRM preferrred pharmacy with the patient. Last Seen Department: 6/8/2022  Last Seen by PCP: 6/8/2022    Last Written: 6/14/22    Last UDS: 10/22/21    Med Agreement Signed On: 4/17/19    Next Appointment: 9/12/2022    Future appointment scheduled    Requested Prescriptions     Pending Prescriptions Disp Refills    oxyCODONE (OXY-IR) 15 MG immediate release tablet 90 tablet 0     Sig: Take 1 tablet by mouth every 8 hours as needed for Pain for up to 30 days.  MUST LAST 1 MO    okay for early refill

## 2022-07-21 DIAGNOSIS — E11.42 TYPE 2 DIABETES MELLITUS WITH DIABETIC POLYNEUROPATHY, WITHOUT LONG-TERM CURRENT USE OF INSULIN (HCC): ICD-10-CM

## 2022-07-21 RX ORDER — ONDANSETRON HYDROCHLORIDE 8 MG/1
8 TABLET, FILM COATED ORAL EVERY 8 HOURS PRN
Qty: 90 TABLET | Refills: 0 | Status: SHIPPED | OUTPATIENT
Start: 2022-07-21 | End: 2022-09-26 | Stop reason: SDUPTHER

## 2022-07-21 NOTE — TELEPHONE ENCOUNTER
.Refill Request     CONFIRM preferrred pharmacy with the patient. If Mail Order Rx - Pend for 90 day refill.       Last Seen: Last Seen Department: 6/8/2022  Last Seen by PCP: 6/8/2022    Last Written: 6-16-22 90 with 0     Next Appointment:   Future Appointments   Date Time Provider Dora Licona   9/12/2022 11:30 AM DO NINA Masterson  [place] - [facility]       Future appointment scheduled      Requested Prescriptions     Pending Prescriptions Disp Refills    ondansetron (ZOFRAN) 8 MG tablet 90 tablet 0     Sig: Take 1 tablet by mouth every 8 hours as needed for Nausea or Vomiting

## 2022-08-02 ENCOUNTER — COMMUNITY OUTREACH (OUTPATIENT)
Dept: OTHER | Age: 62
End: 2022-08-02

## 2022-08-02 NOTE — PROGRESS NOTES
SILVINA-CRISTEL spoke with patient on this date who reports having hospitalization prior to Marketplace coverage going into effect. SW and patient discussed that financial assistance through the hospitals will not effect Marketplace coverage. Patient to apply through other hospital where stay occurred. Patient to call with any further questions.

## 2022-08-04 DIAGNOSIS — E11.42 TYPE 2 DIABETES MELLITUS WITH DIABETIC POLYNEUROPATHY, WITHOUT LONG-TERM CURRENT USE OF INSULIN (HCC): ICD-10-CM

## 2022-08-04 RX ORDER — GLIPIZIDE 10 MG/1
TABLET, FILM COATED, EXTENDED RELEASE ORAL
Qty: 180 TABLET | Refills: 1 | Status: SHIPPED | OUTPATIENT
Start: 2022-08-04 | End: 2022-08-29 | Stop reason: SDUPTHER

## 2022-08-12 ENCOUNTER — PATIENT MESSAGE (OUTPATIENT)
Dept: FAMILY MEDICINE CLINIC | Age: 62
End: 2022-08-12

## 2022-08-12 DIAGNOSIS — G89.4 CHRONIC PAIN SYNDROME: ICD-10-CM

## 2022-08-12 RX ORDER — PEN NEEDLE, DIABETIC 31 G X1/4"
1 NEEDLE, DISPOSABLE MISCELLANEOUS DAILY
Qty: 100 EACH | Refills: 3 | Status: SHIPPED | OUTPATIENT
Start: 2022-08-12

## 2022-08-12 RX ORDER — OXYCODONE HYDROCHLORIDE 15 MG/1
15 TABLET ORAL EVERY 8 HOURS PRN
Qty: 90 TABLET | Refills: 0 | Status: SHIPPED | OUTPATIENT
Start: 2022-08-12 | End: 2022-09-12 | Stop reason: SDUPTHER

## 2022-08-12 NOTE — TELEPHONE ENCOUNTER
From: Kaya Nichols  To: Dr. Willie Tom: 8/12/2022 12:09 AM EDT  Subject: Lantis pen     Do I need a prescription for the lantis needles?

## 2022-08-12 NOTE — TELEPHONE ENCOUNTER
.Refill Request - Controlled Substance    CONFIRM preferrred pharmacy with the patient. Last Seen Department: 6/8/2022  Last Seen by PCP: 6/8/2022    Last Written: 7-14-22 90 with 0     Last UDS: 10-2-21    Med Agreement Signed On: 4-17-19    Next Appointment: 9/12/2022    Future appointment scheduled    Requested Prescriptions     Pending Prescriptions Disp Refills    oxyCODONE (OXY-IR) 15 MG immediate release tablet 90 tablet 0     Sig: Take 1 tablet by mouth every 8 hours as needed for Pain for up to 30 days.  MUST LAST 1 MO    okay for early refill

## 2022-08-29 DIAGNOSIS — E11.42 TYPE 2 DIABETES MELLITUS WITH DIABETIC POLYNEUROPATHY, WITHOUT LONG-TERM CURRENT USE OF INSULIN (HCC): ICD-10-CM

## 2022-08-29 RX ORDER — GLIPIZIDE 10 MG/1
TABLET, FILM COATED, EXTENDED RELEASE ORAL
Qty: 180 TABLET | Refills: 0 | Status: SHIPPED | OUTPATIENT
Start: 2022-08-29

## 2022-09-12 ENCOUNTER — OFFICE VISIT (OUTPATIENT)
Dept: FAMILY MEDICINE CLINIC | Age: 62
End: 2022-09-12
Payer: COMMERCIAL

## 2022-09-12 VITALS
WEIGHT: 272.6 LBS | BODY MASS INDEX: 51.09 KG/M2 | DIASTOLIC BLOOD PRESSURE: 82 MMHG | OXYGEN SATURATION: 92 % | HEART RATE: 68 BPM | SYSTOLIC BLOOD PRESSURE: 126 MMHG

## 2022-09-12 DIAGNOSIS — E11.42 TYPE 2 DIABETES MELLITUS WITH DIABETIC POLYNEUROPATHY, WITHOUT LONG-TERM CURRENT USE OF INSULIN (HCC): Primary | ICD-10-CM

## 2022-09-12 DIAGNOSIS — E11.42 TYPE 2 DIABETES MELLITUS WITH DIABETIC POLYNEUROPATHY, WITHOUT LONG-TERM CURRENT USE OF INSULIN (HCC): ICD-10-CM

## 2022-09-12 DIAGNOSIS — G89.4 CHRONIC PAIN SYNDROME: ICD-10-CM

## 2022-09-12 DIAGNOSIS — I10 ESSENTIAL HYPERTENSION: ICD-10-CM

## 2022-09-12 PROCEDURE — 99213 OFFICE O/P EST LOW 20 MIN: CPT | Performed by: FAMILY MEDICINE

## 2022-09-12 PROCEDURE — 3052F HG A1C>EQUAL 8.0%<EQUAL 9.0%: CPT | Performed by: FAMILY MEDICINE

## 2022-09-12 RX ORDER — INSULIN GLARGINE 100 [IU]/ML
INJECTION, SOLUTION SUBCUTANEOUS
Qty: 5 ADJUSTABLE DOSE PRE-FILLED PEN SYRINGE | Refills: 3 | Status: SHIPPED | OUTPATIENT
Start: 2022-09-12

## 2022-09-12 RX ORDER — OXYCODONE HYDROCHLORIDE 15 MG/1
15 TABLET ORAL EVERY 8 HOURS PRN
Qty: 90 TABLET | Refills: 0 | Status: SHIPPED | OUTPATIENT
Start: 2022-09-12 | End: 2022-10-12 | Stop reason: SDUPTHER

## 2022-09-12 RX ORDER — OXYCODONE HYDROCHLORIDE 15 MG/1
15 TABLET ORAL EVERY 8 HOURS PRN
Qty: 90 TABLET | Refills: 0 | Status: CANCELLED | OUTPATIENT
Start: 2022-09-12 | End: 2022-10-12

## 2022-09-12 RX ORDER — DULAGLUTIDE 3 MG/.5ML
INJECTION, SOLUTION SUBCUTANEOUS
Qty: 4 ADJUSTABLE DOSE PRE-FILLED PEN SYRINGE | Refills: 5 | Status: SHIPPED | OUTPATIENT
Start: 2022-09-12 | End: 2022-09-13 | Stop reason: SDUPTHER

## 2022-09-12 ASSESSMENT — ENCOUNTER SYMPTOMS
SHORTNESS OF BREATH: 1
BLOOD IN STOOL: 0
ABDOMINAL PAIN: 0
BACK PAIN: 1

## 2022-09-12 NOTE — TELEPHONE ENCOUNTER
Refill Request - Controlled Substance    CONFIRM preferrred pharmacy with the patient. If Mail Order Rx - Pend for 90 day refill. Last Seen Department: 6/8/2022    Last Seen by PCP: 6/8/2022    Last Written: 8/12/22 90 tablet 0 refills    Last UDS: 10/22/21     Med Agreement Signed On: 4/17/19     If no future appointment scheduled, route STAFF MESSAGE with patient name to the [facility] for scheduling. CONFIRM preferrred pharmacy with the patient. Next Appointment: 9/12/22  Future Appointments   Date Time Provider Dora Licona   9/12/2022 11:30 AM DO NINA Masterson [place] - [facility]       Message sent to [address] [place] [address] to schedule appt with patient? NO      Requested Prescriptions     Pending Prescriptions Disp Refills    oxyCODONE (OXY-IR) 15 MG immediate release tablet 90 tablet 0     Sig: Take 1 tablet by mouth every 8 hours as needed for Pain for up to 30 days.  MUST LAST 1 MO    okay for early refill

## 2022-09-12 NOTE — TELEPHONE ENCOUNTER
Patient had an appointment today and was supposed to get a printed prescription for Trulicity. Please mail to patient or advise how to get this.

## 2022-09-12 NOTE — PROGRESS NOTES
Subjective:      Patient ID: Destinee Lloyd is a 57 y.o. female. HPI  Patient in for 3-month checkup on her chronic pain along with her diabetes and hypertension--she has lost 8 pounds in the last 3 to 4 months. Last A1c was 8.0. She is working very hard on the weight loss even though she cannot be as active as she used to be. Review of Systems    Review of Systems   Constitutional:  Positive for fatigue. Respiratory:  Positive for shortness of breath. Cardiovascular:  Negative for palpitations and leg swelling. Gastrointestinal:  Negative for abdominal pain and blood in stool. Genitourinary:  Positive for frequency. Musculoskeletal:  Positive for arthralgias and back pain. Objective:   Physical Exam      Physical Exam  Constitutional:       General: She is not in acute distress. Appearance: Normal appearance. She is obese. She is not ill-appearing. Comments: Patient is sitting in her scooter at the time of examination. Cardiovascular:      Rate and Rhythm: Normal rate and regular rhythm. Heart sounds: Normal heart sounds. Pulmonary:      Effort: Pulmonary effort is normal.      Breath sounds: Normal breath sounds. Abdominal:      Palpations: Abdomen is soft. Musculoskeletal:      Right lower leg: No edema. Left lower leg: No edema. Neurological:      Mental Status: She is alert. Psychiatric:         Mood and Affect: Mood normal.         Behavior: Behavior normal.         Thought Content: Thought content normal.         Judgment: Judgment normal.       Assessment:       Diagnosis Orders   1. Type 2 diabetes mellitus with diabetic polyneuropathy, without long-term current use of insulin ([address]        2. Chronic pain syndrome  oxyCODONE (OXY-IR) 15 MG immediate release tablet      3. Essential hypertension              Plan:      Sanjeev Trotter was seen today for 3 month follow-up.     Diagnoses and all orders for this visit:    Type 2 diabetes mellitus with diabetic polyneuropathy, without long-term current use of insulin ([address]  Despite the weight loss her A1c went up to 8.5 from 8.0-continue medications and continue working on weight loss by reducing carbs and increasing activity as tolerated  Chronic pain syndrome  -     oxyCODONE (OXY-IR) 15 MG immediate release tablet; Take 1 tablet by mouth every 8 hours as needed for Pain for up to 30 days. MUST LAST 1 MO    okay for early refill  Continue medication and reduce when possible. Essential hypertension  Continue medications and weight loss as above-no added salt diet-limit caffeine and preferably no alcohol.   Other orders  -     insulin glargine (BASAGLAR KWIKPEN) 100 UNIT/ML injection pen; 18 units hs  This is been approximately a 20-minute visit with the patient    See me 3 months          Ash Garber DO

## 2022-09-13 ENCOUNTER — TELEPHONE (OUTPATIENT)
Dept: ADMINISTRATIVE | Age: 62
End: 2022-09-13

## 2022-09-13 DIAGNOSIS — E11.42 TYPE 2 DIABETES MELLITUS WITH DIABETIC POLYNEUROPATHY, WITHOUT LONG-TERM CURRENT USE OF INSULIN (HCC): ICD-10-CM

## 2022-09-13 RX ORDER — DULAGLUTIDE 3 MG/.5ML
INJECTION, SOLUTION SUBCUTANEOUS
Qty: 4 ADJUSTABLE DOSE PRE-FILLED PEN SYRINGE | Refills: 5 | Status: SHIPPED | OUTPATIENT
Start: 2022-09-13 | End: 2022-09-15 | Stop reason: SDUPTHER

## 2022-09-13 NOTE — TELEPHONE ENCOUNTER
Submitted PA for Trulicity 5YB/4.3XF pen-injectors, Key: [id number]. Approved. This drug has been approved. Approved quantity: 4 units per 28 day(s). The drug has been approved from 08/30/2022 to 09/13/2023. Please notify patient. Thank you.

## 2022-09-15 DIAGNOSIS — E11.42 TYPE 2 DIABETES MELLITUS WITH DIABETIC POLYNEUROPATHY, WITHOUT LONG-TERM CURRENT USE OF INSULIN (HCC): ICD-10-CM

## 2022-09-15 RX ORDER — DULAGLUTIDE 3 MG/.5ML
INJECTION, SOLUTION SUBCUTANEOUS
Qty: 12 ADJUSTABLE DOSE PRE-FILLED PEN SYRINGE | Refills: 3 | Status: SHIPPED | OUTPATIENT
Start: 2022-09-15

## 2022-09-15 NOTE — TELEPHONE ENCOUNTER
Received from [facility] desk will have pt come into the office to pick this up. Spoke with Patient she has already pick her Trucility up from the pharmacy. Prescription is no longer needed. I have shred this form.

## 2022-09-26 DIAGNOSIS — E11.42 TYPE 2 DIABETES MELLITUS WITH DIABETIC POLYNEUROPATHY, WITHOUT LONG-TERM CURRENT USE OF INSULIN (HCC): ICD-10-CM

## 2022-09-26 RX ORDER — ONDANSETRON HYDROCHLORIDE 8 MG/1
8 TABLET, FILM COATED ORAL EVERY 8 HOURS PRN
Qty: 90 TABLET | Refills: 0 | Status: SHIPPED | OUTPATIENT
Start: 2022-09-26

## 2022-09-26 NOTE — TELEPHONE ENCOUNTER
Refill Request     CONFIRM preferrred pharmacy with the patient. If Mail Order Rx - Pend for 90 day refill. Last Seen: Last Seen Department: 9/12/2022    Last Seen by PCP: 9/12/2022    Last Written: 7/21/22 90 tablet 0 refills    If no future appointment scheduled, route STAFF MESSAGE with patient name to the [facility] for scheduling. Next Appointment: 12/12/22  Future Appointments   Date Time Provider Dora Licona   12/12/2022  3:30 PM DO NINA Masterson - [place]       Message sent to [address] [place] [address] to schedule appt with patient?   NO      Requested Prescriptions     Pending Prescriptions Disp Refills    ondansetron (ZOFRAN) 8 MG tablet 90 tablet 0     Sig: Take 1 tablet by mouth every 8 hours as needed for Nausea or Vomiting

## 2022-10-12 DIAGNOSIS — G89.4 CHRONIC PAIN SYNDROME: ICD-10-CM

## 2022-10-12 RX ORDER — OXYCODONE HYDROCHLORIDE 15 MG/1
15 TABLET ORAL EVERY 8 HOURS PRN
Qty: 90 TABLET | Refills: 0 | Status: SHIPPED | OUTPATIENT
Start: 2022-10-12 | End: 2022-11-11

## 2022-10-12 NOTE — TELEPHONE ENCOUNTER
.Refill Request - Controlled Substance    CONFIRM preferrred pharmacy with the patient. If Mail Order Rx - Pend for 90 day refill. Last Seen Department: 9/12/2022  Last Seen by PCP: 9/12/2022    Last Written: 9-12-22 90 with 0     Last UDS: 10-22-21    Med Agreement Signed On: 4-17-19    If no future appointment scheduled, route STAFF MESSAGE with patient name to the [facility] for scheduling. CONFIRM preferrred pharmacy with the patient. Next Appointment:   Future Appointments   Date Time Provider Dora Licona   12/12/2022  3:30 PM DO NINA Masterson [place] - [facility]       Message sent to [facility] to schedule appt with patient? N/A      Requested Prescriptions     Pending Prescriptions Disp Refills    oxyCODONE (OXY-IR) 15 MG immediate release tablet 90 tablet 0     Sig: Take 1 tablet by mouth every 8 hours as needed for Pain for up to 30 days.  MUST LAST 1 MO    okay for early refill

## 2022-10-28 DIAGNOSIS — G89.29 OTHER CHRONIC PAIN: ICD-10-CM

## 2022-10-28 RX ORDER — PREGABALIN 150 MG/1
CAPSULE ORAL
Qty: 90 CAPSULE | Refills: 3 | Status: SHIPPED | OUTPATIENT
Start: 2022-10-28 | End: 2023-01-24

## 2022-10-28 NOTE — TELEPHONE ENCOUNTER
Refill Request - Controlled Substance    CONFIRM preferrred pharmacy with the patient. If Mail Order Rx - Pend for 90 day refill. Last Seen Department: 2022  Last Seen by PCP: 2022    Last Written: 2022 90 capsule 3 refills    Last UDS: 10/22/2021    Med Agreement Signed On: 2019    If no future appointment scheduled, route STAFF MESSAGE with patient name to the [facility] for scheduling. CONFIRM preferrred pharmacy with the patient. Next Appointment:   Future Appointments   Date Time Provider Dora Licona   2022  3:30 PM DO NINA Masterson [place]       Message sent to [address] [place] [address] to schedule appt with patient?   NO      Requested Prescriptions     Pending Prescriptions Disp Refills    pregabalin (LYRICA) 150 MG capsule 90 capsule 3     Si tid

## 2022-10-31 ENCOUNTER — TELEPHONE (OUTPATIENT)
Dept: ADMINISTRATIVE | Age: 62
End: 2022-10-31

## 2022-10-31 NOTE — TELEPHONE ENCOUNTER
PA submitted VIA CMM for  Pregabalin 150MG capsules (Key: [id number])  PENDING    request has been denied

## 2022-11-02 ENCOUNTER — COMMUNITY OUTREACH (OUTPATIENT)
Dept: OTHER | Age: 62
End: 2022-11-02

## 2022-11-02 NOTE — PROGRESS NOTES
SILVINA-SW spoke with patient on this date regarding patient's spouse transition to Medicare next year. SW and patient discussed current income as SW will look into Marketplace plans for patient only for 2023. SW also discussed how Medicare will effect income for 2023 for household. SW to follow up next week after review of plans available. Patient voiced understanding.

## 2022-11-11 DIAGNOSIS — E11.42 TYPE 2 DIABETES MELLITUS WITH DIABETIC POLYNEUROPATHY, WITHOUT LONG-TERM CURRENT USE OF INSULIN (HCC): ICD-10-CM

## 2022-11-11 DIAGNOSIS — G89.4 CHRONIC PAIN SYNDROME: ICD-10-CM

## 2022-11-11 RX ORDER — ONDANSETRON HYDROCHLORIDE 8 MG/1
8 TABLET, FILM COATED ORAL EVERY 8 HOURS PRN
Qty: 90 TABLET | Refills: 0 | OUTPATIENT
Start: 2022-11-11

## 2022-11-11 RX ORDER — ONDANSETRON HYDROCHLORIDE 8 MG/1
8 TABLET, FILM COATED ORAL EVERY 8 HOURS PRN
Qty: 90 TABLET | Refills: 0 | Status: SHIPPED | OUTPATIENT
Start: 2022-11-11

## 2022-11-11 RX ORDER — OXYCODONE HYDROCHLORIDE 15 MG/1
15 TABLET ORAL EVERY 8 HOURS PRN
Qty: 90 TABLET | Refills: 0 | OUTPATIENT
Start: 2022-11-11 | End: 2022-12-11

## 2022-11-11 RX ORDER — OXYCODONE HYDROCHLORIDE 15 MG/1
15 TABLET ORAL EVERY 8 HOURS PRN
Qty: 90 TABLET | Refills: 0 | Status: SHIPPED | OUTPATIENT
Start: 2022-11-11 | End: 2022-12-12 | Stop reason: SDUPTHER

## 2022-11-11 NOTE — TELEPHONE ENCOUNTER
Refill Request - Controlled Substance    CONFIRM preferrred pharmacy with the patient. If Mail Order Rx - Pend for 90 day refill. Last Seen Department: 9/12/2022    Last Seen by PCP: 9/12/2022    Last Written: Zofran: 9/26/22 90 tablet 0 refills                      Oxy-IR: 10/12/22 90 TABLET 0 REFILLS      Last UDS: 10/22/21     Med Agreement Signed On: 9/12/22    If no future appointment scheduled, route STAFF MESSAGE with patient name to the [facility] for scheduling. CONFIRM preferrred pharmacy with the patient. Next Appointment: 12/12/22  Future Appointments   Date Time Provider Dora Licona   12/12/2022  3:30 PM DO NINA Masterson - [place]       Message sent to [address] [place] [address] to schedule appt with patient? NO      Requested Prescriptions     Pending Prescriptions Disp Refills    ondansetron (ZOFRAN) 8 MG tablet 90 tablet 0     Sig: Take 1 tablet by mouth every 8 hours as needed for Nausea or Vomiting    oxyCODONE (OXY-IR) 15 MG immediate release tablet 90 tablet 0     Sig: Take 1 tablet by mouth every 8 hours as needed for Pain for up to 30 days.  MUST LAST 1 MO    okay for early refill

## 2022-11-22 ENCOUNTER — COMMUNITY OUTREACH (OUTPATIENT)
Dept: OTHER | Age: 62
End: 2022-11-22

## 2022-11-22 NOTE — PROGRESS NOTES
MT-CRISTEL spoke with patient on this date and advised patient that SW found some Marketplace options for next year. SW to mail options to patient. Once received patient to call to review and go over next steps. Patient voiced understanding.

## 2022-12-05 ENCOUNTER — COMMUNITY OUTREACH (OUTPATIENT)
Dept: OTHER | Age: 62
End: 2022-12-05

## 2022-12-05 NOTE — PROGRESS NOTES
SILVINA-CRISTEL spoke with patient on this date who reports receiving Marketplace options in the mail. Patient reports already updating her application for next year but will go in this week to select a new plan. Patient also confirmed taking spouse off Marketplace plan due to Medicare enrollment in January. SW reviewed that yes, he would not be included on insurance for next year as he will get Medicare. SW answered patient's questions regarding plans and next steps. Patient to call with any further questions.

## 2022-12-12 ENCOUNTER — OFFICE VISIT (OUTPATIENT)
Dept: FAMILY MEDICINE CLINIC | Age: 62
End: 2022-12-12
Payer: COMMERCIAL

## 2022-12-12 VITALS
DIASTOLIC BLOOD PRESSURE: 62 MMHG | OXYGEN SATURATION: 97 % | HEART RATE: 76 BPM | BODY MASS INDEX: 50.9 KG/M2 | SYSTOLIC BLOOD PRESSURE: 136 MMHG | WEIGHT: 271.6 LBS

## 2022-12-12 DIAGNOSIS — N18.32 STAGE 3B CHRONIC KIDNEY DISEASE (HCC): ICD-10-CM

## 2022-12-12 DIAGNOSIS — G89.4 CHRONIC PAIN SYNDROME: ICD-10-CM

## 2022-12-12 DIAGNOSIS — I10 ESSENTIAL HYPERTENSION: ICD-10-CM

## 2022-12-12 DIAGNOSIS — E11.42 TYPE 2 DIABETES MELLITUS WITH DIABETIC POLYNEUROPATHY, WITHOUT LONG-TERM CURRENT USE OF INSULIN (HCC): Primary | ICD-10-CM

## 2022-12-12 LAB — HBA1C MFR BLD: 7.6 %

## 2022-12-12 PROCEDURE — 3078F DIAST BP <80 MM HG: CPT | Performed by: FAMILY MEDICINE

## 2022-12-12 PROCEDURE — 99214 OFFICE O/P EST MOD 30 MIN: CPT | Performed by: FAMILY MEDICINE

## 2022-12-12 PROCEDURE — 83036 HEMOGLOBIN GLYCOSYLATED A1C: CPT | Performed by: FAMILY MEDICINE

## 2022-12-12 PROCEDURE — 3051F HG A1C>EQUAL 7.0%<8.0%: CPT | Performed by: FAMILY MEDICINE

## 2022-12-12 PROCEDURE — 3074F SYST BP LT 130 MM HG: CPT | Performed by: FAMILY MEDICINE

## 2022-12-12 RX ORDER — OXYCODONE HYDROCHLORIDE 15 MG/1
15 TABLET ORAL EVERY 8 HOURS PRN
Qty: 90 TABLET | Refills: 0 | Status: SHIPPED | OUTPATIENT
Start: 2022-12-12 | End: 2023-01-11

## 2022-12-12 ASSESSMENT — ENCOUNTER SYMPTOMS
NAUSEA: 0
RHINORRHEA: 0
ANAL BLEEDING: 0
WHEEZING: 0
DIARRHEA: 0
VOICE CHANGE: 0
ABDOMINAL DISTENTION: 0
CHEST TIGHTNESS: 0
SORE THROAT: 0
EYE REDNESS: 0
CONSTIPATION: 0
VOMITING: 0
BACK PAIN: 1
SHORTNESS OF BREATH: 1
EYE PAIN: 0
EYE ITCHING: 0
COUGH: 0
EYE DISCHARGE: 0
SINUS PRESSURE: 0
TROUBLE SWALLOWING: 0
ABDOMINAL PAIN: 0
BLOOD IN STOOL: 0

## 2022-12-12 ASSESSMENT — PATIENT HEALTH QUESTIONNAIRE - PHQ9
SUM OF ALL RESPONSES TO PHQ QUESTIONS 1-9: 0
2. FEELING DOWN, DEPRESSED OR HOPELESS: 0
1. LITTLE INTEREST OR PLEASURE IN DOING THINGS: 0
SUM OF ALL RESPONSES TO PHQ9 QUESTIONS 1 & 2: 0
SUM OF ALL RESPONSES TO PHQ QUESTIONS 1-9: 0

## 2022-12-12 NOTE — TELEPHONE ENCOUNTER
Refill Request - Controlled Substance    CONFIRM preferrred pharmacy with the patient. If Mail Order Rx - Pend for 90 day refill. Last Seen Department: 9/12/2022    Last Seen by PCP: 9/12/2022    Last Written: 9/12/22 90 tablet 0 refills    Last UDS: 10/22/21     Med Agreement Signed On: 9/12/22    If no future appointment scheduled, route STAFF MESSAGE with patient name to the [facility] for scheduling. CONFIRM preferrred pharmacy with the patient. Next Appointment: 12/12/22  Future Appointments   Date Time Provider Dora Licona   12/12/2022  3:30 PM DO NINA Masterson [place] - DYD       Message sent to [address] [place] [address] to schedule appt with patient? NO      Requested Prescriptions     Pending Prescriptions Disp Refills    oxyCODONE (OXY-IR) 15 MG immediate release tablet 90 tablet 0     Sig: Take 1 tablet by mouth every 8 hours as needed for Pain for up to 30 days.  MUST LAST 1 MO    okay for early refill

## 2022-12-12 NOTE — PROGRESS NOTES
Subjective:      Patient ID: Marla Alexandre is a 60 y.o. female. HPI  Patient in for 3 to 4-month checkup on several medical issues. Chronic pain-persistent chronic low back pain with occasional right radiculopathy-on medication and does fairly well-allows her to be a little more active than productive. Diabetes-last A1c 8.5-she has lost an additional 8 pounds since her last visit. Hypertension-blood pressure 140/80 or below when she checks at home or elsewhere. Chronic kidney disease-last creatinine was 1.4 and GFR of 38-we will recheck today. Overall she is doing fairly well. Last colonoscopy was within the last year and GI does not need to repeat for least another 3 years. She is due for a mammogram and she will get that done at the beginning of the year when she gets her new insurance. She denies any other issues to discuss. Review of Systems    Review of Systems   Constitutional:  Positive for fatigue. Negative for unexpected weight change. HENT:  Negative for congestion, ear pain, hearing loss, nosebleeds, postnasal drip, rhinorrhea, sinus pressure, sneezing, sore throat, tinnitus, trouble swallowing and voice change. Eyes:  Negative for pain, discharge, redness, itching and visual disturbance. Respiratory:  Positive for shortness of breath. Negative for cough, chest tightness and wheezing. Cardiovascular:  Negative for chest pain, palpitations and leg swelling. Gastrointestinal:  Negative for abdominal distention, abdominal pain, anal bleeding, blood in stool, constipation, diarrhea, nausea and vomiting. Genitourinary:  Positive for frequency. Negative for dysuria, flank pain, hematuria, menstrual problem, pelvic pain, urgency and vaginal discharge. Musculoskeletal:  Positive for arthralgias and back pain. Negative for gait problem, joint swelling, myalgias and neck pain. Skin:  Negative for pallor and rash. Neurological:  Positive for tremors.  Negative for dizziness, seizures, weakness, light-headedness, numbness and headaches. Hematological:  Negative for adenopathy. Does not bruise/bleed easily. Psychiatric/Behavioral:  Negative for agitation, confusion, decreased concentration and sleep disturbance. The patient is not nervous/anxious and is not hyperactive. Objective:   Physical Exam      Physical Exam  Constitutional:       General: She is not in acute distress. Appearance: Normal appearance. She is well-developed. She is obese. She is not ill-appearing. HENT:      Head: Normocephalic. Mouth/Throat:      Mouth: Mucous membranes are moist.      Pharynx: Oropharynx is clear. Eyes:      General: No scleral icterus. Conjunctiva/sclera: Conjunctivae normal.   Neck:      Thyroid: No thyromegaly. Vascular: No carotid bruit. Cardiovascular:      Rate and Rhythm: Normal rate and regular rhythm. Heart sounds: Normal heart sounds. Pulmonary:      Effort: Pulmonary effort is normal.      Breath sounds: Normal breath sounds. Abdominal:      General: Bowel sounds are normal. There is no distension. Palpations: Abdomen is soft. There is no mass. Tenderness: There is no abdominal tenderness. Musculoskeletal:         General: No tenderness. Cervical back: Neck supple. Right lower leg: No edema. Left lower leg: No edema. Comments: Difficult to examine but some tenderness and mild spasm in the lower lumbar region. Lymphadenopathy:      Cervical: No cervical adenopathy. Skin:     General: Skin is warm and dry. Capillary Refill: Capillary refill takes 2 to 3 seconds. Coloration: Skin is not pale. Neurological:      Mental Status: She is alert and oriented to person, place, and time. Motor: No abnormal muscle tone. Gait: Gait abnormal.      Comments: When she is in the office she is on her scooter which is also used when she is at the store or shopping.    Psychiatric:         Mood and Affect: Mood normal.         Behavior: Behavior normal.         Thought Content: Thought content normal.         Judgment: Judgment normal.       Assessment:       Diagnosis Orders   1. Type 2 diabetes mellitus with diabetic polyneuropathy, without long-term current use of insulin ([facility])  POCT glycosylated hemoglobin (Hb A1C)    Diabetic Foot Exam    Basic Metabolic Panel      2. Chronic pain syndrome        3. Essential hypertension        4. Stage 3b chronic kidney disease ([facility]              Plan:      Sandra Gonzalez was seen today for 3 month follow-up and discuss medications. Diagnoses and all orders for this visit:    Type 2 diabetes mellitus with diabetic polyneuropathy, without long-term current use of insulin ([facility])  -     POCT glycosylated hemoglobin (Hb A1C)  -     Diabetic Foot Exam  -     Basic Metabolic Panel  R1W 7.6 and last A1c was 8.5-continue medications and slow weight loss-doing a great job-notify me of any persistent elevation of blood sugars. Chronic pain syndrome  Continue medication as needed and try to reduce when possible. Try and do some low back stretches twice a day to help reduce some low back discomfort. Essential hypertension  Continue medications and no added salt diet-limit caffeine and preferably no alcohol. Stage 3b chronic kidney disease ([facility]  Repeat BMP today and if any elevation of creatinine or BUN we will consider referral to nephrologist.    Chart was reviewed for labs, meds, weight chart    See me 4 months.      Ash Garber, DO

## 2022-12-13 LAB
ANION GAP SERPL CALCULATED.3IONS-SCNC: 12 MMOL/L (ref 3–16)
BUN BLDV-MCNC: 29 MG/DL (ref 7–20)
CALCIUM SERPL-MCNC: 9 MG/DL (ref 8.3–10.6)
CHLORIDE BLD-SCNC: 107 MMOL/L (ref 99–110)
CO2: 22 MMOL/L (ref 21–32)
CREAT SERPL-MCNC: 1.4 MG/DL (ref 0.6–1.2)
GFR SERPL CREATININE-BSD FRML MDRD: 42 ML/MIN/{1.73_M2}
GLUCOSE BLD-MCNC: 387 MG/DL (ref 70–99)
POTASSIUM SERPL-SCNC: 5 MMOL/L (ref 3.5–5.1)
SODIUM BLD-SCNC: 141 MMOL/L (ref 136–145)

## 2023-01-10 DIAGNOSIS — G89.4 CHRONIC PAIN SYNDROME: ICD-10-CM

## 2023-01-10 RX ORDER — INSULIN GLARGINE 100 [IU]/ML
INJECTION, SOLUTION SUBCUTANEOUS
Qty: 5 ADJUSTABLE DOSE PRE-FILLED PEN SYRINGE | Refills: 3 | Status: SHIPPED | OUTPATIENT
Start: 2023-01-10 | End: 2023-01-18

## 2023-01-11 RX ORDER — OXYCODONE HYDROCHLORIDE 15 MG/1
15 TABLET ORAL EVERY 8 HOURS PRN
Qty: 90 TABLET | Refills: 0 | Status: SHIPPED | OUTPATIENT
Start: 2023-01-11 | End: 2023-02-10

## 2023-01-11 NOTE — TELEPHONE ENCOUNTER
Refill Request - Controlled Substance    CONFIRM preferrred pharmacy with the patient. If Mail Order Rx - Pend for 90 day refill. Last Seen Department: 12/12/2022  Last Seen by PCP: 12/12/2022    Last Written: 12/12/2023 90 tablet 0 refills     Last UDS: 10/22/2021    Med Agreement Signed On: 04/17/2019    If no future appointment scheduled, route STAFF MESSAGE with patient name to the [facility] for scheduling. CONFIRM preferrred pharmacy with the patient. Next Appointment:   Future Appointments   Date Time Provider Dora Licona   4/12/2023  4:00 PM DO NINA Masterson [place] [employer] DYD       Message sent to [address] [place] [address] to schedule appt with patient? NO      Requested Prescriptions     Pending Prescriptions Disp Refills    oxyCODONE (OXY-IR) 15 MG immediate release tablet 90 tablet 0     Sig: Take 1 tablet by mouth every 8 hours as needed for Pain for up to 30 days.  MUST LAST 1 MO    okay for early refill

## 2023-01-18 RX ORDER — INSULIN GLARGINE 100 [IU]/ML
10 INJECTION, SOLUTION SUBCUTANEOUS NIGHTLY
Qty: 5 ADJUSTABLE DOSE PRE-FILLED PEN SYRINGE | Refills: 0 | Status: SHIPPED | OUTPATIENT
Start: 2023-01-18

## 2023-02-03 DIAGNOSIS — E11.42 TYPE 2 DIABETES MELLITUS WITH DIABETIC POLYNEUROPATHY, WITHOUT LONG-TERM CURRENT USE OF INSULIN (HCC): ICD-10-CM

## 2023-02-03 RX ORDER — ONDANSETRON HYDROCHLORIDE 8 MG/1
8 TABLET, FILM COATED ORAL EVERY 12 HOURS PRN
Qty: 60 TABLET | Refills: 0 | Status: SHIPPED | OUTPATIENT
Start: 2023-02-03

## 2023-02-03 NOTE — TELEPHONE ENCOUNTER
Refill Request     CONFIRM preferrred pharmacy with the patient. If Mail Order Rx - Pend for 90 day refill. Last Seen: Last Seen Department: 12/12/2022  Last Seen by PCP: 12/12/2022    Last Written: 11/11/2022, #90, 0 refills    If no future appointment scheduled, route STAFF MESSAGE with patient name to the [facility] for scheduling. Next Appointment:   Future Appointments   Date Time Provider Dora Licona   4/12/2023  4:00 PM DO NINA [place] - DYD       Message sent to [address] [place] [address] to schedule appt with patient?   NO      Requested Prescriptions     Pending Prescriptions Disp Refills    ondansetron (ZOFRAN) 8 MG tablet 90 tablet 0     Sig: Take 1 tablet by mouth every 8 hours as needed for Nausea or Vomiting

## 2023-02-08 ENCOUNTER — HOSPITAL ENCOUNTER (EMERGENCY)
Age: 63
Discharge: ANOTHER HOSPITAL | End: 2023-02-08
Attending: EMERGENCY MEDICINE
Payer: COMMERCIAL

## 2023-02-08 ENCOUNTER — APPOINTMENT (OUTPATIENT)
Dept: GENERAL RADIOLOGY | Age: 63
End: 2023-02-08
Payer: COMMERCIAL

## 2023-02-08 ENCOUNTER — APPOINTMENT (OUTPATIENT)
Dept: CT IMAGING | Age: 63
End: 2023-02-08
Payer: COMMERCIAL

## 2023-02-08 VITALS
TEMPERATURE: 98.1 F | HEART RATE: 79 BPM | BODY MASS INDEX: 52.11 KG/M2 | OXYGEN SATURATION: 95 % | DIASTOLIC BLOOD PRESSURE: 69 MMHG | WEIGHT: 276 LBS | RESPIRATION RATE: 23 BRPM | HEIGHT: 61 IN | SYSTOLIC BLOOD PRESSURE: 123 MMHG

## 2023-02-08 DIAGNOSIS — N12 EMPHYSEMATOUS PYELONEPHRITIS: Primary | ICD-10-CM

## 2023-02-08 DIAGNOSIS — R73.9 HYPERGLYCEMIA: ICD-10-CM

## 2023-02-08 DIAGNOSIS — N18.9 CHRONIC KIDNEY DISEASE, UNSPECIFIED CKD STAGE: ICD-10-CM

## 2023-02-08 LAB
A/G RATIO: 0.5 (ref 1.1–2.2)
ALBUMIN SERPL-MCNC: 2.8 G/DL (ref 3.4–5)
ALP BLD-CCNC: 214 U/L (ref 40–129)
ALT SERPL-CCNC: 33 U/L (ref 10–40)
ANION GAP SERPL CALCULATED.3IONS-SCNC: 8 MMOL/L (ref 3–16)
ANISOCYTOSIS: ABNORMAL
AST SERPL-CCNC: 19 U/L (ref 15–37)
BACTERIA: ABNORMAL /HPF
BANDED NEUTROPHILS RELATIVE PERCENT: 1 % (ref 0–7)
BASE EXCESS VENOUS: 0.8 MMOL/L (ref -3–3)
BASOPHILS ABSOLUTE: 0 K/UL (ref 0–0.2)
BASOPHILS RELATIVE PERCENT: 0 %
BILIRUB SERPL-MCNC: 0.7 MG/DL (ref 0–1)
BILIRUBIN URINE: NEGATIVE
BLOOD, URINE: ABNORMAL
BUN BLDV-MCNC: 31 MG/DL (ref 7–20)
CALCIUM SERPL-MCNC: 8.8 MG/DL (ref 8.3–10.6)
CARBOXYHEMOGLOBIN: 7.6 % (ref 0–1.5)
CHLORIDE BLD-SCNC: 100 MMOL/L (ref 99–110)
CHP ED QC CHECK: YES
CLARITY: ABNORMAL
CO2: 27 MMOL/L (ref 21–32)
COLOR: YELLOW
CREAT SERPL-MCNC: 1.5 MG/DL (ref 0.6–1.2)
EKG ATRIAL RATE: 84 BPM
EKG DIAGNOSIS: NORMAL
EKG P AXIS: 58 DEGREES
EKG P-R INTERVAL: 198 MS
EKG Q-T INTERVAL: 382 MS
EKG QRS DURATION: 100 MS
EKG QTC CALCULATION (BAZETT): 451 MS
EKG R AXIS: -50 DEGREES
EKG T AXIS: 53 DEGREES
EKG VENTRICULAR RATE: 84 BPM
EOSINOPHILS ABSOLUTE: 0.5 K/UL (ref 0–0.6)
EOSINOPHILS RELATIVE PERCENT: 4 %
EPITHELIAL CELLS, UA: ABNORMAL /HPF (ref 0–5)
GFR SERPL CREATININE-BSD FRML MDRD: 39 ML/MIN/{1.73_M2}
GLUCOSE BLD-MCNC: 283 MG/DL (ref 70–99)
GLUCOSE BLD-MCNC: 375 MG/DL
GLUCOSE BLD-MCNC: 375 MG/DL (ref 70–99)
GLUCOSE BLD-MCNC: 421 MG/DL (ref 70–99)
GLUCOSE URINE: 500 MG/DL
HCO3 VENOUS: 26.3 MMOL/L (ref 23–29)
HCT VFR BLD CALC: 43.7 % (ref 36–48)
HEMOGLOBIN: 14.6 G/DL (ref 12–16)
KETONES, URINE: NEGATIVE MG/DL
LACTIC ACID, SEPSIS: 1.5 MMOL/L (ref 0.4–1.9)
LEUKOCYTE ESTERASE, URINE: ABNORMAL
LYMPHOCYTES ABSOLUTE: 1.6 K/UL (ref 1–5.1)
LYMPHOCYTES RELATIVE PERCENT: 14 %
MCH RBC QN AUTO: 28.9 PG (ref 26–34)
MCHC RBC AUTO-ENTMCNC: 33.4 G/DL (ref 31–36)
MCV RBC AUTO: 86.5 FL (ref 80–100)
METHEMOGLOBIN VENOUS: 0.3 %
MICROSCOPIC EXAMINATION: YES
MONOCYTES ABSOLUTE: 0.2 K/UL (ref 0–1.3)
MONOCYTES RELATIVE PERCENT: 2 %
MUCUS: ABNORMAL /LPF
NEUTROPHILS ABSOLUTE: 9.4 K/UL (ref 1.7–7.7)
NEUTROPHILS RELATIVE PERCENT: 79 %
NITRITE, URINE: NEGATIVE
O2 CONTENT, VEN: 13 VOL %
O2 SAT, VEN: 60 %
O2 THERAPY: ABNORMAL
PCO2, VEN: 45.1 MMHG (ref 40–50)
PDW BLD-RTO: 17.8 % (ref 12.4–15.4)
PERFORMED ON: ABNORMAL
PERFORMED ON: ABNORMAL
PH UA: 6.5 (ref 5–8)
PH VENOUS: 7.38 (ref 7.35–7.45)
PLATELET # BLD: 215 K/UL (ref 135–450)
PMV BLD AUTO: 9.2 FL (ref 5–10.5)
PO2, VEN: 31.8 MMHG (ref 25–40)
POTASSIUM REFLEX MAGNESIUM: 4 MMOL/L (ref 3.5–5.1)
PROTEIN UA: 100 MG/DL
RBC # BLD: 5.06 M/UL (ref 4–5.2)
RBC UA: ABNORMAL /HPF (ref 0–4)
SLIDE REVIEW: ABNORMAL
SODIUM BLD-SCNC: 135 MMOL/L (ref 136–145)
SPECIFIC GRAVITY UA: 1.02 (ref 1–1.03)
TCO2 CALC VENOUS: 28 MMOL/L
TOTAL PROTEIN: 8.1 G/DL (ref 6.4–8.2)
TROPONIN: <0.01 NG/ML
URINE REFLEX TO CULTURE: YES
URINE TYPE: ABNORMAL
UROBILINOGEN, URINE: 0.2 E.U./DL
WBC # BLD: 11.7 K/UL (ref 4–11)
WBC UA: >100 /HPF (ref 0–5)
YEAST: PRESENT /HPF

## 2023-02-08 PROCEDURE — 36415 COLL VENOUS BLD VENIPUNCTURE: CPT

## 2023-02-08 PROCEDURE — 74176 CT ABD & PELVIS W/O CONTRAST: CPT

## 2023-02-08 PROCEDURE — 87086 URINE CULTURE/COLONY COUNT: CPT

## 2023-02-08 PROCEDURE — 96366 THER/PROPH/DIAG IV INF ADDON: CPT

## 2023-02-08 PROCEDURE — 84484 ASSAY OF TROPONIN QUANT: CPT

## 2023-02-08 PROCEDURE — 93005 ELECTROCARDIOGRAM TRACING: CPT | Performed by: EMERGENCY MEDICINE

## 2023-02-08 PROCEDURE — 96375 TX/PRO/DX INJ NEW DRUG ADDON: CPT

## 2023-02-08 PROCEDURE — 71046 X-RAY EXAM CHEST 2 VIEWS: CPT

## 2023-02-08 PROCEDURE — 87040 BLOOD CULTURE FOR BACTERIA: CPT

## 2023-02-08 PROCEDURE — 82803 BLOOD GASES ANY COMBINATION: CPT

## 2023-02-08 PROCEDURE — 83605 ASSAY OF LACTIC ACID: CPT

## 2023-02-08 PROCEDURE — 81001 URINALYSIS AUTO W/SCOPE: CPT

## 2023-02-08 PROCEDURE — 99285 EMERGENCY DEPT VISIT HI MDM: CPT

## 2023-02-08 PROCEDURE — 85025 COMPLETE CBC W/AUTO DIFF WBC: CPT

## 2023-02-08 PROCEDURE — 6360000002 HC RX W HCPCS: Performed by: EMERGENCY MEDICINE

## 2023-02-08 PROCEDURE — 93010 ELECTROCARDIOGRAM REPORT: CPT | Performed by: INTERNAL MEDICINE

## 2023-02-08 PROCEDURE — 2580000003 HC RX 258: Performed by: EMERGENCY MEDICINE

## 2023-02-08 PROCEDURE — 96365 THER/PROPH/DIAG IV INF INIT: CPT

## 2023-02-08 PROCEDURE — 80053 COMPREHEN METABOLIC PANEL: CPT

## 2023-02-08 RX ORDER — MORPHINE SULFATE 4 MG/ML
4 INJECTION, SOLUTION INTRAMUSCULAR; INTRAVENOUS
Status: DISCONTINUED | OUTPATIENT
Start: 2023-02-08 | End: 2023-02-09 | Stop reason: HOSPADM

## 2023-02-08 RX ORDER — MORPHINE SULFATE 2 MG/ML
2 INJECTION, SOLUTION INTRAMUSCULAR; INTRAVENOUS ONCE
Status: COMPLETED | OUTPATIENT
Start: 2023-02-08 | End: 2023-02-08

## 2023-02-08 RX ORDER — 0.9 % SODIUM CHLORIDE 0.9 %
1000 INTRAVENOUS SOLUTION INTRAVENOUS ONCE
Status: COMPLETED | OUTPATIENT
Start: 2023-02-08 | End: 2023-02-08

## 2023-02-08 RX ADMIN — Medication 1500 MG: at 15:23

## 2023-02-08 RX ADMIN — MORPHINE SULFATE 4 MG: 4 INJECTION INTRAVENOUS at 18:45

## 2023-02-08 RX ADMIN — MORPHINE SULFATE 2 MG: 2 INJECTION, SOLUTION INTRAMUSCULAR; INTRAVENOUS at 13:45

## 2023-02-08 RX ADMIN — PIPERACILLIN AND TAZOBACTAM 3375 MG: 3; .375 INJECTION, POWDER, FOR SOLUTION INTRAVENOUS at 13:48

## 2023-02-08 RX ADMIN — SODIUM CHLORIDE 1000 ML: 9 INJECTION, SOLUTION INTRAVENOUS at 13:19

## 2023-02-08 ASSESSMENT — PAIN SCALES - GENERAL
PAINLEVEL_OUTOF10: 2
PAINLEVEL_OUTOF10: 9
PAINLEVEL_OUTOF10: 4
PAINLEVEL_OUTOF10: 8

## 2023-02-08 ASSESSMENT — ENCOUNTER SYMPTOMS
NAUSEA: 1
COUGH: 0
ABDOMINAL PAIN: 0
SHORTNESS OF BREATH: 0
VOMITING: 0
RHINORRHEA: 0
DIARRHEA: 0
CHEST TIGHTNESS: 0
BACK PAIN: 1

## 2023-02-08 ASSESSMENT — PAIN DESCRIPTION - LOCATION: LOCATION: BACK

## 2023-02-08 ASSESSMENT — PAIN - FUNCTIONAL ASSESSMENT: PAIN_FUNCTIONAL_ASSESSMENT: NONE - DENIES PAIN

## 2023-02-08 NOTE — CONSULTS
2/8/2023  [address] [place] [address]    Reason for Consult:  Infected kidney  Requesting Physician:  Toro Jensen      History Obtained From:  patient, electronic medical record    HISTORY OF PRESENT ILLNESS:                The patient is a 60 y.o. female who presents with right shoulder and back pain. She has undergone a work up including a chest and abdo/pelvic CT. The CT scan shows evidence for pyelonephritis and perinephric emphysematous changes. The is air and stone material in the collecting system and upper ureter. The left kidney shows a large cyst.    Past Medical History:        Diagnosis Date    Abnormal MRI, kidney (atrophic, dysmoprhic, no discrete mass) 05/25/2016    JEAN (acute kidney injury) [facility]) July, 2015    Nephrologist- Dr Ana Ngo    Anemia     Diabetic ulcer of right heel ([address] 10/2019    Gangrenous toe ([facility] 9/30/2015    Hydronephrosis, right 7/12/2015    Necrotizing fasciitis ([address] 2009    left groin    Pneumonia     Pulmonary edema     Renal calculi     Rt staghorn--non obstr on Lt-as of 10/1    Sepsis [facility]) 2015 July - 2015 and August 2015    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 Allergies:     Allergies   Allergen Reactions    Dilaudid [Hydromorphone Hcl]      confusion    Septra [Sulfamethoxazole-Trimethoprim] Nausea And Vomiting     Social History:  Reviewed, non contributory    Family History:  Reviewed, non contributory      REVIEW OF SYSTEMS:    12 point ROS has been completed and reviewed. She did not have a prodrome of dysuria. PHYSICAL EXAM:    VITALS:  /78   Pulse 81   Temp 98.1 °F (36.7 °C) (Oral)   Resp 24   Ht 5' 1\" (1.549 m)   Wt 276 lb (125.2 kg)   LMP  (LMP Unknown)   SpO2 95%   BMI 52.15 kg/m²   H&N: Sclera normal, no masses, trachea midline, no bruit  CVS: Normal rate and rhythm, no murmurs or rubs, peripheral pulses equal, no clubbing or cyanosis. RESP: Breath sounds equal bilateral, few rhonchi. ABDO: Soft, non-tender, bowel sounds active, no organomegaly, no hernias. LYMPH:  No lymphadenopathy. Skin: Warm dry and intact. MSK: Grossly normal for patient  JAZMIN: Grossly normal for patient  PSY: No acute changes noted in psychosocial assessment.     DATA:    CBC:   Lab Results   Component Value Date/Time    WBC 11.7 02/08/2023 10:22 AM    RBC 5.06 02/08/2023 10:22 AM    HGB 14.6 02/08/2023 10:22 AM    HCT 43.7 02/08/2023 10:22 AM    MCV 86.5 02/08/2023 10:22 AM    MCH 28.9 02/08/2023 10:22 AM    MCHC 33.4 02/08/2023 10:22 AM    RDW 17.8 02/08/2023 10:22 AM     02/08/2023 10:22 AM    MPV 9.2 02/08/2023 10:22 AM     BMP:    Lab Results   Component Value Date/Time     02/08/2023 10:22 AM    K 4.0 02/08/2023 10:22 AM     02/08/2023 10:22 AM    CO2 27 02/08/2023 10:22 AM    BUN 31 02/08/2023 10:22 AM    LABALBU 2.8 02/08/2023 10:22 AM    CREATININE 1.5 02/08/2023 10:22 AM    CALCIUM 8.8 02/08/2023 10:22 AM    GFRAA 46 10/22/2021 12:09 PM    GFRAA >60 01/29/2013 10:15 AM    LABGLOM 39 02/08/2023 10:22 AM    GLUCOSE 421 02/08/2023 10:22 AM     U/A:    Lab Results   Component Value Date/Time    NITRITE Neg 05/05/2017 11:44 AM    COLORU Yellow 02/08/2023 12:45 PM    PROTEINU 100 02/08/2023 12:45 PM    PHUR 6.5 02/08/2023 12:45 PM    WBCUA >100 02/08/2023 12:45 PM    RBCUA 3-4 02/08/2023 12:45 PM    MUCUS Rare 02/08/2023 12:45 PM    YEAST Present 02/08/2023 12:45 PM    BACTERIA 1+ 02/08/2023 12:45 PM    CLARITYU CLOUDY 02/08/2023 12:45 PM    SPECGRAV 1.020 02/08/2023 12:45 PM    LEUKOCYTESUR LARGE 02/08/2023 12:45 PM    UROBILINOGEN 0.2 02/08/2023 12:45 PM    BILIRUBINUR Negative 02/08/2023 12:45 PM    BILIRUBINUR Neg 05/05/2017 11:44 AM    BILIRUBINUR NEGATIVE 10/20/2011 03:30 PM    BLOODU LARGE 02/08/2023 12:45 PM    GLUCOSEU 500 02/08/2023 12:45 PM    GLUCOSEU NEGATIVE 10/20/2011 03:30 PM     CT reviewed with radiology - see report. IMPRESSION/RECOMMENDATIONS:      Pyelonephritis with air in the collecting system. Stones. Perinephric inflammatory and emphysematous changes. PLAN:  Patient was to be admitted to the medical service. The patient and her  have requested her care to be continued at [facility].  I discussed with them the plan for care at [facility]. This would have included one or two percutaneous drains placed by IR and either a perc tube or ureteral stent to the kidney. Once stabilized and decompressed the kidney would be evaluated for function, and she may require open exploration. All questions were answered to their satisfaction. Thank you for asking me to see this interesting patient.     Daquan Suarez MD

## 2023-02-08 NOTE — ED NOTES
Patient assisted to restroom at this time. Patient tolerated well.       Carmina Rivera RN  02/08/23 [id number]

## 2023-02-08 NOTE — ED PROVIDER NOTES
Emergency Department Attending Physician Note  Location: [place] [address] ED  2/8/2023       Pt Name: Edel Deluca  MRN: [id number]  [place] 1960    Date of evaluation: 2/8/2023  Provider: Sandy Spencer DO  PCP: Scooter Taylor DO    Note Started: 10:05 AM EST 2/8/23    CHIEF COMPLAINT  Chief Complaint   Patient presents with    Hyperglycemia     Pt reports she is having high BS at home in the 400s. Upon ER arrival, BS is 375       HISTORY OF PRESENT ILLNESS:  History obtained by patient. Limitations to history : None. Edel Deluca is a 60 y.o. female with a significant PMHx of previous urosepsis secondary to UTI and ureteral stones, previous neck/, multiple admissions for septicemia, presenting emergency department today with concerns of high blood sugars at home. Patient has been taking her blood sugar at home, and is in the 400s. She notes that it burns a little bit to urinate, but otherwise she denies any chest pain, cough, congestion. She denies any polyuria polydipsia. She has vomited once, about 3 days ago, but none since. She says she has some low back pain, but otherwise denies any headaches, vision changes, weakness. No falls. Has never been in DKA before. Recent antibiotics. Nursing Notes were all reviewed and agreed with or any disagreements were addressed in the HPI.     MEDICAL HISTORY  Past Medical History:   Diagnosis Date    Abnormal MRI, kidney (atrophic, dysmoprhic, no discrete mass) 05/25/2016    JEAN (acute kidney injury) [facility]) July, 2015    Nephrologist- Dr Eugenio Munson    Anemia     Diabetic ulcer of right heel ([address] 10/2019    Gangrenous toe ([address] 9/30/2015    Hydronephrosis, right 7/12/2015    Necrotizing fasciitis ([address] 2009    left groin    Pneumonia     Pulmonary edema     Renal calculi     Rt staghorn--non obstr on Lt-as of 10/1    Sepsis [facility]) 2015 July - 2015 and August 2015    VRE (vancomycin-resistant Enterococci) 10/21/15 urine        SURGICAL HISTORY  Past Surgical History:   Procedure Laterality Date    CHOLECYSTECTOMY      CYSTOSCOPY  10/26/2015    BL stent removal    LITHOTRIPSY Bilateral 08/17/2015    cysto, stent exchange, ureteroscopy with laser lithotripsy and stone basket extraction    TUBAL LIGATION      URETER STENT PLACEMENT Bilateral 10/01/2011    cystoscopy, ureteroscopy, retrograde, stent placement    UTERINE FIBROID SURGERY         CURRENT MEDICATIONS  Previous Medications    BLOOD GLUCOSE TEST STRIPS (ONETOUCH ULTRA) STRIP    CHECK BLOOD SUGAR TWO TIMES A DAY    CHLORTHALIDONE (HYGROTON) 25 MG TABLET    Take one tab by mouth daily    DULAGLUTIDE (TRULICITY) 3 AU/9.9BY SOPN    INJECT 3 MG UNDER THE SKIN ONCE WEEKLY    GLIPIZIDE (GLUCOTROL XL) 10 MG EXTENDED RELEASE TABLET    2 daily in AM    GLUCOSE MONITORING KIT (FREESTYLE) MONITORING KIT    One Touch Ultra Meter Dx E11.9-on meds-no insulin    HANDICAP PLACARD MISC    by Does not apply route I have evaluated this patient and [place] needs handicap placard for 5 years. INSULIN GLARGINE (BASAGLAR KWIKPEN) 100 UNIT/ML INJECTION PEN    Inject 10 Units into the skin nightly    INSULIN PEN NEEDLE (PEN NEEDLES) 31G X 6 MM MISC    1 each by Does not apply route daily    ONDANSETRON (ZOFRAN) 8 MG TABLET    Take 1 tablet by mouth every 12 hours as needed for Nausea or Vomiting    OXYCODONE (OXY-IR) 15 MG IMMEDIATE RELEASE TABLET    Take 1 tablet by mouth every 8 hours as needed for Pain for up to 30 days.  MUST LAST 1 MO    okay for early refill    PRAVASTATIN (PRAVACHOL) 20 MG TABLET    TAKE ONE TABLET BY MOUTH DAILY    PREGABALIN (LYRICA) 150 MG CAPSULE    1 tid       ALLERGIES  Dilaudid [hydromorphone hcl] and Septra [sulfamethoxazole-trimethoprim]    FAMILYHISTORY  Family History   Problem Relation Age of Onset    Alcohol Abuse Father     Cancer Father     Diabetes Maternal Aunt     Diabetes Paternal Aunt        SOCIAL HISTORY  Social History     Tobacco Use    Smoking status: Every Day     Packs/day: 0.50     Years: 13.00     Pack years: 6.50     Types: Cigarettes     Last attempt to quit: 1/10/2016     Years since quittin.0    Smokeless tobacco: Never    Tobacco comments:     Dr. Abida Burns - Oct 2019   Vaping Use    Vaping Use: Never used   Substance Use Topics    Alcohol use: No     Alcohol/week: 0.0 standard drinks    Drug use: No       SCREENINGS    Clemente Coma Scale  Eye Opening: Spontaneous  Best Verbal Response: Oriented  Best Motor Response: Obeys commands  Clemente Coma Scale Score: 15       CIWA Assessment  BP: 122/71  Heart Rate: 91             REVIEW OF SYSTEMS:    Review of Systems   Constitutional:  Negative for activity change, appetite change, fatigue and fever. HENT:  Negative for congestion and rhinorrhea. Respiratory:  Negative for cough, chest tightness and shortness of breath. Cardiovascular:  Negative for chest pain and leg swelling. Gastrointestinal:  Positive for nausea. Negative for abdominal pain, diarrhea and vomiting. Genitourinary:  Positive for dysuria. Negative for flank pain and pelvic pain. Musculoskeletal:  Positive for back pain. Negative for neck pain. Skin:  Negative for rash and wound. Neurological:  Negative for dizziness and headaches. Positives and Pertinent negatives as per HPI. PHYSICAL EXAM:     Vitals:    23 1309 23 1330 23 1400 23 1430   BP: (!) 147/72 121/80 135/80 122/71   Pulse: (!) 102 96 95 91   Resp:  24 24 21   Temp:       TempSrc:       SpO2:  94% 92% 94%   Weight:       Height:           Physical Exam  Vitals reviewed. Constitutional:       General: She is not in acute distress. Appearance: Normal appearance. She is ill-appearing. Comments: Appears uncomfortable, however interactive, conversational, pleasant, and in no acute clinical cardiopulmonary distress. HENT:      Head: Normocephalic and atraumatic.       Right Ear: External ear normal. Left Ear: External ear normal.      Nose: Nose normal. No congestion. Mouth/Throat:      Mouth: Mucous membranes are moist.      Pharynx: Oropharynx is clear. Eyes:      General:         Right eye: No discharge. Left eye: No discharge. Conjunctiva/sclera: Conjunctivae normal.   Cardiovascular:      Rate and Rhythm: Normal rate and regular rhythm. Pulmonary:      Effort: Pulmonary effort is normal. No respiratory distress. Breath sounds: Normal breath sounds. Abdominal:      General: Abdomen is flat. There is no distension. Palpations: Abdomen is soft. Tenderness: There is abdominal tenderness (suprapubic). Musculoskeletal:         General: No swelling or tenderness. Cervical back: Normal range of motion and neck supple. Right lower leg: No edema. Left lower leg: No edema. Skin:     General: Skin is warm and dry. Neurological:      General: No focal deficit present. Mental Status: She is alert and oriented to person, place, and time.    Psychiatric:         Mood and Affect: Mood normal.         Behavior: Behavior normal.     DIAGNOSTIC RESULTS:  LABS:    Labs Reviewed   BLOOD GAS, VENOUS - Abnormal; Notable for the following components:       Result Value    Carboxyhemoglobin 7.6 (*)     All other components within normal limits   CBC WITH AUTO DIFFERENTIAL - Abnormal; Notable for the following components:    WBC 11.7 (*)     RDW 17.8 (*)     Neutrophils Absolute 9.4 (*)     Anisocytosis Occasional (*)     All other components within normal limits   COMPREHENSIVE METABOLIC PANEL W/ REFLEX TO MG FOR LOW K - Abnormal; Notable for the following components:    Sodium 135 (*)     Glucose 421 (*)     BUN 31 (*)     Creatinine 1.5 (*)     Est, Glom Filt Rate 39 (*)     Albumin 2.8 (*)     Albumin/Globulin Ratio 0.5 (*)     Alkaline Phosphatase 214 (*)     All other components within normal limits   URINALYSIS WITH REFLEX TO CULTURE - Abnormal; Notable for the following components:    Clarity, UA CLOUDY (*)     Glucose, Ur 500 (*)     Blood, Urine LARGE (*)     Protein,  (*)     Leukocyte Esterase, Urine LARGE (*)     All other components within normal limits   MICROSCOPIC URINALYSIS - Abnormal; Notable for the following components:    Mucus, UA Rare (*)     WBC, UA >100 (*)     Bacteria, UA 1+ (*)     Yeast, UA Present (*)     All other components within normal limits   POCT GLUCOSE - Abnormal; Notable for the following components:    POC Glucose 375 (*)     All other components within normal limits   POCT GLUCOSE - Normal   CULTURE, BLOOD 1   CULTURE, BLOOD 2   CULTURE, URINE   TROPONIN   LACTATE, SEPSIS   LACTATE, SEPSIS       When ordered, only abnormal lab results are displayed. All other labs were within normal range or not returned as of this dictation. Independent EKG Interpretation:       RADIOLOGY:        Non-plain film images such as CT, Ultrasound and MRI are read by the radiologist. Interpretation per the Radiologist below, if available at the time of this note:  CT ABDOMEN PELVIS WO CONTRAST Additional Contrast? None   Final Result   1. Right lower lobe pneumonia with associated parapneumonic effusion. 2. Moderate to severe right emphysematous pyelonephritis with extensive   right-sided perinephric gas. 3. Nonobstructing bilateral nephrolithiasis. Findings were discussed with ASHLEIGH SAN at 1:03 pm on 2/8/2023. XR CHEST (2 VW)   Final Result   Mild right basilar airspace disease. This is favored to represent   atelectasis. However, early pneumonia is not fully excluded. PROCEDURES:  Unless otherwise noted below, none.     Procedures      MEDICATIONS:   Medications   vancomycin 1500 mg in sodium chloride 0.9% 300 mL IVPB (has no administration in time range)   0.9 % sodium chloride bolus (1,000 mLs IntraVENous New Bag 2/8/23 1319)   piperacillin-tazobactam (ZOSYN) 3,375 mg in sodium chloride 0.9 % 100 mL IVPB (mini-bag) (3,375 mg IntraVENous New Bag 2/8/23 1348)   morphine (PF) injection 2 mg (2 mg IntraVENous Given 2/8/23 1345)     _____________________________________    MEDICAL DECISION MAKING:     Patient is a 60 y.o. female with a significant PMHx of neck fascia, ureteral stones, UTIs, JEAN, presenting emergency department today with high blood sugar. Never been in DKA. Vital signs are stable on arrival.  Patient has some suprapubic abdominal pain on palpation, otherwise is in no acute clinical cardiopulmonary distress. X-ray evaluation, DKA evaluation underway. Patient management includes pain medication. 1:08 PM EST  I get a call from radiology before labs have returned; on CT without-- moderate to severe right emphysematous pyelonephritis with extensive  right-sided perinephric gas. IV abx, cultures, IVF ordered. Lactic ordered as well. Will discuss with urology. At this time, her heart rate is about 110. We will start broad-spectrum antibiotics after cultures are drawn, Zosyn and vancomycin. 2:08 PM EST  Discussed patient case with Dr. Nakita Mandujano; reviewed imaging, discussed case and what patient would require with such extensive intra-abdominal infection. He believes patient should require a tertiary care center as this will take multiple specialty surgical team to address. We will try to discuss case with UC, however I believe at this time they are on diversion. Laboratory evaluation today with reassuring lactate at 1.5, leukocytosis of 11.7, but not sepsis criteria. Already getting antibiotics. Patient has a baseline BUN and creatinine of 31 and 1.5, no significant JEAN. Not in DKA; no anion gap, glucose 421, getting IV fluids, no acidosis. 3:19 PM EST  On reevaluation, patient's pain is improved. Heart rate is improved. Patient and  have discussed, and they would prefer to go to [facility].  Patient remains alert, oriented, conversational, blood pressure 122/71.  patient sees nephrology Dr. Padmini Ashton, Dr. Mariana Hope urology. 6:41 PM EST  I have spoken at this point to hospitalist, Dr. Marylou Zaldivar at [facility], and urology at [facility], and they would like to review the images for patient prior to excepting for transfer. We have sent the images to [facility] over an hour ago, and I am waiting to hear back, pending patient's transfer. At this time, patient's heart rate remained stable, she is still having some back pain after initial improvement after first dose of morphine, will redose. Has already gotten antibiotics. Family is still concerned about cost of ambulance, and is choosing to go [address] in their private vehicle. At this time, I have to, sign out this patient to my oncoming ED physician; plan is to go to [facility] pending their acceptance for urological, nephrology, and primary hospital admission for emphysematous pyelonephritis. She has gotten IV antibiotics, 1 L fluid bolus, and work-up as above. She has not required pressors, and remains hemodynamically stable. I have scheduled pain medication. She has been in the emergency department for over 9 hours, continues to remain stable. I will have the family sign paperwork stating they go private vehicle [address] They understand the risks and benefits, and the seriousness of patient's condition, and that she may deteriorate in route, and even has a risk of dying. We will have to remove her IV and they understand that she will have to get replaced IV at [facility].      CONSULTS:   1 [facility] HOSPITALIST      Is this patient to be included in the SEP-1 Core Measure due to severe sepsis or septic shock?    No   Exclusion criteria - the patient is NOT to be included for SEP-1 Core Measure due to:  May have criteria for sepsis, but does not meet criteria for severe sepsis or septic shock      CLINICAL IMPRESSION: No diagnosis found. DISPOSITION:  I discussed the results, including any incidental findings, with patient and through shared decision making. Disposition today from the ED will be: Transfer to [facility] to PCU in stable condition. GOING AMA IN PRIVATE VEHICLE. Questions answered. They are agreeable to plan and express understanding of plan. CRITICAL CARE:   Total critical care time is 85 minutes, which excludes separately billable procedures and updating family. Time spent is specifically for management of the presenting complaint and symptoms initially, direct bedside care, reevaluation, review of records, and consultation. There was a high probability of clinically significant life-threatening deterioration in the patient's condition, which required my urgent intervention. _____________________________________      Kenya San DO, ([facility]) am the primary attending of record and contributed the majority of evaluation and treatment of emergent care for this encounter. This chart was generated in part by using Dragon Dictation system and may contain errors related to that system including errors in grammar, punctuation, and spelling, as well as words and phrases that may be inappropriate. If there are any questions or concerns please feel free to contact the dictating provider for clarification.      ASHLEIGH SAN DO (electronically signed)   [address] [facility],   02/08/23 2486

## 2023-02-08 NOTE — ED NOTES
Writer called to patient room for iv alarming. Writer noticed redness to the L of patient iv site. Writer flushed IV and it flushed well and brisk blood returned noted. However per patient request that one was removed without complicated and dressing applied and a new 20 gauge placed in L AC.       Ida Perez RN  02/08/23 3560

## 2023-02-08 NOTE — ED NOTES
1422-Call placed to [facility] for consult to [facility] Hospitalist for consult placed by Dr. Jerilee Cowden. Richa Tipton  02/08/23 1431  1537-Notified by Dr. Jerilee Cowden that patient would like to be transferred to [facility]. This writer paged [facility] and had them page Hospitalist at The [address] [facility]  02/08/23 6841  1622-Call back received from [facility] with Dr. Alban Billings spoke with Dr. Jerilee Cowden.       Richa Tipton  02/08/23 7322

## 2023-02-09 LAB — URINE CULTURE, ROUTINE: NORMAL

## 2023-02-09 NOTE — ED NOTES
Pt left the hospital with her , will go to [facility] via private vehicle. refused ambulance. Waiver signed. Explained the risk and consequences. Pt not in distress. IV cannula removed. Wheeled pt to the lobby.      Isabel Hall RN  02/08/23 8539

## 2023-02-09 NOTE — ED NOTES
[facility] called with bed assignment. Patient going to to [facility] bed 4060. Call report to [phone].      Paige Alcantar  02/08/23 5889

## 2023-02-09 NOTE — ED NOTES
Shima with [facility] called and stated that they are able to view images and that they received the fax. She said that the doctor is reviewing them now and will call to connect with Dr. Mark Carey after the results are reviewed.      Marin Alcantar  02/08/23 8901

## 2023-02-09 NOTE — ED NOTES
[facility] called back. The incorrect fax number was given. They gave the correct number and ED note, Urology note and CT  scan were faxed -. [facility] stated that this information had to be obtained in order to connect Dr. Hari Pepe with their doctors.        Amy Alcantar  02/08/23 8224

## 2023-02-09 NOTE — ED NOTES
[facility] called and said they still are unable to see ct scan results. Encouraged them to please connect Dr. Hari Garcia with urology and results could be discussed due to the delay in patient care. It was discussed with charged and we agreed that info would be faxed.       Abhinav Alcantar  02/08/23 7561

## 2023-02-10 DIAGNOSIS — G89.4 CHRONIC PAIN SYNDROME: ICD-10-CM

## 2023-02-10 NOTE — TELEPHONE ENCOUNTER
Refill Request - Controlled Substance    CONFIRM preferrred pharmacy with the patient. If Mail Order Rx - Pend for 90 day refill. Last Seen Department: 12/12/2022  Last Seen by PCP: 12/12/2022    Last Written: 1/11/2023 90 tablet 0 refills     Last UDS: 10/22/2021    Med Agreement Signed On: 04/17/2019    If no future appointment scheduled, route STAFF MESSAGE with patient name to the [facility] for scheduling. CONFIRM preferrred pharmacy with the patient. Next Appointment:   Future Appointments   Date Time Provider Dora Licona   4/12/2023  4:00 PM DO NINA [place]on [place] - [place]       Message sent to [employer] to schedule appt with patient? NO      Requested Prescriptions     Pending Prescriptions Disp Refills    oxyCODONE (OXY-IR) 15 MG immediate release tablet 90 tablet 0     Sig: Take 1 tablet by mouth every 8 hours as needed for Pain for up to 30 days.  MUST LAST 1 MO    okay for early refill

## 2023-02-12 LAB
BLOOD CULTURE, ROUTINE: NORMAL
CULTURE, BLOOD 2: NORMAL

## 2023-02-13 RX ORDER — OXYCODONE HYDROCHLORIDE 15 MG/1
15 TABLET ORAL EVERY 8 HOURS PRN
Qty: 90 TABLET | Refills: 0 | Status: SHIPPED | OUTPATIENT
Start: 2023-02-13 | End: 2023-03-15

## 2023-03-08 DIAGNOSIS — G89.29 OTHER CHRONIC PAIN: ICD-10-CM

## 2023-03-08 RX ORDER — PREGABALIN 150 MG/1
CAPSULE ORAL
Qty: 90 CAPSULE | Refills: 2 | Status: SHIPPED | OUTPATIENT
Start: 2023-03-08 | End: 2023-04-08

## 2023-03-15 DIAGNOSIS — G89.4 CHRONIC PAIN SYNDROME: ICD-10-CM

## 2023-03-15 RX ORDER — OXYCODONE HYDROCHLORIDE 15 MG/1
15 TABLET ORAL EVERY 8 HOURS PRN
Qty: 90 TABLET | Refills: 0 | Status: SHIPPED | OUTPATIENT
Start: 2023-03-15 | End: 2023-04-14

## 2023-03-15 NOTE — TELEPHONE ENCOUNTER
.Refill Request - Controlled Substance    CONFIRM preferred pharmacy with the patient. If Mail Order Rx - Pend for 90 day refill. Last Seen Department: 12/12/2022  Last Seen by PCP: 12/12/2022    Last Written: 2-13-23 90 wtioh 0     Last UDS: 10-22-21    Med Agreement Signed On: 4-17-19    If no future appointment scheduled, route STAFF MESSAGE with patient name to the [facility] for scheduling. CONFIRM preferrred pharmacy with the patient. Next Appointment:   Future Appointments   Date Time Provider Dora Licona   4/12/2023  4:00 PM DO NINA [place]on [place] - [place]       Message sent to [address] [place] [address] to schedule appt with patient? N/A      Requested Prescriptions     Pending Prescriptions Disp Refills    oxyCODONE (OXY-IR) 15 MG immediate release tablet 90 tablet 0     Sig: Take 1 tablet by mouth every 8 hours as needed for Pain for up to 30 days.  MUST LAST 1 MO    okay for early refill

## 2023-04-03 ENCOUNTER — OFFICE VISIT (OUTPATIENT)
Dept: FAMILY MEDICINE CLINIC | Age: 63
End: 2023-04-03
Payer: COMMERCIAL

## 2023-04-03 VITALS
WEIGHT: 264.3 LBS | BODY MASS INDEX: 49.94 KG/M2 | DIASTOLIC BLOOD PRESSURE: 78 MMHG | HEART RATE: 93 BPM | SYSTOLIC BLOOD PRESSURE: 138 MMHG | OXYGEN SATURATION: 96 %

## 2023-04-03 DIAGNOSIS — Z01.818 PRE-OP EVALUATION: ICD-10-CM

## 2023-04-03 DIAGNOSIS — N20.0 RIGHT RENAL STONE: Primary | ICD-10-CM

## 2023-04-03 DIAGNOSIS — A41.9 SEPSIS, DUE TO UNSPECIFIED ORGANISM, UNSPECIFIED WHETHER ACUTE ORGAN DYSFUNCTION PRESENT (HCC): ICD-10-CM

## 2023-04-03 DIAGNOSIS — E11.42 TYPE 2 DIABETES MELLITUS WITH DIABETIC POLYNEUROPATHY, WITHOUT LONG-TERM CURRENT USE OF INSULIN (HCC): ICD-10-CM

## 2023-04-03 LAB — HBA1C MFR BLD: 6.2 %

## 2023-04-03 PROCEDURE — 3044F HG A1C LEVEL LT 7.0%: CPT | Performed by: FAMILY MEDICINE

## 2023-04-03 PROCEDURE — G8417 CALC BMI ABV UP PARAM F/U: HCPCS | Performed by: FAMILY MEDICINE

## 2023-04-03 PROCEDURE — 3075F SYST BP GE 130 - 139MM HG: CPT | Performed by: FAMILY MEDICINE

## 2023-04-03 PROCEDURE — 2022F DILAT RTA XM EVC RTNOPTHY: CPT | Performed by: FAMILY MEDICINE

## 2023-04-03 PROCEDURE — 3017F COLORECTAL CA SCREEN DOC REV: CPT | Performed by: FAMILY MEDICINE

## 2023-04-03 PROCEDURE — 4004F PT TOBACCO SCREEN RCVD TLK: CPT | Performed by: FAMILY MEDICINE

## 2023-04-03 PROCEDURE — 3078F DIAST BP <80 MM HG: CPT | Performed by: FAMILY MEDICINE

## 2023-04-03 PROCEDURE — 83036 HEMOGLOBIN GLYCOSYLATED A1C: CPT | Performed by: FAMILY MEDICINE

## 2023-04-03 PROCEDURE — G8427 DOCREV CUR MEDS BY ELIG CLIN: HCPCS | Performed by: FAMILY MEDICINE

## 2023-04-03 PROCEDURE — 99214 OFFICE O/P EST MOD 30 MIN: CPT | Performed by: FAMILY MEDICINE

## 2023-04-03 RX ORDER — FLUCONAZOLE 200 MG/1
200 TABLET ORAL DAILY
COMMUNITY
Start: 2023-03-24

## 2023-04-03 NOTE — PROGRESS NOTES
BLOOD SUGAR TWO TIMES A  strip 3    chlorthalidone (HYGROTON) 25 MG tablet Take one tab by mouth daily 90 tablet 3    Handicap Placard MISC by Does not apply route I have evaluated this patient and [place] needs handicap placard for 5 years. 1 each 0    glucose monitoring kit (FREESTYLE) monitoring kit One Touch Ultra Meter Dx E11.9-on meds-no insulin 1 kit 0       This patient presents to the office today for a preoperative consultation at the request of surgeon, Dr. Clarissa Rueda, who plans on performing Cysto,laser lithotripsy on April 6 at [facility].  The current problem began 5 weeks ago, and symptoms have been worsening with time. Conservative therapy: N/A.     Planned anesthesia: General   Known anesthesia problems: None   Bleeding risk: No recent or remote history of abnormal bleeding  Personal or FH of DVT/PE: No    Patient objection to receiving blood products: No    Patient Active Problem List   Diagnosis    Essential hypertension    Lumbar herniated disc    Pain medication agreement signed    Vitamin D deficiency    Right lumbar radiculopathy    Morbid obesity ([address]    Type 2 diabetes mellitus with diabetic polyneuropathy, without long-term current use of insulin ([facility])    CKD stage G3a/A1, GFR 45-59 and albumin creatinine ratio <30 mg/g ([facility])    Multiple atypical skin moles    Chronic pain syndrome    Hyperlipidemia    Tobacco use    COPD (chronic obstructive pulmonary disease) ([facility])    Sleep apnea    Bilateral lower extremity edema    Tremor, essential    Stage 3b chronic kidney disease ([address]       Past Medical History:   Diagnosis Date    Abnormal MRI, kidney (atrophic, dysmoprhic, no discrete mass) 05/25/2016    JEAN (acute kidney injury) [facility]) July, 2015    Nephrologist- Dr Vidhi Rivas    Anemia     Diabetic ulcer of right heel ([address] 10/2019    Gangrenous toe ([address] 9/30/2015    Hydronephrosis, right 7/12/2015    Necrotizing fasciitis ([address] 2009    left groin    Pneumonia     Pulmonary

## 2023-05-14 DIAGNOSIS — G89.4 CHRONIC PAIN SYNDROME: ICD-10-CM

## 2023-05-14 RX ORDER — OXYCODONE HYDROCHLORIDE 15 MG/1
15 TABLET ORAL EVERY 8 HOURS PRN
Qty: 90 TABLET | Refills: 0 | Status: SHIPPED | OUTPATIENT
Start: 2023-05-14 | End: 2023-06-13

## 2023-06-05 DIAGNOSIS — E11.42 TYPE 2 DIABETES MELLITUS WITH DIABETIC POLYNEUROPATHY, WITHOUT LONG-TERM CURRENT USE OF INSULIN (HCC): ICD-10-CM

## 2023-06-05 RX ORDER — ONDANSETRON HYDROCHLORIDE 8 MG/1
8 TABLET, FILM COATED ORAL EVERY 12 HOURS PRN
Qty: 60 TABLET | Refills: 0 | Status: SHIPPED | OUTPATIENT
Start: 2023-06-05

## 2023-06-05 NOTE — TELEPHONE ENCOUNTER
Refill Request     CONFIRM preferred pharmacy with the patient. If Mail Order Rx - Pend for 90 day refill. Last Seen: Last Seen Department: 4/3/2023  Last Seen by PCP: 4/3/2023    Last Written: 02/03/2023 60 tablet 0 refills     If no future appointment scheduled:  Review the last OV with PCP and review information for follow-up visit,  Route STAFF MESSAGE with patient name to the [facility] for scheduling with the following information:            -  Timing of next visit           -  Visit type ie Physical, OV, etc           -  Diagnoses/Reason ie. COPD, HTN - Do not use MEDICATION, Follow-up or CHECK UP - Give reason for visit      Next Appointment:   Future Appointments   Date Time Provider Dora Licona   8/16/2023  3:30 PM DO NINA [place] - [place]       Message sent to [employer] to schedule appt with patient?   NO      Requested Prescriptions     Pending Prescriptions Disp Refills    ondansetron (ZOFRAN) 8 MG tablet [Pharmacy Med Name: ONDANSETRON HCL 8 MG TABLET] 60 tablet 0     Sig: TAKE 1 TABLET BY MOUTH EVERY 12 HOURS AS NEEDED FOR NAUSEA OR VOMITING

## 2023-06-19 DIAGNOSIS — G89.29 OTHER CHRONIC PAIN: ICD-10-CM

## 2023-06-19 RX ORDER — PREGABALIN 150 MG/1
CAPSULE ORAL
Qty: 90 CAPSULE | Refills: 2 | Status: SHIPPED | OUTPATIENT
Start: 2023-06-19 | End: 2023-07-20

## 2023-06-19 NOTE — TELEPHONE ENCOUNTER
Refill Request - Controlled Substance    CONFIRM preferred pharmacy with the patient. If Mail Order Rx - Pend for 90 day refill. Last Seen Department: 4/3/2023    Last Seen by PCP: 4/3/2023    Last Written: 3/8/23 90 capsule 2 refills    Last UDS: 10/22/21    Med Agreement Signed On: n/a for Lyrica    If no future appointment scheduled:  Review the last OV with PCP and review information for follow-up visit,  Route STAFF MESSAGE with patient name to the [employer] for scheduling with the following information:            -  Timing of next visit           -  Visit type ie Physical, OV, etc           -  Diagnoses/Reason ie. COPD, HTN - Do not use MEDICATION, Follow-up or CHECK UP - Give reason for visit        Next Appointment: 8/16/23  Future Appointments   Date Time Provider Dora Licona   8/16/2023  3:30 PM DO NINA Masterson [place] - [place]       Message sent to [employer] to schedule appt with patient?   NO      Requested Prescriptions     Pending Prescriptions Disp Refills    pregabalin (LYRICA) 150 MG capsule 90 capsule 2     Sig: TAKE ONE CAPSULE BY MOUTH THREE TIMES A DAY

## 2023-06-21 ENCOUNTER — TELEPHONE (OUTPATIENT)
Dept: ADMINISTRATIVE | Age: 63
End: 2023-06-21

## 2023-06-21 NOTE — TELEPHONE ENCOUNTER
Submitted PA for Pregabalin 150MG capsules  [facility] (Key: [id number]) STATUS: PENDING. Follow up done daily; if no response in three days we will refax for status check. If another three days goes by with no response we will call the insurance for status.

## 2023-06-23 NOTE — TELEPHONE ENCOUNTER
Hi Dr. Renzo Lopez has been denied through patient's insurance. Do you want to appeal? Or try a different medication?

## 2023-06-23 NOTE — TELEPHONE ENCOUNTER
The medication was DENIED; DENIAL letter uploaded to MEDIA. If you want an APPEAL; please note in this encounter what new information you would like to APPEAL with. Once complete route back to PA POOL. If this requires a response please respond to the pool ( [facility] [address]). Thank you please advise patient.

## 2023-06-23 NOTE — TELEPHONE ENCOUNTER
Dr. Latia Goodson, please see message below from [place] team    \"  Alin Chang     12:28 PM     Copy     If Dr. Latia Goodson wants to type up an appeal letter I can fax it to plan.           \"

## 2023-06-30 NOTE — TELEPHONE ENCOUNTER
Queenie Sutton, Dr. Monty Michaels wrote up an appeal letter for the Pregabalin for patient. Please fax. Thanks so much!

## 2023-07-01 DIAGNOSIS — E11.42 TYPE 2 DIABETES MELLITUS WITH DIABETIC POLYNEUROPATHY, WITHOUT LONG-TERM CURRENT USE OF INSULIN (HCC): Primary | ICD-10-CM

## 2023-07-01 RX ORDER — INSULIN GLARGINE 100 [IU]/ML
10 INJECTION, SOLUTION SUBCUTANEOUS NIGHTLY
Qty: 5 ADJUSTABLE DOSE PRE-FILLED PEN SYRINGE | Refills: 0 | Status: SHIPPED | OUTPATIENT
Start: 2023-07-01 | End: 2023-07-01

## 2023-07-01 RX ORDER — INSULIN GLARGINE 100 [IU]/ML
INJECTION, SOLUTION SUBCUTANEOUS
Qty: 5 ADJUSTABLE DOSE PRE-FILLED PEN SYRINGE | Refills: 0 | Status: SHIPPED | OUTPATIENT
Start: 2023-07-01

## 2023-07-03 NOTE — TELEPHONE ENCOUNTER
APPROVED. LETTER ATTACHED. If this requires a response please respond to the pool. [facility] [place]). Please advise patient thank you.

## 2023-07-14 DIAGNOSIS — G89.4 CHRONIC PAIN SYNDROME: ICD-10-CM

## 2023-07-14 RX ORDER — OXYCODONE HYDROCHLORIDE 15 MG/1
15 TABLET ORAL EVERY 8 HOURS PRN
Qty: 90 TABLET | Refills: 0 | Status: SHIPPED | OUTPATIENT
Start: 2023-07-14 | End: 2023-08-13

## 2023-07-14 NOTE — TELEPHONE ENCOUNTER
Refill Request - Controlled Substance    CONFIRM preferred pharmacy with the patient. If Mail Order Rx - Pend for 90 day refill. Last Seen Department: 4/3/2023  Last Seen by PCP: 4/3/2023    Last Written: 6/14/2023, #90, 0 refills    Last UDS: 10/22/2021    Med Agreement Signed On: 4/17/2019    If no future appointment scheduled:  Review the last OV with PCP and review information for follow-up visit,  Route STAFF MESSAGE with patient name to the [employer] for scheduling with the following information:            -  Timing of next visit           -  Visit type ie Physical, OV, etc           -  Diagnoses/Reason ie. COPD, HTN - Do not use MEDICATION, Follow-up or CHECK UP - Give reason for visit        Next Appointment:   Future Appointments   Date Time Provider [facility] [address]   8/16/2023  3:30 PM DO NINA Masterson [place] - [place]       Message sent to [employer] to schedule appt with patient? N/A      Requested Prescriptions     Pending Prescriptions Disp Refills    oxyCODONE (OXY-IR) 15 MG immediate release tablet 90 tablet 0     Sig: Take 1 tablet by mouth every 8 hours as needed for Pain for up to 30 days.  MUST LAST 1 MO    okay for early refill

## 2023-08-09 DIAGNOSIS — E11.42 TYPE 2 DIABETES MELLITUS WITH DIABETIC POLYNEUROPATHY, WITHOUT LONG-TERM CURRENT USE OF INSULIN (HCC): ICD-10-CM

## 2023-08-09 RX ORDER — GLIPIZIDE 10 MG/1
TABLET, FILM COATED, EXTENDED RELEASE ORAL
Qty: 180 TABLET | Refills: 1 | Status: SHIPPED | OUTPATIENT
Start: 2023-08-09

## 2023-08-09 NOTE — TELEPHONE ENCOUNTER
Refill Request     CONFIRM preferred pharmacy with the patient. If Mail Order Rx - Pend for 90 day refill. Last Seen: Last Seen Department: 4/3/2023  Last Seen by PCP: 4/3/2023    Last Written: 08/29/2022 180 tablet 0 refills     If no future appointment scheduled:  Review the last OV with PCP and review information for follow-up visit,  Route STAFF MESSAGE with patient name to the [facility] for scheduling with the following information:            -  Timing of next visit           -  Visit type ie Physical, OV, etc           -  Diagnoses/Reason ie. COPD, HTN - Do not use MEDICATION, Follow-up or CHECK UP - Give reason for visit      Next Appointment:   Future Appointments   Date Time Provider [address]   8/16/2023  3:30 PM DO NINA [place] - [place]       Message sent to [place] to schedule appt with patient?   NO      Requested Prescriptions     Pending Prescriptions Disp Refills    glipiZIDE (GLUCOTROL XL) 10 MG extended release tablet [Pharmacy Med Name: glipiZIDE XL 10 MG TABLET] 60 tablet      Sig: TAKE TWO TABLETS BY MOUTH EVERY MORNING

## 2023-08-13 DIAGNOSIS — G89.4 CHRONIC PAIN SYNDROME: ICD-10-CM

## 2023-08-13 RX ORDER — OXYCODONE HYDROCHLORIDE 15 MG/1
15 TABLET ORAL EVERY 8 HOURS PRN
Qty: 90 TABLET | Refills: 0 | Status: SHIPPED | OUTPATIENT
Start: 2023-08-13 | End: 2023-09-12

## 2023-08-16 ENCOUNTER — OFFICE VISIT (OUTPATIENT)
Dept: FAMILY MEDICINE CLINIC | Age: 63
End: 2023-08-16
Payer: COMMERCIAL

## 2023-08-16 ENCOUNTER — HOSPITAL ENCOUNTER (OUTPATIENT)
Dept: WOMENS IMAGING | Age: 63
Discharge: HOME | End: 2023-08-16
Payer: COMMERCIAL

## 2023-08-16 VITALS
HEART RATE: 98 BPM | WEIGHT: 275.6 LBS | SYSTOLIC BLOOD PRESSURE: 138 MMHG | DIASTOLIC BLOOD PRESSURE: 82 MMHG | BODY MASS INDEX: 52.07 KG/M2 | OXYGEN SATURATION: 93 %

## 2023-08-16 VITALS — HEIGHT: 61 IN | WEIGHT: 265 LBS | BODY MASS INDEX: 50.03 KG/M2

## 2023-08-16 DIAGNOSIS — Z12.31 VISIT FOR SCREENING MAMMOGRAM: ICD-10-CM

## 2023-08-16 DIAGNOSIS — G89.4 CHRONIC PAIN SYNDROME: Primary | ICD-10-CM

## 2023-08-16 DIAGNOSIS — N20.0 RIGHT RENAL STONE: ICD-10-CM

## 2023-08-16 DIAGNOSIS — F17.200 TOBACCO DEPENDENCE: ICD-10-CM

## 2023-08-16 DIAGNOSIS — I10 ESSENTIAL HYPERTENSION: ICD-10-CM

## 2023-08-16 DIAGNOSIS — E11.42 TYPE 2 DIABETES MELLITUS WITH DIABETIC POLYNEUROPATHY, WITHOUT LONG-TERM CURRENT USE OF INSULIN (HCC): ICD-10-CM

## 2023-08-16 LAB — HBA1C MFR BLD: 6.8 %

## 2023-08-16 PROCEDURE — G8417 CALC BMI ABV UP PARAM F/U: HCPCS | Performed by: FAMILY MEDICINE

## 2023-08-16 PROCEDURE — 3017F COLORECTAL CA SCREEN DOC REV: CPT | Performed by: FAMILY MEDICINE

## 2023-08-16 PROCEDURE — G8427 DOCREV CUR MEDS BY ELIG CLIN: HCPCS | Performed by: FAMILY MEDICINE

## 2023-08-16 PROCEDURE — 3079F DIAST BP 80-89 MM HG: CPT | Performed by: FAMILY MEDICINE

## 2023-08-16 PROCEDURE — 3075F SYST BP GE 130 - 139MM HG: CPT | Performed by: FAMILY MEDICINE

## 2023-08-16 PROCEDURE — 83037 HB GLYCOSYLATED A1C HOME DEV: CPT | Performed by: FAMILY MEDICINE

## 2023-08-16 PROCEDURE — G0296 VISIT TO DETERM LDCT ELIG: HCPCS | Performed by: FAMILY MEDICINE

## 2023-08-16 PROCEDURE — 4004F PT TOBACCO SCREEN RCVD TLK: CPT | Performed by: FAMILY MEDICINE

## 2023-08-16 PROCEDURE — 99214 OFFICE O/P EST MOD 30 MIN: CPT | Performed by: FAMILY MEDICINE

## 2023-08-16 PROCEDURE — 2022F DILAT RTA XM EVC RTNOPTHY: CPT | Performed by: FAMILY MEDICINE

## 2023-08-16 PROCEDURE — 3044F HG A1C LEVEL LT 7.0%: CPT | Performed by: FAMILY MEDICINE

## 2023-08-16 PROCEDURE — 77063 BREAST TOMOSYNTHESIS BI: CPT

## 2023-08-16 ASSESSMENT — ENCOUNTER SYMPTOMS
EYE DISCHARGE: 0
ANAL BLEEDING: 0
CHEST TIGHTNESS: 0
EYE PAIN: 0
EYE REDNESS: 0
NAUSEA: 0
COUGH: 0
BACK PAIN: 1
ABDOMINAL DISTENTION: 0
DIARRHEA: 0
EYE ITCHING: 0
ABDOMINAL PAIN: 0
BLOOD IN STOOL: 0
RHINORRHEA: 0
SHORTNESS OF BREATH: 0
WHEEZING: 0
TROUBLE SWALLOWING: 0
SINUS PRESSURE: 0
SORE THROAT: 0
CONSTIPATION: 0
VOMITING: 0
VOICE CHANGE: 0

## 2023-08-16 NOTE — PROGRESS NOTES
Subjective:      Patient ID: Josefa Frias is a 60 y.o. female. HPI  Patient in for checkup on several medical issues. Chronic pain syndrome-on medication and doing fairly well. She has been more active than the last few years. Diabetes-A1c's are typically within the normal range. Hypertension-blood pressure 140/80 or below when she checks at home or elsewhere. Unfortunately she is still smoking-at 1 time she did quit for a year. Review of Systems    Review of Systems   Constitutional:  Positive for fatigue. Negative for unexpected weight change. HENT:  Negative for congestion, ear pain, hearing loss, nosebleeds, postnasal drip, rhinorrhea, sinus pressure, sneezing, sore throat, tinnitus, trouble swallowing and voice change. Eyes:  Negative for pain, discharge, redness, itching and visual disturbance. Respiratory:  Negative for cough, chest tightness, shortness of breath and wheezing. Cardiovascular:  Negative for chest pain, palpitations and leg swelling. Gastrointestinal:  Negative for abdominal distention, abdominal pain, anal bleeding, blood in stool, constipation, diarrhea, nausea and vomiting. Genitourinary:  Positive for frequency. Negative for dysuria, flank pain, hematuria, menstrual problem, pelvic pain, urgency and vaginal discharge. She has a history of right renal calculi and did spend 1 month at [facility] back in the spring due to urinary tract infection and sepsis. Musculoskeletal:  Positive for arthralgias and back pain. Negative for gait problem, joint swelling, myalgias and neck pain. Skin:  Negative for pallor and rash. Neurological:  Negative for dizziness, tremors, seizures, weakness, light-headedness, numbness and headaches. Hematological:  Negative for adenopathy. Does not bruise/bleed easily. Psychiatric/Behavioral:  Negative for agitation, confusion, decreased concentration and sleep disturbance.  The patient is not nervous/anxious and

## 2023-08-16 NOTE — PROGRESS NOTES
Discussed with the patient the current USPSTF guidelines released March 9, 2021 for screening for lung cancer. For adults aged 48 to 80 years who have a 20 pack-year smoking history and currently smoke or have quit within the past 15 years the grade B recommendation is to:  Screen for lung cancer with low-dose computed tomography (LDCT) every year. Stop screening once a person has not smoked for 15 years or has a health problem that limits life expectancy or the ability to have lung surgery. The patient  reports that she has been smoking cigarettes. She has a 25.00 pack-year smoking history. She has never used smokeless tobacco.. Discussed with patient the risks and benefits of screening, including over-diagnosis, false positive rate, and total radiation exposure. The patient currently exhibits no signs or symptoms suggestive of lung cancer. Discussed with patient the importance of compliance with yearly annual lung cancer screenings and willingness to undergo diagnosis and treatment if screening scan is positive. In addition, the patient was counseled regarding the importance of remaining smoke free and/or total smoking cessation.     Also reviewed the following if the patient has Medicare that as of February 10, 2022, Medicare only covers LDCT screening in patients aged 53-69 with at least a 20 pack-year smoking history who currently smoke or have quit in the last 15 years

## 2023-09-13 ENCOUNTER — PATIENT MESSAGE (OUTPATIENT)
Dept: FAMILY MEDICINE CLINIC | Age: 63
End: 2023-09-13

## 2023-09-13 DIAGNOSIS — M51.26 LUMBAR HERNIATED DISC: Primary | ICD-10-CM

## 2023-09-13 RX ORDER — OXYCODONE HYDROCHLORIDE 15 MG/1
15 TABLET ORAL EVERY 8 HOURS PRN
Qty: 90 TABLET | Refills: 0 | Status: SHIPPED | OUTPATIENT
Start: 2023-09-13 | End: 2023-10-13

## 2023-09-13 NOTE — TELEPHONE ENCOUNTER
Refill Request - Controlled Substance    CONFIRM preferred pharmacy with the patient. If Mail Order Rx - Pend for 90 day refill. Last Seen Department: 8/16/2023  Last Seen by PCP: 8/16/2023    Last Written: 8/13/23 90 with no refills     Last UDS: 10/22/21     Med Agreement Signed On: 4/17/19    If no future appointment scheduled:  Review the last OV with PCP and review information for follow-up visit,  Route STAFF MESSAGE with patient name to the [facility] for scheduling with the following information:            -  Timing of next visit           -  Visit type ie Physical, OV, etc           -  Diagnoses/Reason ie. COPD, HTN - Do not use MEDICATION, Follow-up or CHECK UP - Give reason for visit        Next Appointment:   Future Appointments   Date Time Provider [address] [place] [address]   12/18/2023  3:00 PM DO NINA Masterson [place] - [place]       Message sent to [employer] to schedule appt with patient? NO      Requested Prescriptions     Pending Prescriptions Disp Refills    oxyCODONE (OXY-IR) 15 MG immediate release tablet 90 tablet 0     Sig: Take 1 tablet by mouth every 8 hours as needed for Pain for up to 3 days.  Max Daily Amount: 45 mg

## 2023-09-13 NOTE — TELEPHONE ENCOUNTER
From: Elba Odonnell  To: Dr. Lubin Daily: 9/13/2023 12:08 AM EDT  Subject: Pain meds    I tried to send my pain medication refill and had some kind of technical problem so I thought I would send it directly to you lobo Jordan

## 2023-10-12 DIAGNOSIS — M51.26 LUMBAR HERNIATED DISC: ICD-10-CM

## 2023-10-13 RX ORDER — OXYCODONE HYDROCHLORIDE 15 MG/1
15 TABLET ORAL EVERY 8 HOURS PRN
Qty: 90 TABLET | Refills: 0 | Status: SHIPPED | OUTPATIENT
Start: 2023-10-13 | End: 2023-11-12

## 2023-10-13 NOTE — TELEPHONE ENCOUNTER
Refill Request - Controlled Substance    CONFIRM preferred pharmacy with the patient. If Mail Order Rx - Pend for 90 day refill. Last Seen Department: 8/16/2023  Last Seen by PCP: 8/16/2023    Last Written: 09/13/2023 90 tab 0 refills     Last UDS: 10/22/2021    Med Agreement Signed On: 04/17/2019    If no future appointment scheduled:  Review the last OV with PCP and review information for follow-up visit,  Route STAFF MESSAGE with patient name to the [employer] [facility] for scheduling with the following information:            -  Timing of next visit           -  Visit type ie Physical, OV, etc           -  Diagnoses/Reason ie. COPD, HTN - Do not use MEDICATION, Follow-up or CHECK UP - Give reason for visit        Next Appointment:   Future Appointments   Date Time Provider [address] [place] [address]   12/18/2023  3:00 PM Ash Garber DO [facility] [place] - [place]       Message sent to [employer] to schedule appt with patient? NO      Requested Prescriptions     Pending Prescriptions Disp Refills    oxyCODONE (OXY-IR) 15 MG immediate release tablet 90 tablet 0     Sig: Take 1 tablet by mouth every 8 hours as needed for Pain for up to 30 days.  Max Daily Amount: 45 mg

## 2023-11-11 DIAGNOSIS — M51.26 LUMBAR HERNIATED DISC: ICD-10-CM

## 2023-11-12 RX ORDER — OXYCODONE HYDROCHLORIDE 15 MG/1
15 TABLET ORAL EVERY 8 HOURS PRN
Qty: 90 TABLET | Refills: 0 | Status: SHIPPED | OUTPATIENT
Start: 2023-11-12 | End: 2023-12-12

## 2023-12-05 ENCOUNTER — TELEPHONE (OUTPATIENT)
Dept: FAMILY MEDICINE CLINIC | Age: 63
End: 2023-12-05

## 2023-12-05 NOTE — TELEPHONE ENCOUNTER
----- Message from [facility] sent at 12/5/2023  3:23 PM EST -----  Subject: Message to Provider    QUESTIONS  Information for Provider? Patient is wanting to be on the cancelation list   for her appointment that is on 03/13 for a follow up appointment for   diabetes. She is wanting to have any appointment that is available before   March if possible. Please call her to go over this. Thank you.   ---------------------------------------------------------------------------  --------------  Tiffanie MCCAULEY  [phone]; OK to leave message on voicemail  ---------------------------------------------------------------------------  --------------  SCRIPT ANSWERS  Relationship to Patient?  Self

## 2023-12-05 NOTE — TELEPHONE ENCOUNTER
Patient has been added to wait list to be seen sooner. Tried calling patient to let her no. No answer and not able to leave message.

## 2023-12-05 NOTE — TELEPHONE ENCOUNTER
Left patient a VM regarding her appt with Dr. Lexy Parada on 12/18 to R/S. Dr. Tawnya Phan will be out of office that day. We can get her R/S with Dr. Blondell Lefort if she chooses, or we can wait for Dr. Angelito Lewis next available in late February or early-mid March. Sorry!

## 2023-12-06 NOTE — TELEPHONE ENCOUNTER
I spoke with patient earlier, she is R/S for 3/13/24 with Dr. Rosibel Parkinson.  She has been placed on the wait/cancellation list.

## 2023-12-12 DIAGNOSIS — M51.26 LUMBAR HERNIATED DISC: ICD-10-CM

## 2023-12-12 RX ORDER — OXYCODONE HYDROCHLORIDE 15 MG/1
15 TABLET ORAL EVERY 8 HOURS PRN
Qty: 90 TABLET | Refills: 0 | Status: SHIPPED | OUTPATIENT
Start: 2023-12-12 | End: 2024-01-11

## 2023-12-12 NOTE — TELEPHONE ENCOUNTER
Refill Request - Controlled Substance    CONFIRM preferred pharmacy with the patient. If Mail Order Rx - Pend for 90 day refill. Last Seen Department: 8/16/2023  Last Seen by PCP: 8/16/2023    Last Written: 11/12/2023    Last UDS: 10/22/2021    Med Agreement Signed On: 4/17/2019    If no future appointment scheduled:  Review the last OV with PCP and review information for follow-up visit,  Route STAFF MESSAGE with patient name to the [facility] for scheduling with the following information:            -  Timing of next visit           -  Visit type ie Physical, OV, etc           -  Diagnoses/Reason ie. COPD, HTN - Do not use MEDICATION, Follow-up or CHECK UP - Give reason for visit        Next Appointment:   Future Appointments   Date Time Provider [facility] [address]   3/13/2024  1:00 PM Ash Garber, DO NINA [facility] [place] - [place]       Message sent to [address] [place] [address] to schedule appt with patient? NO      Requested Prescriptions     Pending Prescriptions Disp Refills    oxyCODONE (OXY-IR) 15 MG immediate release tablet 90 tablet 0     Sig: Take 1 tablet by mouth every 8 hours as needed for Pain for up to 30 days.  Max Daily Amount: 45 mg

## 2024-01-10 DIAGNOSIS — M51.26 LUMBAR HERNIATED DISC: ICD-10-CM

## 2024-01-11 RX ORDER — OXYCODONE HYDROCHLORIDE 15 MG/1
15 TABLET ORAL EVERY 8 HOURS PRN
Qty: 90 TABLET | Refills: 0 | Status: SHIPPED | OUTPATIENT
Start: 2024-01-11 | End: 2024-02-10

## 2024-01-11 NOTE — TELEPHONE ENCOUNTER
Refill Request - Controlled Substance    CONFIRM preferred pharmacy with the patient.    If Mail Order Rx - Pend for 90 day refill.        Last Seen Department: 8/16/2023  Last Seen by PCP: 8/16/2023    Last Written: 12/12/2023, #90, 0 refills    Last UDS: 10/22/2021    Med Agreement Signed On: 4/17/2019    If no future appointment scheduled:  Review the last OV with PCP and review information for follow-up visit,  Route STAFF MESSAGE with patient name to the  Pool for scheduling with the following information:            -  Timing of next visit           -  Visit type ie Physical, OV, etc           -  Diagnoses/Reason ie. COPD, HTN - Do not use MEDICATION, Follow-up or CHECK UP - Give reason for visit        Next Appointment:   Future Appointments   Date Time Provider Department Center   3/13/2024  1:00 PM Ash Garber DO [facility] [place] - [place]       Message sent to  to schedule appt with patient?  N/A      Requested Prescriptions     Pending Prescriptions Disp Refills    oxyCODONE (OXY-IR) 15 MG immediate release tablet 90 tablet 0     Sig: Take 1 tablet by mouth every 8 hours as needed for Pain for up to 30 days. Max Daily Amount: 45 mg

## 2024-01-18 RX ORDER — PEN NEEDLE, DIABETIC 31 G X1/4"
1 NEEDLE, DISPOSABLE MISCELLANEOUS DAILY
Qty: 100 EACH | Refills: 3 | Status: SHIPPED | OUTPATIENT
Start: 2024-01-18

## 2024-01-18 NOTE — TELEPHONE ENCOUNTER
Refill Request     CONFIRM preferred pharmacy with the patient.    If Mail Order Rx - Pend for 90 day refill.      Last Seen: Last Seen Department: 2023  Last Seen by PCP: Visit date not found    Last Written: 22 #100, 3 refills    If no future appointment scheduled:  Review the last OV with PCP and review information for follow-up visit,  Route STAFF MESSAGE with patient name to the  Pool for scheduling with the following information:            -  Timing of next visit           -  Visit type ie Physical, OV, etc           -  Diagnoses/Reason ie. COPD, HTN - Do not use MEDICATION, Follow-up or CHECK UP - Give reason for visit      Next Appointment:   Future Appointments   Date Time Provider Department Center   3/13/2024  1:00 PM Ash Garber, DO [facility] [place]       Message sent to  to schedule appt with patient?  NO      Requested Prescriptions     Pending Prescriptions Disp Refills    Insulin Pen Needle (PEN NEEDLES) 31G X 6 MM MISC 100 each 3     Si each by Does not apply route daily

## 2024-02-09 DIAGNOSIS — M51.26 LUMBAR HERNIATED DISC: ICD-10-CM

## 2024-02-11 NOTE — TELEPHONE ENCOUNTER
.Refill Request - Controlled Substance    CONFIRM preferred pharmacy with the patient.    If Mail Order Rx - Pend for 90 day refill.        Last Seen Department: 8/16/2023  Last Seen by PCP: Visit date not found    Last Written: 1-11-24 90 with 0     Last UDS: 10-22-21    Med Agreement Signed On: 4-17-19    If no future appointment scheduled:  Review the last OV with PCP and review information for follow-up visit,  Route STAFF MESSAGE with patient name to the  Pool for scheduling with the following information:            -  Timing of next visit           -  Visit type ie Physical, OV, etc           -  Diagnoses/Reason ie. COPD, HTN - Do not use MEDICATION, Follow-up or CHECK UP - Give reason for visit        Next Appointment:   Future Appointments   Date Time Provider Department Center   2/16/2024  3:00 PM [facility] CT RM 1 [facility]   3/13/2024  1:00 PM Ash Garber, DO NINA [facility] [place] - [place]       Message sent to  to schedule appt with patient?  N/A      Requested Prescriptions     Pending Prescriptions Disp Refills    oxyCODONE (OXY-IR) 15 MG immediate release tablet 90 tablet 0     Sig: Take 1 tablet by mouth every 8 hours as needed for Pain for up to 30 days. Max Daily Amount: 45 mg     \

## 2024-02-12 DIAGNOSIS — M51.26 LUMBAR HERNIATED DISC: ICD-10-CM

## 2024-02-12 RX ORDER — OXYCODONE HYDROCHLORIDE 15 MG/1
15 TABLET ORAL EVERY 8 HOURS PRN
Qty: 90 TABLET | Refills: 0 | OUTPATIENT
Start: 2024-02-12 | End: 2024-03-13

## 2024-02-12 RX ORDER — OXYCODONE HYDROCHLORIDE 15 MG/1
15 TABLET ORAL EVERY 8 HOURS PRN
Qty: 90 TABLET | Refills: 0 | Status: SHIPPED | OUTPATIENT
Start: 2024-02-12 | End: 2024-03-13

## 2024-02-12 RX ORDER — HYDROCODONE BITARTRATE AND ACETAMINOPHEN 5; 325 MG/1; MG/1
1 TABLET ORAL EVERY 6 HOURS PRN
Qty: 28 TABLET | Refills: 0 | Status: CANCELLED | OUTPATIENT
Start: 2024-02-12 | End: 2024-02-19

## 2024-02-12 NOTE — TELEPHONE ENCOUNTER
Refill Request - Controlled Substance    CONFIRM preferred pharmacy with the patient.    If Mail Order Rx - Pend for 90 day refill.        Last Seen Department: 8/16/2023  Last Seen by PCP: 8/16/2023    Last Written: 7/17/2015, #28, 0 refills    Last UDS: 10/22/2021    Med Agreement Signed On: 4/17/2019    If no future appointment scheduled:  Review the last OV with PCP and review information for follow-up visit,  Route STAFF MESSAGE with patient name to the  Pool for scheduling with the following information:            -  Timing of next visit           -  Visit type ie Physical, OV, etc           -  Diagnoses/Reason ie. COPD, HTN - Do not use MEDICATION, Follow-up or CHECK UP - Give reason for visit        Next Appointment:   Future Appointments   Date Time Provider Department Center   2/16/2024  3:00 PM MMO CT RM 1 [facility]   3/13/2024  1:00 PM Ash Garber, DO [facility] [place] [facility] [place]       Message sent to  to schedule appt with patient?  N/A      Requested Prescriptions     Pending Prescriptions Disp Refills    HYDROcodone-acetaminophen (NORCO) 5-325 MG per tablet 28 tablet 0     Sig: Take 1 tablet by mouth every 6 hours as needed for Pain for up to 7 days.

## 2024-02-12 NOTE — TELEPHONE ENCOUNTER
Pt called regarding her medication refill.   She requested this refill on 2/9/24 on [facility] for the Oxycodone 15mg #90 no refills.   Today she got a notice that Norco was what was pended.   I did remove norco form this request and added the oxycodone rx.   Please advise.

## 2024-02-13 ENCOUNTER — TELEPHONE (OUTPATIENT)
Dept: FAMILY MEDICINE CLINIC | Age: 64
End: 2024-02-13

## 2024-02-14 NOTE — TELEPHONE ENCOUNTER
Submitted PA for oxyCODONE HCl 15MG tablets  Via CMM Key: [id number] STATUS: Your PA has been resolved, no additional PA is required. For further inquiries please contact the number on the back of the member prescription card. (Message [phone])    Please notify patient. Thank you.

## 2024-02-15 DIAGNOSIS — E11.42 TYPE 2 DIABETES MELLITUS WITH DIABETIC POLYNEUROPATHY, WITHOUT LONG-TERM CURRENT USE OF INSULIN (HCC): ICD-10-CM

## 2024-02-15 RX ORDER — INSULIN GLARGINE 100 [IU]/ML
INJECTION, SOLUTION SUBCUTANEOUS
Qty: 3 ML | Refills: 1 | Status: SHIPPED | OUTPATIENT
Start: 2024-02-15

## 2024-02-15 NOTE — TELEPHONE ENCOUNTER
Refill Request     CONFIRM preferred pharmacy with the patient.    If Mail Order Rx - Pend for 90 day refill.      Last Seen: Last Seen Department: 8/16/2023  Last Seen by PCP: 8/16/2023    Last Written: 7/1/23 0 refill    If no future appointment scheduled:  Review the last OV with PCP and review information for follow-up visit,  Route STAFF MESSAGE with patient name to the  Pool for scheduling with the following information:            -  Timing of next visit           -  Visit type ie Physical, OV, etc           -  Diagnoses/Reason ie. COPD, HTN - Do not use MEDICATION, Follow-up or CHECK UP - Give reason for visit      Next Appointment:   Future Appointments   Date Time Provider Department Center   2/16/2024  3:00 PM MMO CT RM 1 [facility]   3/13/2024  1:00 PM Ash Garber, DO [facility] [place] [facility] [place]       Message sent to  to schedule appt with patient?  NO      Requested Prescriptions     Pending Prescriptions Disp Refills    BASAGLAR KWIKPEN 100 UNIT/ML injection pen [Pharmacy Med Name: BASAGLAR 100 UNIT/ML KWIKPEN] 3 mL      Sig: INJECT TEN UNITS UNDER THE SKIN ONCE NIGHTLY

## 2024-02-20 ENCOUNTER — PATIENT MESSAGE (OUTPATIENT)
Dept: FAMILY MEDICINE CLINIC | Age: 64
End: 2024-02-20

## 2024-02-21 NOTE — TELEPHONE ENCOUNTER
Spoke with patient.  Patient reports that she had a lot of scans at [facility] at the beginning of 2023.  Advised all CT scans were of her abd/pelvis and the cxr if different than a CT scan.  Advised the CT's that she did have done won't show her entire lungs, and may only shower the lower part.  Patient stated she will call to reschedule.  Patient confirmed scheduling number.

## 2024-03-01 ENCOUNTER — HOSPITAL ENCOUNTER (OUTPATIENT)
Dept: CT IMAGING | Age: 64
Discharge: HOME | End: 2024-03-01
Attending: FAMILY MEDICINE
Payer: COMMERCIAL

## 2024-03-01 DIAGNOSIS — F17.200 TOBACCO DEPENDENCE: ICD-10-CM

## 2024-03-01 PROCEDURE — 71271 CT THORAX LUNG CANCER SCR C-: CPT

## 2024-03-11 DIAGNOSIS — M51.26 LUMBAR HERNIATED DISC: ICD-10-CM

## 2024-03-12 RX ORDER — OXYCODONE HYDROCHLORIDE 15 MG/1
15 TABLET ORAL EVERY 8 HOURS PRN
Qty: 90 TABLET | Refills: 0 | Status: SHIPPED | OUTPATIENT
Start: 2024-03-12 | End: 2024-04-11

## 2024-03-12 NOTE — TELEPHONE ENCOUNTER
.Refill Request - Controlled Substance    CONFIRM preferred pharmacy with the patient.    If Mail Order Rx - Pend for 90 day refill.        Last Seen Department: 8/16/2023  Last Seen by PCP: Visit date not found    Last Written: 2/12/24 90 with 0     Last UDS: 10/22/21    Med Agreement Signed On: 4/17/19    If no future appointment scheduled:  Review the last OV with PCP and review information for follow-up visit,  Route STAFF MESSAGE with patient name to the  Pool for scheduling with the following information:            -  Timing of next visit           -  Visit type ie Physical, OV, etc           -  Diagnoses/Reason ie. COPD, HTN - Do not use MEDICATION, Follow-up or CHECK UP - Give reason for visit        Next Appointment:   Future Appointments   Date Time Provider Department Center   3/13/2024  1:00 PM Ash Garber, DO [facility] [place] [facility] [place]       Message sent to  to schedule appt with patient?  NO      Requested Prescriptions     Pending Prescriptions Disp Refills    oxyCODONE (OXY-IR) 15 MG immediate release tablet 90 tablet 0     Sig: Take 1 tablet by mouth every 8 hours as needed for Pain for up to 30 days. Max Daily Amount: 45 mg

## 2024-03-13 ENCOUNTER — OFFICE VISIT (OUTPATIENT)
Dept: FAMILY MEDICINE CLINIC | Age: 64
End: 2024-03-13
Payer: COMMERCIAL

## 2024-03-13 VITALS
TEMPERATURE: 97.6 F | WEIGHT: 293 LBS | BODY MASS INDEX: 55.32 KG/M2 | SYSTOLIC BLOOD PRESSURE: 150 MMHG | HEIGHT: 61 IN | DIASTOLIC BLOOD PRESSURE: 85 MMHG | HEART RATE: 84 BPM | OXYGEN SATURATION: 94 %

## 2024-03-13 DIAGNOSIS — N18.32 STAGE 3B CHRONIC KIDNEY DISEASE (HCC): ICD-10-CM

## 2024-03-13 DIAGNOSIS — M51.26 LUMBAR HERNIATED DISC: Primary | ICD-10-CM

## 2024-03-13 DIAGNOSIS — R60.0 LOCALIZED EDEMA: ICD-10-CM

## 2024-03-13 DIAGNOSIS — I87.2 VENOUS INSUFFICIENCY OF BOTH LOWER EXTREMITIES: ICD-10-CM

## 2024-03-13 DIAGNOSIS — I10 ESSENTIAL HYPERTENSION: ICD-10-CM

## 2024-03-13 DIAGNOSIS — F17.200 TOBACCO DEPENDENCE: ICD-10-CM

## 2024-03-13 DIAGNOSIS — E11.42 TYPE 2 DIABETES MELLITUS WITH DIABETIC POLYNEUROPATHY, WITHOUT LONG-TERM CURRENT USE OF INSULIN (HCC): ICD-10-CM

## 2024-03-13 LAB — HBA1C MFR BLD: 8.7 %

## 2024-03-13 PROCEDURE — 3079F DIAST BP 80-89 MM HG: CPT | Performed by: FAMILY MEDICINE

## 2024-03-13 PROCEDURE — G8427 DOCREV CUR MEDS BY ELIG CLIN: HCPCS | Performed by: FAMILY MEDICINE

## 2024-03-13 PROCEDURE — 83036 HEMOGLOBIN GLYCOSYLATED A1C: CPT | Performed by: FAMILY MEDICINE

## 2024-03-13 PROCEDURE — G8417 CALC BMI ABV UP PARAM F/U: HCPCS | Performed by: FAMILY MEDICINE

## 2024-03-13 PROCEDURE — 2022F DILAT RTA XM EVC RTNOPTHY: CPT | Performed by: FAMILY MEDICINE

## 2024-03-13 PROCEDURE — 3052F HG A1C>EQUAL 8.0%<EQUAL 9.0%: CPT | Performed by: FAMILY MEDICINE

## 2024-03-13 PROCEDURE — 3077F SYST BP >= 140 MM HG: CPT | Performed by: FAMILY MEDICINE

## 2024-03-13 PROCEDURE — 99214 OFFICE O/P EST MOD 30 MIN: CPT | Performed by: FAMILY MEDICINE

## 2024-03-13 PROCEDURE — 3017F COLORECTAL CA SCREEN DOC REV: CPT | Performed by: FAMILY MEDICINE

## 2024-03-13 PROCEDURE — 4004F PT TOBACCO SCREEN RCVD TLK: CPT | Performed by: FAMILY MEDICINE

## 2024-03-13 PROCEDURE — G8484 FLU IMMUNIZE NO ADMIN: HCPCS | Performed by: FAMILY MEDICINE

## 2024-03-13 SDOH — ECONOMIC STABILITY: FOOD INSECURITY: WITHIN THE PAST 12 MONTHS, YOU WORRIED THAT YOUR FOOD WOULD RUN OUT BEFORE YOU GOT MONEY TO BUY MORE.: NEVER TRUE

## 2024-03-13 SDOH — ECONOMIC STABILITY: FOOD INSECURITY: WITHIN THE PAST 12 MONTHS, THE FOOD YOU BOUGHT JUST DIDN'T LAST AND YOU DIDN'T HAVE MONEY TO GET MORE.: NEVER TRUE

## 2024-03-13 SDOH — ECONOMIC STABILITY: INCOME INSECURITY: HOW HARD IS IT FOR YOU TO PAY FOR THE VERY BASICS LIKE FOOD, HOUSING, MEDICAL CARE, AND HEATING?: SOMEWHAT HARD

## 2024-03-13 ASSESSMENT — ENCOUNTER SYMPTOMS
VOMITING: 0
BLOOD IN STOOL: 0
TROUBLE SWALLOWING: 0
SHORTNESS OF BREATH: 0
CHEST TIGHTNESS: 0
ABDOMINAL DISTENTION: 0
EYE REDNESS: 0
VOICE CHANGE: 0
DIARRHEA: 0
EYE DISCHARGE: 0
CONSTIPATION: 0
SINUS PRESSURE: 0
RHINORRHEA: 0
EYE ITCHING: 0
NAUSEA: 0
BACK PAIN: 1
SORE THROAT: 0
WHEEZING: 0
EYE PAIN: 0
ANAL BLEEDING: 0
ABDOMINAL PAIN: 0
COUGH: 0

## 2024-03-13 NOTE — PROGRESS NOTES
Subjective:      Patient ID: Saba Yin is a 63 y.o. female.    HPI  Patient being seen for discussion of several medical issues.  She states that she and her  are now  and in the last 4 days she has been living with her daughter and states she is sleeping in some kind of a recliner and she believes that is why she has increased swelling in both legs which she has not had for a while.  Diabetes-she is on Basaglar and glipizide and her A1c's are usually okay and today she was 8.7 and she believes that has something to do with her being in a new environment and getting used to the routine of living with her daughter.  Stage III chronic kidney disease and she is still seeing.  Tobacco dependence-she did quit for about a year several years ago but she has smoking again probably due to anxiety.  She is considering starting GoLow along with diet for weight reduction    Nephrology  Review of Systems    Review of Systems   Constitutional:  Negative for fatigue and unexpected weight change.   HENT:  Negative for congestion, ear pain, hearing loss, nosebleeds, postnasal drip, rhinorrhea, sinus pressure, sneezing, sore throat, tinnitus, trouble swallowing and voice change.    Eyes:  Negative for pain, discharge, redness, itching and visual disturbance.   Respiratory:  Negative for cough, chest tightness, shortness of breath and wheezing.    Cardiovascular:  Positive for leg swelling. Negative for chest pain and palpitations.        See HPI   Gastrointestinal:  Negative for abdominal distention, abdominal pain, anal bleeding, blood in stool, constipation, diarrhea, nausea and vomiting.   Genitourinary:  Positive for frequency. Negative for dysuria, flank pain, hematuria, menstrual problem, pelvic pain, urgency and vaginal discharge.   Musculoskeletal:  Positive for arthralgias and back pain. Negative for gait problem, joint swelling, myalgias and neck pain.   Skin:  Negative for pallor and rash.

## 2024-04-10 DIAGNOSIS — M51.26 LUMBAR HERNIATED DISC: ICD-10-CM

## 2024-04-11 RX ORDER — OXYCODONE HYDROCHLORIDE 15 MG/1
15 TABLET ORAL EVERY 8 HOURS PRN
Qty: 90 TABLET | Refills: 0 | Status: SHIPPED | OUTPATIENT
Start: 2024-04-11 | End: 2024-05-11

## 2024-04-11 NOTE — TELEPHONE ENCOUNTER
Refill Request - Controlled Substance    CONFIRM preferred pharmacy with the patient.    If Mail Order Rx - Pend for 90 day refill.        Last Seen Department: 3/13/2024  Last Seen by PCP: 3/13/2024    Last Written: 3/12/2024, #90, 0 refills    Last UDS: 10/22/2021    Med Agreement Signed On: 4/17/2019    If no future appointment scheduled:  Review the last OV with PCP and review information for follow-up visit,  Route STAFF MESSAGE with patient name to the  Pool for scheduling with the following information:            -  Timing of next visit           -  Visit type ie Physical, OV, etc           -  Diagnoses/Reason ie. COPD, HTN - Do not use MEDICATION, Follow-up or CHECK UP - Give reason for visit        Next Appointment:   Future Appointments   Date Time Provider Department Center   6/26/2024  9:30 AM Ash Garber DO [facility] [place] [facility]       Message sent to  to schedule appt with patient?  YES      Requested Prescriptions     Pending Prescriptions Disp Refills    oxyCODONE (OXY-IR) 15 MG immediate release tablet 90 tablet 0     Sig: Take 1 tablet by mouth every 8 hours as needed for Pain for up to 30 days. Max Daily Amount: 45 mg

## 2024-04-19 DIAGNOSIS — E11.42 TYPE 2 DIABETES MELLITUS WITH DIABETIC POLYNEUROPATHY, WITHOUT LONG-TERM CURRENT USE OF INSULIN (HCC): ICD-10-CM

## 2024-04-19 DIAGNOSIS — G89.29 OTHER CHRONIC PAIN: ICD-10-CM

## 2024-04-19 RX ORDER — PREGABALIN 150 MG/1
CAPSULE ORAL
Qty: 90 CAPSULE | Refills: 2 | Status: SHIPPED | OUTPATIENT
Start: 2024-04-19 | End: 2024-09-08

## 2024-04-19 NOTE — TELEPHONE ENCOUNTER
Refill Request - Controlled Substance    CONFIRM preferred pharmacy with the patient.    If Mail Order Rx - Pend for 90 day refill.        Last Seen Department: 3/13/2024  Last Seen by PCP: 3/13/2024    Last Written: 10/23/2023    Last UDS: 10/22/2021    Med Agreement Signed On: 4/17/2019    If no future appointment scheduled:  Review the last OV with PCP and review information for follow-up visit,  Route STAFF MESSAGE with patient name to the  Pool for scheduling with the following information:            -  Timing of next visit           -  Visit type ie Physical, OV, etc           -  Diagnoses/Reason ie. COPD, HTN - Do not use MEDICATION, Follow-up or CHECK UP - Give reason for visit        Next Appointment:   Future Appointments   Date Time Provider Department Center   6/26/2024  9:30 AM Ash Garber, DO [facility] [place] [facility]       Message sent to  to schedule appt with patient?  NO      Requested Prescriptions     Pending Prescriptions Disp Refills    pregabalin (LYRICA) 150 MG capsule 90 capsule 5     Sig: TAKE ONE CAPSULE BY MOUTH THREE TIMES A DAY

## 2024-04-20 RX ORDER — INSULIN GLARGINE 100 [IU]/ML
INJECTION, SOLUTION SUBCUTANEOUS
Qty: 3 ML | Refills: 1 | Status: SHIPPED | OUTPATIENT
Start: 2024-04-20

## 2024-04-20 NOTE — TELEPHONE ENCOUNTER
Refill Request     CONFIRM preferred pharmacy with the patient.    If Mail Order Rx - Pend for 90 day refill.      Last Seen: Last Seen Department: 3/13/2024  Last Seen by PCP: 3/13/2024    Last Written: 2/15/2024    If no future appointment scheduled:  Review the last OV with PCP and review information for follow-up visit,  Route STAFF MESSAGE with patient name to the  Pool for scheduling with the following information:            -  Timing of next visit           -  Visit type ie Physical, OV, etc           -  Diagnoses/Reason ie. COPD, HTN - Do not use MEDICATION, Follow-up or CHECK UP - Give reason for visit      Next Appointment:   Future Appointments   Date Time Provider Department Center   6/26/2024  9:30 AM Ash Garber, DO [facility] [place]       Message sent to  to schedule appt with patient?  NO      Requested Prescriptions     Pending Prescriptions Disp Refills    insulin glargine (BASAGLAR KWIKPEN) 100 UNIT/ML injection pen 3 mL 1     Sig: INJECT TEN UNITS UNDER THE SKIN ONCE NIGHTLY

## 2024-05-11 DIAGNOSIS — M51.26 LUMBAR HERNIATED DISC: ICD-10-CM

## 2024-05-12 NOTE — TELEPHONE ENCOUNTER
Refill Request - Controlled Substance    CONFIRM preferred pharmacy with the patient.    If Mail Order Rx - Pend for 90 day refill.        Last Seen Department: 3/13/2024  Last Seen by PCP: 3/13/2024    Last Written: Oxycodone 4/11/24 90 tabs 0 refills     Last UDS: 10/22/21    Med Agreement Signed On: 4/17/19    If no future appointment scheduled:  Review the last OV with PCP and review information for follow-up visit,  Route STAFF MESSAGE with patient name to the  Pool for scheduling with the following information:            -  Timing of next visit           -  Visit type ie Physical, OV, etc           -  Diagnoses/Reason ie. COPD, HTN - Do not use MEDICATION, Follow-up or CHECK UP - Give reason for visit        Next Appointment:   Future Appointments   Date Time Provider Department Center   6/26/2024  9:30 AM Ash Garber DO [facility] [place] [facility]       Message sent to  to schedule appt with patient?  NO      Requested Prescriptions     Pending Prescriptions Disp Refills    oxyCODONE (OXY-IR) 15 MG immediate release tablet 90 tablet 0     Sig: Take 1 tablet by mouth every 8 hours as needed for Pain for up to 30 days. Max Daily Amount: 45 mg

## 2024-05-13 RX ORDER — OXYCODONE HYDROCHLORIDE 15 MG/1
15 TABLET ORAL EVERY 8 HOURS PRN
Qty: 90 TABLET | Refills: 0 | Status: SHIPPED | OUTPATIENT
Start: 2024-05-13 | End: 2024-06-12

## 2024-05-16 ENCOUNTER — TELEPHONE (OUTPATIENT)
Dept: ADMINISTRATIVE | Age: 64
End: 2024-05-16

## 2024-05-16 NOTE — TELEPHONE ENCOUNTER
Submitted PA for oxyCODONE HCl 15MG tablets   [facility]ia [facility]: [id number]  STATUS: PENDING.    Follow up done daily; if no decision with in three days we will refax.  If another three days goes by with no decision will call the insurance for status.

## 2024-06-11 DIAGNOSIS — M51.26 LUMBAR HERNIATED DISC: ICD-10-CM

## 2024-06-12 RX ORDER — OXYCODONE HYDROCHLORIDE 15 MG/1
15 TABLET ORAL EVERY 8 HOURS PRN
Qty: 90 TABLET | Refills: 0 | Status: SHIPPED | OUTPATIENT
Start: 2024-06-12 | End: 2024-07-12

## 2024-06-12 NOTE — TELEPHONE ENCOUNTER
Refill Request - Controlled Substance    CONFIRM preferred pharmacy with the patient.    If Mail Order Rx - Pend for 90 day refill.        Last Seen Department: 3/13/2024  Last Seen by PCP: Visit date not found    Last Written: 5/13/24 90 with no refills     Last UDS: 10/22/21     Med Agreement Signed On: 4/17/19    If no future appointment scheduled:  Review the last OV with PCP and review information for follow-up visit,  Route STAFF MESSAGE with patient name to the  Pool for scheduling with the following information:            -  Timing of next visit           -  Visit type ie Physical, OV, etc           -  Diagnoses/Reason ie. COPD, HTN - Do not use MEDICATION, Follow-up or CHECK UP - Give reason for visit        Next Appointment:   Future Appointments   Date Time Provider Department Center   6/26/2024  9:30 AM Ash aGrber, DO [facility] [place] [facility]       Message sent to  to schedule appt with patient?  NO      Requested Prescriptions     Pending Prescriptions Disp Refills    oxyCODONE (OXY-IR) 15 MG immediate release tablet 90 tablet 0     Sig: Take 1 tablet by mouth every 8 hours as needed for Pain for up to 30 days. Max Daily Amount: 45 mg

## 2024-06-13 DIAGNOSIS — E11.42 TYPE 2 DIABETES MELLITUS WITH DIABETIC POLYNEUROPATHY, WITHOUT LONG-TERM CURRENT USE OF INSULIN (HCC): ICD-10-CM

## 2024-06-13 RX ORDER — INSULIN GLARGINE 100 [IU]/ML
INJECTION, SOLUTION SUBCUTANEOUS
Qty: 3 ML | Refills: 5 | Status: SHIPPED | OUTPATIENT
Start: 2024-06-13

## 2024-06-13 NOTE — TELEPHONE ENCOUNTER
Refill Request     CONFIRM preferred pharmacy with the patient.    If Mail Order Rx - Pend for 90 day refill.      Last Seen: Last Seen Department: 3/13/2024  Last Seen by PCP: 3/13/2024    Last Written: 4/20/24 3 ML WITH 1 REFILL     If no future appointment scheduled:  Review the last OV with PCP and review information for follow-up visit,  Route STAFF MESSAGE with patient name to the  Pool for scheduling with the following information:            -  Timing of next visit           -  Visit type ie Physical, OV, etc           -  Diagnoses/Reason ie. COPD, HTN - Do not use MEDICATION, Follow-up or CHECK UP - Give reason for visit      Next Appointment:   Future Appointments   Date Time Provider Department Center   6/26/2024  9:30 AM Ash Garber, DO [facility] [place]       Message sent to  to schedule appt with patient?  NO      Requested Prescriptions     Pending Prescriptions Disp Refills    insulin glargine (BASAGLAR KWIKPEN) 100 UNIT/ML injection pen [Pharmacy Med Name: BASAGLAR 100 UNIT/ML KWIKPEN] 3 mL 1     Sig: INJECT 10 UNITS UNDER THE SKIN ONCE NIGHTLY

## 2024-07-11 DIAGNOSIS — M51.26 LUMBAR HERNIATED DISC: ICD-10-CM

## 2024-07-12 RX ORDER — OXYCODONE HYDROCHLORIDE 15 MG/1
15 TABLET ORAL EVERY 8 HOURS PRN
Qty: 90 TABLET | Refills: 0 | Status: SHIPPED | OUTPATIENT
Start: 2024-07-12 | End: 2024-08-11

## 2024-07-12 NOTE — TELEPHONE ENCOUNTER
Refill Request - Controlled Substance    CONFIRM preferred pharmacy with the patient.    If Mail Order Rx - Pend for 90 day refill.        Last Seen Department: 3/13/2024  Last Seen by PCP: 3/13/2024    Last Written: 6/12/24 90 with no refills     Last UDS: 10/22/21    Med Agreement Signed On: 4/17/19    If no future appointment scheduled:  Review the last OV with PCP and review information for follow-up visit,  Route STAFF MESSAGE with patient name to the  Pool for scheduling with the following information:            -  Timing of next visit           -  Visit type ie Physical, OV, etc           -  Diagnoses/Reason ie. COPD, HTN - Do not use MEDICATION, Follow-up or CHECK UP - Give reason for visit        Next Appointment:   Future Appointments   Date Time Provider Department Center   10/16/2024  2:00 PM Ash Garber, DO [facility] [place] [facility]       Message sent to  to schedule appt with patient?  NO      Requested Prescriptions     Pending Prescriptions Disp Refills    oxyCODONE (OXY-IR) 15 MG immediate release tablet 90 tablet 0     Sig: Take 1 tablet by mouth every 8 hours as needed for Pain for up to 30 days. Max Daily Amount: 45 mg

## 2024-07-15 DIAGNOSIS — G89.29 OTHER CHRONIC PAIN: ICD-10-CM

## 2024-07-15 DIAGNOSIS — E11.42 TYPE 2 DIABETES MELLITUS WITH DIABETIC POLYNEUROPATHY, WITHOUT LONG-TERM CURRENT USE OF INSULIN (HCC): ICD-10-CM

## 2024-07-15 RX ORDER — INSULIN GLARGINE 100 [IU]/ML
INJECTION, SOLUTION SUBCUTANEOUS
Qty: 3 ML | Refills: 5 | Status: SHIPPED | OUTPATIENT
Start: 2024-07-15

## 2024-07-15 RX ORDER — PREGABALIN 150 MG/1
CAPSULE ORAL
Qty: 90 CAPSULE | Refills: 3 | Status: SHIPPED | OUTPATIENT
Start: 2024-07-15 | End: 2024-10-15

## 2024-07-15 NOTE — TELEPHONE ENCOUNTER
Refill Request     CONFIRM preferred pharmacy with the patient.    If Mail Order Rx - Pend for 90 day refill.      Last Seen: Last Seen Department: 3/13/2024  Last Seen by PCP: 3/13/2024    Last Written: 06/13/2024 3 mL 5 refills     If no future appointment scheduled:  Review the last OV with PCP and review information for follow-up visit,  Route STAFF MESSAGE with patient name to the  Pool for scheduling with the following information:            -  Timing of next visit           -  Visit type ie Physical, OV, etc           -  Diagnoses/Reason ie. COPD, HTN - Do not use MEDICATION, Follow-up or CHECK UP - Give reason for visit      Next Appointment:   Future Appointments   Date Time Provider Department Center   10/16/2024  2:00 PM Ash Garber, DO [facility] [place]       Message sent to  to schedule appt with patient?  NO      Requested Prescriptions     Pending Prescriptions Disp Refills    insulin glargine (BASAGLAR KWIKPEN) 100 UNIT/ML injection pen 3 mL 5     Sig: INJECT 10 UNITS UNDER THE SKIN ONCE NIGHTLY

## 2024-07-15 NOTE — TELEPHONE ENCOUNTER
Refill Request - Controlled Substance    CONFIRM preferred pharmacy with the patient.    If Mail Order Rx - Pend for 90 day refill.        Last Seen Department: 3/13/2024    Last Seen by PCP: 3/13/2024    Last Written: 4/19/24 90 capsule 2 refills    Last UDS: 10/22/21    Med Agreement Signed On: 4/17/19    If no future appointment scheduled:  Review the last OV with PCP and review information for follow-up visit,  Route STAFF MESSAGE with patient name to the  Pool for scheduling with the following information:            -  Timing of next visit           -  Visit type ie Physical, OV, etc           -  Diagnoses/Reason ie. COPD, HTN - Do not use MEDICATION, Follow-up or CHECK UP - Give reason for visit        Next Appointment: 10/16/24  Future Appointments   Date Time Provider Department Center   10/16/2024  2:00 PM Ash Garber DO [facility] [place] [facility]       Message sent to  to schedule appt with patient?  NO      Requested Prescriptions     Pending Prescriptions Disp Refills    pregabalin (LYRICA) 150 MG capsule [Pharmacy Med Name: PREGABALIN 150 MG CAPSULE] 90 capsule      Sig: TAKE 1 CAPSULE BY MOUTH 3 TIMES A DAY

## 2024-07-22 DIAGNOSIS — E11.42 TYPE 2 DIABETES MELLITUS WITH DIABETIC POLYNEUROPATHY, WITHOUT LONG-TERM CURRENT USE OF INSULIN (HCC): ICD-10-CM

## 2024-07-22 RX ORDER — GLIPIZIDE 10 MG/1
TABLET, FILM COATED, EXTENDED RELEASE ORAL
Qty: 180 TABLET | Refills: 1 | Status: SHIPPED | OUTPATIENT
Start: 2024-07-22

## 2024-07-22 NOTE — TELEPHONE ENCOUNTER
Refill Request     CONFIRM preferred pharmacy with the patient.    If Mail Order Rx - Pend for 90 day refill.      Last Seen: Last Seen Department: 3/13/2024  Last Seen by PCP: 3/13/2024    Last Written: 10/23/23 #180 - 1 refill    If no future appointment scheduled:  Review the last OV with PCP and review information for follow-up visit,  Route STAFF MESSAGE with patient name to the  Pool for scheduling with the following information:            -  Timing of next visit           -  Visit type ie Physical, OV, etc           -  Diagnoses/Reason ie. COPD, HTN - Do not use MEDICATION, Follow-up or CHECK UP - Give reason for visit      Next Appointment:   Future Appointments   Date Time Provider Department Center   10/16/2024  2:00 PM Ash Garber, DO [facility] [place]       Message sent to  to schedule appt with patient?  NO      Requested Prescriptions     Pending Prescriptions Disp Refills    glipiZIDE (GLUCOTROL XL) 10 MG extended release tablet 180 tablet 1     Sig: TAKE TWO TABLETS BY MOUTH EVERY MORNING

## 2024-08-11 DIAGNOSIS — M51.26 LUMBAR HERNIATED DISC: ICD-10-CM

## 2024-08-11 RX ORDER — OXYCODONE HYDROCHLORIDE 15 MG/1
15 TABLET ORAL EVERY 8 HOURS PRN
Qty: 90 TABLET | Refills: 0 | Status: SHIPPED | OUTPATIENT
Start: 2024-08-11 | End: 2024-09-10

## 2024-08-14 DIAGNOSIS — E11.42 TYPE 2 DIABETES MELLITUS WITH DIABETIC POLYNEUROPATHY, WITHOUT LONG-TERM CURRENT USE OF INSULIN (HCC): ICD-10-CM

## 2024-08-15 RX ORDER — ONDANSETRON HYDROCHLORIDE 8 MG/1
8 TABLET, FILM COATED ORAL EVERY 12 HOURS PRN
Qty: 60 TABLET | Refills: 0 | Status: SHIPPED | OUTPATIENT
Start: 2024-08-15

## 2024-08-15 NOTE — TELEPHONE ENCOUNTER
Refill Request     CONFIRM preferred pharmacy with the patient.    If Mail Order Rx - Pend for 90 day refill.      Last Seen: Last Seen Department: 3/13/2024  Last Seen by PCP: 3/13/2024    Last Written: 6/5/23 60 with no refills     If no future appointment scheduled:  Review the last OV with PCP and review information for follow-up visit,  Route STAFF MESSAGE with patient name to the  Pool for scheduling with the following information:            -  Timing of next visit           -  Visit type ie Physical, OV, etc           -  Diagnoses/Reason ie. COPD, HTN - Do not use MEDICATION, Follow-up or CHECK UP - Give reason for visit      Next Appointment:   Future Appointments   Date Time Provider Department Center   10/16/2024  2:00 PM Ash Garber, DO [facility]       Message sent to  to schedule appt with patient?  NO      Requested Prescriptions     Pending Prescriptions Disp Refills    ondansetron (ZOFRAN) 8 MG tablet 60 tablet 0     Sig: Take 1 tablet by mouth every 12 hours as needed for Nausea or Vomiting

## 2024-09-09 DIAGNOSIS — M51.26 LUMBAR HERNIATED DISC: ICD-10-CM

## 2024-09-10 RX ORDER — OXYCODONE HYDROCHLORIDE 15 MG/1
15 TABLET ORAL EVERY 8 HOURS PRN
Qty: 90 TABLET | Refills: 0 | Status: SHIPPED | OUTPATIENT
Start: 2024-09-10 | End: 2024-10-10

## 2024-10-07 ENCOUNTER — TELEPHONE (OUTPATIENT)
Dept: FAMILY MEDICINE CLINIC | Age: 64
End: 2024-10-07

## 2024-10-07 NOTE — TELEPHONE ENCOUNTER
Care Transitions Initial Follow Up Call    Outreach made within 2 business days of discharge: Yes    Patient: Saba Yin Patient : 1960   MRN: [id number]  Reason for Admission: Fracture of left fibula  Discharge Date: 23       Spoke with: Madie    Discharge department/facility: [facility] Interactive Patient Contact:  Was patient able to fill all prescriptions: Yes  Was patient instructed to bring all medications to the follow-up visit: Yes  Is patient taking all medications as directed in the discharge summary? Yes  Does patient understand their discharge instructions: Yes  Does patient have questions or concerns that need addressed prior to 7-14 day follow up office visit: no        Scheduled appointment with PCP within 7-14 days    Follow Up  Future Appointments   Date Time Provider Department Center   10/16/2024  2:00 PM Ash Garber DO [facility]       Melonie Acosta LPN

## 2024-10-10 DIAGNOSIS — M51.26 LUMBAR HERNIATED DISC: ICD-10-CM

## 2024-10-10 RX ORDER — OXYCODONE HYDROCHLORIDE 15 MG/1
15 TABLET ORAL EVERY 6 HOURS PRN
Qty: 120 TABLET | Refills: 0 | Status: SHIPPED | OUTPATIENT
Start: 2024-10-10 | End: 2024-11-09

## 2024-10-10 NOTE — TELEPHONE ENCOUNTER
PLEASE SEE PATIENTS COMMENT UNDER RX  REQUESTING IT TO BE EVERY 6 HOURS INSTEAD OF 8. I DID CHANGE THE QTY

## 2024-10-16 ENCOUNTER — TELEMEDICINE (OUTPATIENT)
Dept: FAMILY MEDICINE CLINIC | Age: 64
End: 2024-10-16

## 2024-10-16 DIAGNOSIS — M51.26 LUMBAR HERNIATED DISC: Primary | ICD-10-CM

## 2024-10-16 NOTE — PROGRESS NOTES
Saba Yin, was evaluated through a synchronous (real-time) audio-video encounter. The patient (or guardian if applicable) is aware that this is a billable service, which includes applicable co-pays. This Virtual Visit was conducted with patient's (and/or legal guardian's) consent. Patient identification was verified, and a caregiver was present when appropriate.   The patient was located at Home: [address]  [address]  [place] [address]  Provider was located at Facility (Appt Dept): [address]  [address]  [place] [address]  Confirm you are appropriately licensed, registered, or certified to deliver care in the state where the patient is located as indicated above. If you are not or unsure, please re-schedule the visit: Yes, I confirm.     Saba Yin (:  1960) is a Established patient, presenting virtually for evaluation of the following:      Below is the assessment and plan developed based on review of pertinent history, physical exam, labs, studies, and medications.     Assessment & Plan      No follow-ups on file.       Subjective   Patient in for a video visit due to the fact that she fell on  and fractured her fibula and has had surgery and is still not allowed to put full weightbearing on that right leg.  She does have a splint boot.  Fortunately that fall and not being able to do much as not really affected her chronic low back pain.  She is still seeing the orthopedic specialist.  This is a video visit and I am unable to examine her back or her leg.      Review of Systems   Musculoskeletal:         See HPI          Objective   Patient-Reported Vitals  No data recorded     Physical Exam  Constitutional:       General: She is not in acute distress.     Appearance: Normal appearance. She is obese. She is not ill-appearing.   Neurological:      Mental Status: She is alert and oriented to person, place, and time.   Psychiatric:         Mood and Affect:

## 2024-10-21 DIAGNOSIS — E11.42 TYPE 2 DIABETES MELLITUS WITH DIABETIC POLYNEUROPATHY, WITHOUT LONG-TERM CURRENT USE OF INSULIN (HCC): ICD-10-CM

## 2024-10-21 DIAGNOSIS — E78.49 OTHER HYPERLIPIDEMIA: ICD-10-CM

## 2024-10-22 DIAGNOSIS — I10 ESSENTIAL HYPERTENSION: Primary | ICD-10-CM

## 2024-10-22 RX ORDER — GLIPIZIDE 10 MG/1
TABLET, FILM COATED, EXTENDED RELEASE ORAL
Qty: 180 TABLET | Refills: 0 | Status: SHIPPED | OUTPATIENT
Start: 2024-10-22

## 2024-10-22 RX ORDER — PRAVASTATIN SODIUM 20 MG
TABLET ORAL
Qty: 90 TABLET | Refills: 0 | Status: SHIPPED | OUTPATIENT
Start: 2024-10-22

## 2024-10-22 NOTE — TELEPHONE ENCOUNTER
Refill Request     CONFIRM preferred pharmacy with the patient.    If Mail Order Rx - Pend for 90 day refill.      Last Seen: Last Seen Department: 10/16/2024  Last Seen by PCP: Visit date not found    Last Written: 04/25/2022 90 tab 0 refills     If no future appointment scheduled:  Review the last OV with PCP and review information for follow-up visit,  Route STAFF MESSAGE with patient name to the  Pool for scheduling with the following information:            -  Timing of next visit           -  Visit type ie Physical, OV, etc           -  Diagnoses/Reason ie. COPD, HTN - Do not use MEDICATION, Follow-up or CHECK UP - Give reason for visit      Next Appointment:   Future Appointments   Date Time Provider Department Center   1/20/2025  1:30 PM Ash Garber, DO [facility]       Message sent to  to schedule appt with patient?  NO      Requested Prescriptions     Pending Prescriptions Disp Refills    pravastatin (PRAVACHOL) 20 MG tablet 90 tablet 0     Sig: TAKE ONE TABLET BY MOUTH DAILY

## 2024-10-22 NOTE — TELEPHONE ENCOUNTER
Refill Request     CONFIRM preferred pharmacy with the patient.    If Mail Order Rx - Pend for 90 day refill.      Last Seen: Last Seen Department: 10/16/2024  Last Seen by PCP: Visit date not found    Last Written: 07/22/2024 180 tab 1 refills     If no future appointment scheduled:  Review the last OV with PCP and review information for follow-up visit,  Route STAFF MESSAGE with patient name to the  Pool for scheduling with the following information:            -  Timing of next visit           -  Visit type ie Physical, OV, etc           -  Diagnoses/Reason ie. COPD, HTN - Do not use MEDICATION, Follow-up or CHECK UP - Give reason for visit      Next Appointment:   Future Appointments   Date Time Provider Department Center   1/20/2025  1:30 PM Ash Garber, DO [facility]       Message sent to  to schedule appt with patient?  NO      Requested Prescriptions     Pending Prescriptions Disp Refills    glipiZIDE (GLUCOTROL XL) 10 MG extended release tablet 180 tablet 1     Sig: TAKE TWO TABLETS BY MOUTH EVERY MORNING

## 2024-11-09 DIAGNOSIS — G89.29 OTHER CHRONIC PAIN: ICD-10-CM

## 2024-11-10 DIAGNOSIS — G89.29 OTHER CHRONIC PAIN: ICD-10-CM

## 2024-11-10 DIAGNOSIS — M51.26 LUMBAR HERNIATED DISC: ICD-10-CM

## 2024-11-10 RX ORDER — PREGABALIN 150 MG/1
CAPSULE ORAL
Qty: 90 CAPSULE | Refills: 3 | OUTPATIENT
Start: 2024-11-10 | End: 2024-12-10

## 2024-11-10 RX ORDER — OXYCODONE HYDROCHLORIDE 15 MG/1
15 TABLET ORAL EVERY 8 HOURS PRN
Qty: 90 TABLET | Refills: 0 | Status: SHIPPED | OUTPATIENT
Start: 2024-11-10 | End: 2024-12-10

## 2024-11-10 RX ORDER — PREGABALIN 150 MG/1
CAPSULE ORAL
Qty: 90 CAPSULE | Refills: 2 | Status: SHIPPED | OUTPATIENT
Start: 2024-11-10 | End: 2025-02-10

## 2024-11-10 NOTE — TELEPHONE ENCOUNTER
Refill Request - Controlled Substance    CONFIRM preferred pharmacy with the patient.    If Mail Order Rx - Pend for 90 day refill.        Last Seen Department: 10/16/2024  Last Seen by PCP: 10/16/2024    Last Written: 7/15/24 90 cap 3 refills    Last UDS: 10/22/21    Med Agreement Signed On: 4/16/2019    If no future appointment scheduled:  Review the last OV with PCP and review information for follow-up visit,  Route STAFF MESSAGE with patient name to the  Pool for scheduling with the following information:            -  Timing of next visit           -  Visit type ie Physical, OV, etc           -  Diagnoses/Reason ie. COPD, HTN - Do not use MEDICATION, Follow-up or CHECK UP - Give reason for visit        Next Appointment:   Future Appointments   Date Time Provider Department Center   1/20/2025  1:30 PM Ash Garber, DO [facility]       Message sent to  to schedule appt with patient?  NO      Requested Prescriptions     Pending Prescriptions Disp Refills    pregabalin (LYRICA) 150 MG capsule [Pharmacy Med Name: PREGABALIN 150 MG CAPSULE] 90 capsule      Sig: TAKE 1 CAPSULE BY MOUTH 3 TIMES A DAY

## 2024-12-11 DIAGNOSIS — M51.26 LUMBAR HERNIATED DISC: ICD-10-CM

## 2024-12-11 RX ORDER — OXYCODONE HYDROCHLORIDE 15 MG/1
15 TABLET ORAL EVERY 8 HOURS PRN
Qty: 90 TABLET | Refills: 0 | Status: SHIPPED | OUTPATIENT
Start: 2024-12-11 | End: 2025-01-10

## 2024-12-11 NOTE — TELEPHONE ENCOUNTER
Refill Request - Controlled Substance    CONFIRM preferred pharmacy with the patient.    If Mail Order Rx - Pend for 90 day refill.        Last Seen Department: 10/16/2024  Last Seen by PCP: 10/16/2024    Last Written: 11/10/2024    Last UDS: 10/22/2021    Med Agreement Signed On: 4/17/19    If no future appointment scheduled:  Review the last OV with PCP and review information for follow-up visit,  Route STAFF MESSAGE with patient name to the  Pool for scheduling with the following information:            -  Timing of next visit           -  Visit type ie Physical, OV, etc           -  Diagnoses/Reason ie. COPD, HTN - Do not use MEDICATION, Follow-up or CHECK UP - Give reason for visit        Next Appointment:   Future Appointments   Date Time Provider Department Center   1/20/2025  1:30 PM Ash Garber DO [facility]       Message sent to  to schedule appt with patient?  NO      Requested Prescriptions     Pending Prescriptions Disp Refills    oxyCODONE (OXY-IR) 15 MG immediate release tablet 90 tablet 0     Sig: Take 1 tablet by mouth every 8 hours as needed for Pain for up to 30 days. Max Daily Amount: 45 mg

## 2025-01-09 DIAGNOSIS — M51.26 LUMBAR HERNIATED DISC: ICD-10-CM

## 2025-01-10 RX ORDER — OXYCODONE HYDROCHLORIDE 15 MG/1
15 TABLET ORAL 2 TIMES DAILY PRN
Qty: 60 TABLET | Refills: 0 | Status: SHIPPED | OUTPATIENT
Start: 2025-01-10 | End: 2025-02-09

## 2025-01-10 NOTE — TELEPHONE ENCOUNTER
Refill Request - Controlled Substance    CONFIRM preferred pharmacy with the patient.    If Mail Order Rx - Pend for 90 day refill.        Last Seen Department: 10/16/2024  Last Seen by PCP: 10/16/2024    Last Written: 12/11/24 90 with no refills     Last UDS: 10/22/21     Med Agreement Signed On: 4/17/19    If no future appointment scheduled:  Review the last OV with PCP and review information for follow-up visit,  Route STAFF MESSAGE with patient name to the  Pool for scheduling with the following information:            -  Timing of next visit           -  Visit type ie Physical, OV, etc           -  Diagnoses/Reason ie. COPD, HTN - Do not use MEDICATION, Follow-up or CHECK UP - Give reason for visit        Next Appointment:   Future Appointments   Date Time Provider Department Center   1/20/2025  1:30 PM Ash Garber DO [facility]       Message sent to  to schedule appt with patient?  NO      Requested Prescriptions     Pending Prescriptions Disp Refills    oxyCODONE (OXY-IR) 15 MG immediate release tablet 90 tablet 0     Sig: Take 1 tablet by mouth every 8 hours as needed for Pain for up to 30 days. Max Daily Amount: 45 mg

## 2025-01-20 ENCOUNTER — OFFICE VISIT (OUTPATIENT)
Dept: FAMILY MEDICINE CLINIC | Age: 65
End: 2025-01-20
Payer: MEDICARE

## 2025-01-20 VITALS
HEIGHT: 61 IN | OXYGEN SATURATION: 97 % | HEART RATE: 107 BPM | SYSTOLIC BLOOD PRESSURE: 140 MMHG | WEIGHT: 289 LBS | BODY MASS INDEX: 54.56 KG/M2 | DIASTOLIC BLOOD PRESSURE: 85 MMHG

## 2025-01-20 DIAGNOSIS — M48.061 FORAMINAL STENOSIS OF LUMBAR REGION: ICD-10-CM

## 2025-01-20 DIAGNOSIS — E11.42 TYPE 2 DIABETES MELLITUS WITH DIABETIC POLYNEUROPATHY, WITHOUT LONG-TERM CURRENT USE OF INSULIN (HCC): Primary | ICD-10-CM

## 2025-01-20 DIAGNOSIS — Z91.81 AT HIGH RISK FOR FALLS: ICD-10-CM

## 2025-01-20 DIAGNOSIS — N18.32 STAGE 3B CHRONIC KIDNEY DISEASE (HCC): ICD-10-CM

## 2025-01-20 DIAGNOSIS — E78.49 OTHER HYPERLIPIDEMIA: ICD-10-CM

## 2025-01-20 DIAGNOSIS — I10 ESSENTIAL HYPERTENSION: ICD-10-CM

## 2025-01-20 LAB — HBA1C MFR BLD: 10.6 %

## 2025-01-20 PROCEDURE — 1090F PRES/ABSN URINE INCON ASSESS: CPT | Performed by: FAMILY MEDICINE

## 2025-01-20 PROCEDURE — G8400 PT W/DXA NO RESULTS DOC: HCPCS | Performed by: FAMILY MEDICINE

## 2025-01-20 PROCEDURE — G8417 CALC BMI ABV UP PARAM F/U: HCPCS | Performed by: FAMILY MEDICINE

## 2025-01-20 PROCEDURE — 2022F DILAT RTA XM EVC RTNOPTHY: CPT | Performed by: FAMILY MEDICINE

## 2025-01-20 PROCEDURE — 99214 OFFICE O/P EST MOD 30 MIN: CPT | Performed by: FAMILY MEDICINE

## 2025-01-20 PROCEDURE — 3079F DIAST BP 80-89 MM HG: CPT | Performed by: FAMILY MEDICINE

## 2025-01-20 PROCEDURE — 1123F ACP DISCUSS/DSCN MKR DOCD: CPT | Performed by: FAMILY MEDICINE

## 2025-01-20 PROCEDURE — 3046F HEMOGLOBIN A1C LEVEL >9.0%: CPT | Performed by: FAMILY MEDICINE

## 2025-01-20 PROCEDURE — 3077F SYST BP >= 140 MM HG: CPT | Performed by: FAMILY MEDICINE

## 2025-01-20 PROCEDURE — 4004F PT TOBACCO SCREEN RCVD TLK: CPT | Performed by: FAMILY MEDICINE

## 2025-01-20 PROCEDURE — G8427 DOCREV CUR MEDS BY ELIG CLIN: HCPCS | Performed by: FAMILY MEDICINE

## 2025-01-20 PROCEDURE — 83036 HEMOGLOBIN GLYCOSYLATED A1C: CPT | Performed by: FAMILY MEDICINE

## 2025-01-20 PROCEDURE — 3017F COLORECTAL CA SCREEN DOC REV: CPT | Performed by: FAMILY MEDICINE

## 2025-01-20 RX ORDER — PRAVASTATIN SODIUM 20 MG
TABLET ORAL
Qty: 90 TABLET | Refills: 3 | Status: SHIPPED | OUTPATIENT
Start: 2025-01-20

## 2025-01-20 SDOH — ECONOMIC STABILITY: FOOD INSECURITY: WITHIN THE PAST 12 MONTHS, THE FOOD YOU BOUGHT JUST DIDN'T LAST AND YOU DIDN'T HAVE MONEY TO GET MORE.: NEVER TRUE

## 2025-01-20 SDOH — ECONOMIC STABILITY: FOOD INSECURITY: WITHIN THE PAST 12 MONTHS, YOU WORRIED THAT YOUR FOOD WOULD RUN OUT BEFORE YOU GOT MONEY TO BUY MORE.: NEVER TRUE

## 2025-01-20 ASSESSMENT — PATIENT HEALTH QUESTIONNAIRE - PHQ9
SUM OF ALL RESPONSES TO PHQ QUESTIONS 1-9: 0
SUM OF ALL RESPONSES TO PHQ QUESTIONS 1-9: 0
2. FEELING DOWN, DEPRESSED OR HOPELESS: NOT AT ALL
1. LITTLE INTEREST OR PLEASURE IN DOING THINGS: NOT AT ALL
SUM OF ALL RESPONSES TO PHQ QUESTIONS 1-9: 0
SUM OF ALL RESPONSES TO PHQ9 QUESTIONS 1 & 2: 0
SUM OF ALL RESPONSES TO PHQ QUESTIONS 1-9: 0

## 2025-01-20 ASSESSMENT — ENCOUNTER SYMPTOMS
RHINORRHEA: 0
BACK PAIN: 1
WHEEZING: 0
COUGH: 0
ABDOMINAL DISTENTION: 0
SHORTNESS OF BREATH: 1
ABDOMINAL PAIN: 0
EYE PAIN: 0
TROUBLE SWALLOWING: 0
DIARRHEA: 0
VOMITING: 0
EYE DISCHARGE: 0
SINUS PRESSURE: 0
ANAL BLEEDING: 0
CHEST TIGHTNESS: 0
SORE THROAT: 0
EYE ITCHING: 0
NAUSEA: 0
CONSTIPATION: 0
BLOOD IN STOOL: 0
EYE REDNESS: 0
VOICE CHANGE: 0

## 2025-01-20 NOTE — PROGRESS NOTES
Subjective:      Patient ID: Saba Yin is a 64 y.o. female.    HPI  Patient in for checkup on several medical issues.  Diabetes-A1c today 10.6--she is using a regular glucometer and we will try to get her set up with a Dexcom or something similar.  Chronic pain-lumbar pain with right radiculopathy due to bilateral severe foraminal stenosis.        Review of Systems    Review of Systems   Constitutional:  Positive for fatigue. Negative for unexpected weight change.   HENT:  Negative for congestion, ear pain, hearing loss, nosebleeds, postnasal drip, rhinorrhea, sinus pressure, sneezing, sore throat, tinnitus, trouble swallowing and voice change.    Eyes:  Negative for pain, discharge, redness, itching and visual disturbance.   Respiratory:  Positive for shortness of breath. Negative for cough, chest tightness and wheezing.         Still smoking   Cardiovascular:  Negative for chest pain, palpitations and leg swelling.   Gastrointestinal:  Negative for abdominal distention, abdominal pain, anal bleeding, blood in stool, constipation, diarrhea, nausea and vomiting.   Genitourinary:  Negative for dysuria, flank pain, frequency, hematuria, menstrual problem, pelvic pain, urgency and vaginal discharge.   Musculoskeletal:  Positive for arthralgias, back pain and gait problem. Negative for joint swelling, myalgias and neck pain.   Skin:  Negative for pallor and rash.   Neurological:  Negative for dizziness, tremors, seizures, weakness, light-headedness, numbness and headaches.   Hematological:  Negative for adenopathy. Does not bruise/bleed easily.   Psychiatric/Behavioral:  Negative for agitation, confusion, decreased concentration and sleep disturbance. The patient is not nervous/anxious and is not hyperactive.        Objective:   Physical Exam      Physical Exam  Constitutional:       General: She is not in acute distress.     Appearance: Normal appearance. She is obese. She is not ill-appearing.

## 2025-01-20 NOTE — TELEPHONE ENCOUNTER
Refill Request     CONFIRM preferred pharmacy with the patient.    If Mail Order Rx - Pend for 90 day refill.      Last Seen: Last Seen Department: 10/16/2024  Last Seen by PCP: 10/16/2024    Last Written: 10/22/2024    If no future appointment scheduled:  Review the last OV with PCP and review information for follow-up visit,  Route STAFF MESSAGE with patient name to the  Pool for scheduling with the following information:            -  Timing of next visit           -  Visit type ie Physical, OV, etc           -  Diagnoses/Reason ie. COPD, HTN - Do not use MEDICATION, Follow-up or CHECK UP - Give reason for visit      Next Appointment:   Future Appointments   Date Time Provider Department Center   1/20/2025  1:30 PM Ash Garber, DO [facility]       Message sent to  to schedule appt with patient?  NO      Requested Prescriptions     Pending Prescriptions Disp Refills    pravastatin (PRAVACHOL) 20 MG tablet [Pharmacy Med Name: PRAVASTATIN SODIUM 20 MG TAB] 90 tablet 0     Sig: TAKE 1 TABLET BY MOUTH DAILY

## 2025-01-21 LAB
CREAT UR-MCNC: 52.4 MG/DL (ref 28–259)
MICROALBUMIN UR DL<=1MG/L-MCNC: 53.2 MG/DL
MICROALBUMIN/CREAT UR: 1015.3 MG/G (ref 0–30)

## 2025-01-23 DIAGNOSIS — R80.9 PROTEINURIA, UNSPECIFIED TYPE: Primary | ICD-10-CM

## 2025-01-23 NOTE — RESULT ENCOUNTER NOTE
Please tell her we need to get her back to her nephrologist because of protein in the urine.  I will put a referral in for --thank you

## 2025-01-24 ENCOUNTER — TELEPHONE (OUTPATIENT)
Dept: FAMILY MEDICINE CLINIC | Age: 65
End: 2025-01-24

## 2025-01-24 NOTE — TELEPHONE ENCOUNTER
Order for Konrad 3 CGM system submitted to [employer] via [employer] online portal.     Called patient informed of order and to watch for their call. I will continue to monitor and update patient on order status as needed.

## 2025-01-27 NOTE — TELEPHONE ENCOUNTER
Informed pt to call us to schedule a visit if she has any questions about getting everything set up.

## 2025-01-31 DIAGNOSIS — M48.061 FORAMINAL STENOSIS OF LUMBAR REGION: Primary | ICD-10-CM

## 2025-01-31 DIAGNOSIS — M51.26 LUMBAR HERNIATED DISC: ICD-10-CM

## 2025-01-31 RX ORDER — OXYCODONE HYDROCHLORIDE 15 MG/1
15 TABLET ORAL 2 TIMES DAILY PRN
Qty: 60 TABLET | Refills: 0 | OUTPATIENT
Start: 2025-01-31 | End: 2025-03-02

## 2025-01-31 RX ORDER — OXYCODONE HYDROCHLORIDE 10 MG/1
10 TABLET ORAL EVERY 8 HOURS PRN
Qty: 90 TABLET | Refills: 0 | Status: SHIPPED | OUTPATIENT
Start: 2025-01-31 | End: 2025-03-02

## 2025-01-31 NOTE — TELEPHONE ENCOUNTER
Refill Request - Controlled Substance    CONFIRM preferred pharmacy with the patient.    If Mail Order Rx - Pend for 90 day refill.        Last Seen Department: 1/20/2025    Last Seen by PCP: 1/20/2025    Last Written: 1/10/25 60 tablet 0 refills    Last UDS: 10/22/21    Med Agreement Signed On: 9/12/22    If no future appointment scheduled:  Review the last OV with PCP and review information for follow-up visit,  Route STAFF MESSAGE with patient name to the  Pool for scheduling with the following information:            -  Timing of next visit           -  Visit type ie Physical, OV, etc           -  Diagnoses/Reason ie. COPD, HTN - Do not use MEDICATION, Follow-up or CHECK UP - Give reason for visit        Next Appointment: 4/28/25  Future Appointments   Date Time Provider Department Center   2/7/2025  1:00 PM Luis Carlos Camp MD [facility]   4/28/2025 10:30 AM Diana Benoit MD [facility] DEP       Message sent to  to schedule appt with patient?  NO      Requested Prescriptions     Pending Prescriptions Disp Refills    oxyCODONE (OXY-IR) 15 MG immediate release tablet 60 tablet 0     Sig: Take 1 tablet by mouth 2 times daily as needed for Pain for up to 30 days. NEW # OF TABS Max Daily Amount: 30 mg

## 2025-02-03 NOTE — TELEPHONE ENCOUNTER
Saba Yin \"Madie\"   [facility] (supporting Ash Garber DO)         2/3/25 10:39 AM  \"Yes but  you told me to cut the rest in half to make them for my middle one of the day did you forget ?that you told me when i ran out to let you know and you would call in the 30 mg 3 times a day\"

## 2025-02-06 DIAGNOSIS — G89.29 OTHER CHRONIC PAIN: ICD-10-CM

## 2025-02-06 RX ORDER — PREGABALIN 150 MG/1
CAPSULE ORAL
Qty: 90 CAPSULE | Refills: 5 | Status: SHIPPED | OUTPATIENT
Start: 2025-02-06 | End: 2025-05-09

## 2025-02-06 NOTE — TELEPHONE ENCOUNTER
Refill Request - Controlled Substance    CONFIRM preferred pharmacy with the patient.    If Mail Order Rx - Pend for 90 day refill.        Last Seen Department: 1/20/2025  Last Seen by PCP: 1/20/2025    Last Written: 11/10/24 #90 - 2 refills     Last UDS: 10/22/21    Med Agreement Signed On: 4/17/19    If no future appointment scheduled:  Review the last OV with PCP and review information for follow-up visit,  Route STAFF MESSAGE with patient name to the  Pool for scheduling with the following information:            -  Timing of next visit           -  Visit type ie Physical, OV, etc           -  Diagnoses/Reason ie. COPD, HTN - Do not use MEDICATION, Follow-up or CHECK UP - Give reason for visit        Next Appointment:   Future Appointments   Date Time Provider Department Center   2/7/2025  1:00 PM Luis Carlos Camp MD [facility]   2/21/2025  3:30 PM Jesus Chan MD [facility]   4/28/2025 10:30 AM Diana Benoit MD [facility]       Message sent to  to schedule appt with patient?  NO      Requested Prescriptions     Pending Prescriptions Disp Refills    pregabalin (LYRICA) 150 MG capsule 90 capsule 2     Sig: TAKE 1 CAPSULE BY MOUTH 3 TIMES A DAY

## 2025-02-12 DIAGNOSIS — E11.42 TYPE 2 DIABETES MELLITUS WITH DIABETIC POLYNEUROPATHY, WITHOUT LONG-TERM CURRENT USE OF INSULIN (HCC): ICD-10-CM

## 2025-02-12 RX ORDER — INSULIN GLARGINE 100 [IU]/ML
INJECTION, SOLUTION SUBCUTANEOUS
Qty: 6 ML | Refills: 5 | Status: SHIPPED | OUTPATIENT
Start: 2025-02-12 | End: 2025-02-18

## 2025-02-17 ENCOUNTER — PATIENT MESSAGE (OUTPATIENT)
Dept: FAMILY MEDICINE CLINIC | Age: 65
End: 2025-02-17

## 2025-03-04 RX ORDER — BLOOD SUGAR DIAGNOSTIC
1 STRIP MISCELLANEOUS DAILY
Qty: 100 EACH | Refills: 3 | Status: SHIPPED | OUTPATIENT
Start: 2025-03-04

## 2025-03-04 NOTE — TELEPHONE ENCOUNTER
.Refill Request     CONFIRM preferred pharmacy with the patient.    If Mail Order Rx - Pend for 90 day refill.      Last Seen: Last Seen Department: 2025  Last Seen by PCP: Visit date not found    Last Written: 24 100 with 3     If no future appointment scheduled:  Review the last OV with PCP and review information for follow-up visit,  Route STAFF MESSAGE with patient name to the  Pool for scheduling with the following information:            -  Timing of next visit           -  Visit type ie Physical, OV, etc           -  Diagnoses/Reason ie. COPD, HTN - Do not use MEDICATION, Follow-up or CHECK UP - Give reason for visit      Next Appointment:   Future Appointments   Date Time Provider Department Center   3/21/2025  2:30 PM Jesus Chan MD [facility]   2025 10:30 AM Diana Benoit MD [facility]       Message sent to  to schedule appt with patient?  NO      Requested Prescriptions     Pending Prescriptions Disp Refills    Insulin Pen Needle (PEN NEEDLES) 31G X 6 MM MISC 100 each 3     Si each by Does not apply route daily

## 2025-03-05 DIAGNOSIS — M48.061 FORAMINAL STENOSIS OF LUMBAR REGION: ICD-10-CM

## 2025-03-05 RX ORDER — OXYCODONE HYDROCHLORIDE 10 MG/1
10 TABLET ORAL EVERY 8 HOURS PRN
Qty: 90 TABLET | Refills: 0 | Status: SHIPPED | OUTPATIENT
Start: 2025-03-05 | End: 2025-04-04

## 2025-04-28 ENCOUNTER — OFFICE VISIT (OUTPATIENT)
Dept: FAMILY MEDICINE CLINIC | Age: 65
End: 2025-04-28
Payer: MEDICARE

## 2025-04-28 VITALS
SYSTOLIC BLOOD PRESSURE: 132 MMHG | DIASTOLIC BLOOD PRESSURE: 72 MMHG | BODY MASS INDEX: 55.32 KG/M2 | HEIGHT: 61 IN | WEIGHT: 293 LBS | HEART RATE: 75 BPM | OXYGEN SATURATION: 95 %

## 2025-04-28 DIAGNOSIS — E11.42 TYPE 2 DIABETES MELLITUS WITH DIABETIC POLYNEUROPATHY, WITHOUT LONG-TERM CURRENT USE OF INSULIN (HCC): ICD-10-CM

## 2025-04-28 DIAGNOSIS — Z13.820 SCREENING FOR OSTEOPOROSIS: ICD-10-CM

## 2025-04-28 DIAGNOSIS — Z12.31 SCREENING MAMMOGRAM FOR BREAST CANCER: ICD-10-CM

## 2025-04-28 DIAGNOSIS — Z72.0 TOBACCO USE: ICD-10-CM

## 2025-04-28 DIAGNOSIS — Z13.820 ENCOUNTER FOR OSTEOPOROSIS SCREENING IN ASYMPTOMATIC POSTMENOPAUSAL PATIENT: ICD-10-CM

## 2025-04-28 DIAGNOSIS — Z00.00 WELCOME TO MEDICARE PREVENTIVE VISIT: Primary | ICD-10-CM

## 2025-04-28 DIAGNOSIS — F43.20 GRIEF REACTION: ICD-10-CM

## 2025-04-28 DIAGNOSIS — E66.813 OBESITY, CLASS 3 (HCC): ICD-10-CM

## 2025-04-28 DIAGNOSIS — Z78.0 ENCOUNTER FOR OSTEOPOROSIS SCREENING IN ASYMPTOMATIC POSTMENOPAUSAL PATIENT: ICD-10-CM

## 2025-04-28 LAB
ALBUMIN SERPL-MCNC: 3.7 G/DL (ref 3.4–5)
ALBUMIN/GLOB SERPL: 1.1 {RATIO} (ref 1.1–2.2)
ALP SERPL-CCNC: 141 U/L (ref 40–129)
ALT SERPL-CCNC: 20 U/L (ref 10–40)
ANION GAP SERPL CALCULATED.3IONS-SCNC: 8 MMOL/L (ref 3–16)
AST SERPL-CCNC: 18 U/L (ref 15–37)
BASOPHILS # BLD: 0.1 K/UL (ref 0–0.2)
BASOPHILS NFR BLD: 0.8 %
BILIRUB SERPL-MCNC: 0.5 MG/DL (ref 0–1)
BUN SERPL-MCNC: 19 MG/DL (ref 7–20)
CALCIUM SERPL-MCNC: 9.3 MG/DL (ref 8.3–10.6)
CHLORIDE SERPL-SCNC: 102 MMOL/L (ref 99–110)
CHOLEST SERPL-MCNC: 143 MG/DL (ref 0–199)
CO2 SERPL-SCNC: 27 MMOL/L (ref 21–32)
CREAT SERPL-MCNC: 1.2 MG/DL (ref 0.6–1.2)
DEPRECATED RDW RBC AUTO: 16.3 % (ref 12.4–15.4)
EOSINOPHIL # BLD: 0.3 K/UL (ref 0–0.6)
EOSINOPHIL NFR BLD: 4.2 %
GFR SERPLBLD CREATININE-BSD FMLA CKD-EPI: 50 ML/MIN/{1.73_M2}
GLUCOSE SERPL-MCNC: 267 MG/DL (ref 70–99)
HBA1C MFR BLD: 9 %
HCT VFR BLD AUTO: 48.5 % (ref 36–48)
HDLC SERPL-MCNC: 38 MG/DL (ref 40–60)
HGB BLD-MCNC: 16.8 G/DL (ref 12–16)
LDLC SERPL CALC-MCNC: 86 MG/DL
LYMPHOCYTES # BLD: 1.7 K/UL (ref 1–5.1)
LYMPHOCYTES NFR BLD: 24.2 %
MCH RBC QN AUTO: 30.9 PG (ref 26–34)
MCHC RBC AUTO-ENTMCNC: 34.6 G/DL (ref 31–36)
MCV RBC AUTO: 89.2 FL (ref 80–100)
MONOCYTES # BLD: 0.4 K/UL (ref 0–1.3)
MONOCYTES NFR BLD: 5.9 %
NEUTROPHILS # BLD: 4.6 K/UL (ref 1.7–7.7)
NEUTROPHILS NFR BLD: 64.9 %
PLATELET # BLD AUTO: 189 K/UL (ref 135–450)
PMV BLD AUTO: 8.9 FL (ref 5–10.5)
POTASSIUM SERPL-SCNC: 5 MMOL/L (ref 3.5–5.1)
PROT SERPL-MCNC: 7 G/DL (ref 6.4–8.2)
RBC # BLD AUTO: 5.43 M/UL (ref 4–5.2)
SODIUM SERPL-SCNC: 137 MMOL/L (ref 136–145)
TRIGL SERPL-MCNC: 93 MG/DL (ref 0–150)
TSH SERPL DL<=0.005 MIU/L-ACNC: 2.34 UIU/ML (ref 0.27–4.2)
VLDLC SERPL CALC-MCNC: 19 MG/DL
WBC # BLD AUTO: 7.1 K/UL (ref 4–11)

## 2025-04-28 PROCEDURE — 83036 HEMOGLOBIN GLYCOSYLATED A1C: CPT | Performed by: STUDENT IN AN ORGANIZED HEALTH CARE EDUCATION/TRAINING PROGRAM

## 2025-04-28 PROCEDURE — G8400 PT W/DXA NO RESULTS DOC: HCPCS | Performed by: STUDENT IN AN ORGANIZED HEALTH CARE EDUCATION/TRAINING PROGRAM

## 2025-04-28 PROCEDURE — 99214 OFFICE O/P EST MOD 30 MIN: CPT | Performed by: STUDENT IN AN ORGANIZED HEALTH CARE EDUCATION/TRAINING PROGRAM

## 2025-04-28 PROCEDURE — G8417 CALC BMI ABV UP PARAM F/U: HCPCS | Performed by: STUDENT IN AN ORGANIZED HEALTH CARE EDUCATION/TRAINING PROGRAM

## 2025-04-28 PROCEDURE — 3075F SYST BP GE 130 - 139MM HG: CPT | Performed by: STUDENT IN AN ORGANIZED HEALTH CARE EDUCATION/TRAINING PROGRAM

## 2025-04-28 PROCEDURE — 3052F HG A1C>EQUAL 8.0%<EQUAL 9.0%: CPT | Performed by: STUDENT IN AN ORGANIZED HEALTH CARE EDUCATION/TRAINING PROGRAM

## 2025-04-28 PROCEDURE — 1090F PRES/ABSN URINE INCON ASSESS: CPT | Performed by: STUDENT IN AN ORGANIZED HEALTH CARE EDUCATION/TRAINING PROGRAM

## 2025-04-28 PROCEDURE — G0402 INITIAL PREVENTIVE EXAM: HCPCS | Performed by: STUDENT IN AN ORGANIZED HEALTH CARE EDUCATION/TRAINING PROGRAM

## 2025-04-28 PROCEDURE — 2022F DILAT RTA XM EVC RTNOPTHY: CPT | Performed by: STUDENT IN AN ORGANIZED HEALTH CARE EDUCATION/TRAINING PROGRAM

## 2025-04-28 PROCEDURE — 4004F PT TOBACCO SCREEN RCVD TLK: CPT | Performed by: STUDENT IN AN ORGANIZED HEALTH CARE EDUCATION/TRAINING PROGRAM

## 2025-04-28 PROCEDURE — G8427 DOCREV CUR MEDS BY ELIG CLIN: HCPCS | Performed by: STUDENT IN AN ORGANIZED HEALTH CARE EDUCATION/TRAINING PROGRAM

## 2025-04-28 PROCEDURE — 3078F DIAST BP <80 MM HG: CPT | Performed by: STUDENT IN AN ORGANIZED HEALTH CARE EDUCATION/TRAINING PROGRAM

## 2025-04-28 PROCEDURE — 1123F ACP DISCUSS/DSCN MKR DOCD: CPT | Performed by: STUDENT IN AN ORGANIZED HEALTH CARE EDUCATION/TRAINING PROGRAM

## 2025-04-28 PROCEDURE — 3017F COLORECTAL CA SCREEN DOC REV: CPT | Performed by: STUDENT IN AN ORGANIZED HEALTH CARE EDUCATION/TRAINING PROGRAM

## 2025-04-28 ASSESSMENT — PATIENT HEALTH QUESTIONNAIRE - PHQ9
5. POOR APPETITE OR OVEREATING: SEVERAL DAYS
2. FEELING DOWN, DEPRESSED OR HOPELESS: NEARLY EVERY DAY
SUM OF ALL RESPONSES TO PHQ QUESTIONS 1-9: 13
9. THOUGHTS THAT YOU WOULD BE BETTER OFF DEAD, OR OF HURTING YOURSELF: NOT AT ALL
1. LITTLE INTEREST OR PLEASURE IN DOING THINGS: NEARLY EVERY DAY
3. TROUBLE FALLING OR STAYING ASLEEP: NEARLY EVERY DAY
DEPRESSION UNABLE TO ASSESS: FUNCTIONAL CAPACITY MOTIVATION LIMITS ACCURACY
SUM OF ALL RESPONSES TO PHQ QUESTIONS 1-9: 13
4. FEELING TIRED OR HAVING LITTLE ENERGY: MORE THAN HALF THE DAYS
6. FEELING BAD ABOUT YOURSELF - OR THAT YOU ARE A FAILURE OR HAVE LET YOURSELF OR YOUR FAMILY DOWN: NOT AT ALL
7. TROUBLE CONCENTRATING ON THINGS, SUCH AS READING THE NEWSPAPER OR WATCHING TELEVISION: SEVERAL DAYS
SUM OF ALL RESPONSES TO PHQ QUESTIONS 1-9: 13
10. IF YOU CHECKED OFF ANY PROBLEMS, HOW DIFFICULT HAVE THESE PROBLEMS MADE IT FOR YOU TO DO YOUR WORK, TAKE CARE OF THINGS AT HOME, OR GET ALONG WITH OTHER PEOPLE: VERY DIFFICULT
8. MOVING OR SPEAKING SO SLOWLY THAT OTHER PEOPLE COULD HAVE NOTICED. OR THE OPPOSITE, BEING SO FIGETY OR RESTLESS THAT YOU HAVE BEEN MOVING AROUND A LOT MORE THAN USUAL: NOT AT ALL
SUM OF ALL RESPONSES TO PHQ QUESTIONS 1-9: 13

## 2025-04-28 ASSESSMENT — LIFESTYLE VARIABLES
HOW OFTEN DO YOU HAVE A DRINK CONTAINING ALCOHOL: NEVER
HOW MANY STANDARD DRINKS CONTAINING ALCOHOL DO YOU HAVE ON A TYPICAL DAY: PATIENT DOES NOT DRINK

## 2025-04-28 NOTE — PROGRESS NOTES
Medicare Annual Wellness Visit    Saba Yin is here for new to provider, Diabetes (3 month f/u DM), and Medicare AWV    Assessment & Plan   Welcome to Medicare preventive visit  Grief reaction  Type 2 diabetes mellitus with diabetic polyneuropathy, without long-term current use of insulin (HCC)  -     POCT glycosylated hemoglobin (Hb A1C)  -     CBC with Auto Differential; Future  -     Comprehensive Metabolic Panel; Future  -     Lipid Panel; Future  -     TSH reflex to FT4; Future  Tobacco use  -     CT LUNG CANCER SCREENING (INITIAL/ANNUAL); Future  Obesity, class 3 (E66.813)  Body mass index [BMI] 50.0-59.9, adult (Z68.43)  Screening mammogram for breast cancer  -     NAZ DIGITAL SCREEN W OR WO CAD BILATERAL; Future  Screening for osteoporosis  -     DEXA BONE DENSITY AXIAL SKELETON; Future  Encounter for osteoporosis screening in asymptomatic postmenopausal patient  -     DEXA BONE DENSITY AXIAL SKELETON; Future    --Grief reaction - has access to counseling, declined medications at this time   --A1C improved to 9% - declines interest in changing medication regimen ie adding GLP-1 (though will consider rybelsus pending cost); will message with AM glucoses fasting over the next week. Refill for CGM - recommend bringing  to next appt.   --Pre-contemplative for smoking cessation      Return in about 3 months (around 2025) for F/u DM.     Subjective   The following acute and/or chronic problems were also addressed today:  -- passed away last month from brain cancer with immunotherapy;  for 48 years - in grief stages. Daughter moving in with her, son in [place] - coming to visit. No  as  cremated. In the \"now what\" stage. Has grief counseling with hospice services available.   --DM - A1C 10.6% -> 9.0% today, on glipizide 20mg daily, glargine 20U nightly. On statin, no ACEi/Arb. Libre3 device for 3 months. [place] a lot with diet. Low sugar with not eating - 64 when

## 2025-04-30 ENCOUNTER — RESULTS FOLLOW-UP (OUTPATIENT)
Dept: FAMILY MEDICINE CLINIC | Age: 65
End: 2025-04-30

## 2025-05-05 DIAGNOSIS — E78.49 OTHER HYPERLIPIDEMIA: ICD-10-CM

## 2025-05-05 RX ORDER — PRAVASTATIN SODIUM 40 MG
TABLET ORAL
Qty: 90 TABLET | Refills: 1 | Status: SHIPPED | OUTPATIENT
Start: 2025-05-05

## 2025-06-12 DIAGNOSIS — G89.29 OTHER CHRONIC PAIN: ICD-10-CM

## 2025-06-12 RX ORDER — PREGABALIN 150 MG/1
CAPSULE ORAL
Qty: 90 CAPSULE | Refills: 5 | Status: CANCELLED | OUTPATIENT
Start: 2025-06-12 | End: 2025-09-12

## 2025-06-12 RX ORDER — PREGABALIN 100 MG/1
CAPSULE ORAL
Qty: 90 CAPSULE | Refills: 1 | Status: SHIPPED | OUTPATIENT
Start: 2025-06-12 | End: 2025-09-12

## 2025-06-12 NOTE — TELEPHONE ENCOUNTER
Refill Request     CONFIRM preferred pharmacy with the patient.    If Mail Order Rx - Pend for 90 day refill.      Last Seen: Last Seen Department: 4/28/2025  Last Seen by PCP: 4/28/2025    Last Written: 02/06/25     If no future appointment scheduled:  Review the last OV with PCP and review information for follow-up visit,  Route STAFF MESSAGE with patient name to the  Pool for scheduling with the following information:            -  Timing of next visit           -  Visit type ie Physical, OV, etc           -  Diagnoses/Reason ie. COPD, HTN - Do not use MEDICATION, Follow-up or CHECK UP - Give reason for visit      Next Appointment:   Future Appointments   Date Time Provider Department Center   8/11/2025  4:30 PM Diana Benoit MD [facility] DE[facility]       Message sent to  to schedule appt with patient?  NO      Requested Prescriptions     Pending Prescriptions Disp Refills    pregabalin (LYRICA) 150 MG capsule 90 capsule 5     Sig: TAKE 1 CAPSULE BY MOUTH 3 TIMES A DAY